# Patient Record
Sex: FEMALE | Race: WHITE | NOT HISPANIC OR LATINO | Employment: UNEMPLOYED | URBAN - METROPOLITAN AREA
[De-identification: names, ages, dates, MRNs, and addresses within clinical notes are randomized per-mention and may not be internally consistent; named-entity substitution may affect disease eponyms.]

---

## 2017-04-17 ENCOUNTER — OFFICE VISIT (OUTPATIENT)
Dept: ENDOCRINOLOGY | Age: 62
End: 2017-04-17

## 2017-04-17 VITALS
HEART RATE: 104 BPM | WEIGHT: 140.8 LBS | BODY MASS INDEX: 20.86 KG/M2 | DIASTOLIC BLOOD PRESSURE: 76 MMHG | OXYGEN SATURATION: 93 % | SYSTOLIC BLOOD PRESSURE: 140 MMHG | HEIGHT: 69 IN

## 2017-04-17 DIAGNOSIS — Z46.81 INSULIN PUMP TITRATION: ICD-10-CM

## 2017-04-17 DIAGNOSIS — IMO0002 UNCONTROLLED TYPE 1 DIABETES WITH DIABETIC NEUROPATHY: ICD-10-CM

## 2017-04-17 DIAGNOSIS — E16.1 HYPERINSULINISM: ICD-10-CM

## 2017-04-17 DIAGNOSIS — M81.0 OSTEOPOROSIS, POSTMENOPAUSAL: ICD-10-CM

## 2017-04-17 DIAGNOSIS — IMO0002 DIABETES MELLITUS TYPE 1, UNCONTROLLED, WITH COMPLICATIONS: Primary | ICD-10-CM

## 2017-04-17 DIAGNOSIS — IMO0002 UNCONTROLLED TYPE 1 DIABETES MELLITUS WITH NEPHROPATHY: ICD-10-CM

## 2017-04-17 PROCEDURE — 99214 OFFICE O/P EST MOD 30 MIN: CPT | Performed by: INTERNAL MEDICINE

## 2017-04-17 NOTE — PROGRESS NOTES
62 y.o.    Patient Care Team:  Aleja Lozano MD as PCP - General  Aleja Lozano MD as PCP - Family Medicine    Chief Complaint:        F/U TYPE 1 DIABETES, UNCONTROLLED.    Subjective     HPI patient is a 62-year-old white female with past history of uncontrolled type I diabetes mellitus came for follow-up  Patient currently uses Medtronic insulin pump  He uses NovoLog via the pump  Patient reported that her blood sugars are fluctuating up from  for the most part  Uncontrolled type 1 diabetes mellitus with neuropathy  Patient is significantly symptomatic and used to be on gabapentin in the past but currently she is on Effexor  Uncontrolled type 1 diabetes mellitus with nephropathy  Patient is significant microalbuminuria.  Hypoglycemia  Patient has several episodes of hypoglycemia mostly during the day        Interval History July 22, 2016    SUMMARY  Patient has had significantly uncontrolled diabetes mellitus over the past many years even with the pump. She frequently has blood sugars in the 25-30 range and at times and jumping to 300-400. Patient reports that she has no clue about this fluctuating glucoses and does not believe that her diet is responsible. As  She is very low-carb diet, has not been able to exercise due to recurrent fractures. She recently had a right hip fracture on 05/17/2013 and subsequently one week of the surgery she fell and broke hip and pelvis, was in [facility] rehabilitation. She reports that she had significant hypoglycemia at that location possibly secondary to increasing physical activity due to therapy.  She also has significant diabetic neuropathy. unchanged in the past few years. She currently takes gabapentin for this symptom. She started noticing muscle wasting in both hands and apparently a neurology appointment was made for March 2015     She denied any knowledge of diabetic retinopathy.   She has significant hypoglycemia as mentioned above mostly  during the day.  History of alcohol abuse in the recent past which patient denies currently.  Most of the time she leaves the pump on basal insulin only and has not been taking boluses via pump  patient reported that she fell and tore some ligaments in the elbow  She also had bilateral cataract surgeries within the past 6 months     She was also apparently admitted to psych unit and stayed there for 5 days and has been going to AA meetings and has been sober for several months now.  She also reported that she has been abstinent of alcohol for the past 3 months  She had a few pump issues with her she had to change pump infusion sets frequently.  She reported that she has checked a few times in the middle of the night but most of the Accu-Cheks were done during the day.  She has had a few episodes of hypoglycemia but none to severe recently.  She also started disconnecting her pump overnight to avoid any nocturnal hypoglycemia. As a result her morning blood sugars are elevated in the 250-400 range  Patient reported that her insulin pump settings were adjusted by Radha Ramirez in the past and the blood sugars are slightly better but she still continues to drop her blood sugars  She appears to be only using basal insulin at this time and is not using any bolus corrections so most likely the hypoglycemia is secondary to basal insulin rates  She has been feeling much better in general for the past few months.      Interval History      Patient reported that she had Reclast for 2 years approximately 2 years ago. She was unable to get any further treatments due to insurance issues.  Patient reported her blood sugars are fluctuating both up and down but generally better than before.  She still has symptomatic hyperglycemia and  reported that he had to give her some glucagon injections recently  Patient reported that she's currently due for a DEXA scan for osteoporosis    The following portions of the patient's history  were reviewed and updated as appropriate: allergies, current medications, past family history, past medical history, past social history, past surgical history and problem list.    Past Medical History:   Diagnosis Date   • Anxiety    • Arthritis    • Diabetes mellitus type I      Family History   Problem Relation Age of Onset   • Cancer Father    • Diabetes Father      Social History     Social History   • Marital status:      Spouse name: N/A   • Number of children: N/A   • Years of education: N/A     Occupational History   • Not on file.     Social History Main Topics   • Smoking status: Never Smoker   • Smokeless tobacco: Not on file   • Alcohol use No   • Drug use: No   • Sexual activity: Not on file     Other Topics Concern   • Not on file     Social History Narrative     Allergies   Allergen Reactions   • Contrast Dye Other (See Comments)     Terrible back pains   • Penicillins        Current Outpatient Prescriptions:   •  cyanocobalamin 100 MCG tablet, Take  by mouth., Disp: , Rfl:   •  glucose blood test strip, Janee Contour Next Test In Vitro Strip; Patient Sig: Janee Contour Next Test In Vitro Strip USE TO TEST FIVE TIMES DAILY OR AS DIRECTED; 450; 3; 12-Nov-2015; Active, Disp: , Rfl:   •  insulin aspart (NOVOLOG) 100 UNIT/ML injection, Inject daily under skin via insulin pump, Disp: 90 mL, Rfl: 0  •  traZODone (DESYREL) 50 MG tablet, Take 50 mg by mouth every night., Disp: , Rfl:   •  venlafaxine (EFFEXOR) 12.5 MG half tablet, Take  by mouth daily., Disp: , Rfl:         Review of Systems   Constitutional: Negative for chills, fatigue and fever.   Cardiovascular: Negative for chest pain and palpitations.   Gastrointestinal: Negative for abdominal pain, constipation, diarrhea, nausea and vomiting.   Endocrine: Negative for cold intolerance and heat intolerance.   All other systems reviewed and are negative.      Objective       Vitals:    04/17/17 1017   BP: 140/76   Pulse: 104   SpO2: 93%   Weight:  "140 lb 12.8 oz (63.9 kg)   Height: 69\" (175.3 cm)     Body mass index is 20.79 kg/(m^2).      Physical Exam   Constitutional: She is oriented to person, place, and time. She appears well-developed and well-nourished.   HENT:   Head: Normocephalic and atraumatic.   Eyes: EOM are normal. Pupils are equal, round, and reactive to light.   Neck: Normal range of motion. Neck supple. No thyromegaly present.   Cardiovascular: Normal rate, regular rhythm, normal heart sounds and intact distal pulses.    Pulmonary/Chest: Effort normal and breath sounds normal.   Abdominal: Soft. Bowel sounds are normal. She exhibits no distension.   Musculoskeletal: Normal range of motion. She exhibits no edema.   Neurological: She is alert and oriented to person, place, and time. She has normal reflexes.   Skin: Skin is warm and dry.   Psychiatric: She has a normal mood and affect. Her behavior is normal.   Nursing note and vitals reviewed.    Results Review:     I reviewed the patient's new clinical results.    Medical records reviewed  Summary:      Office Visit on 07/22/2016   Component Date Value Ref Range Status   • Hemoglobin A1C 07/22/2016 7.40* 4.80 - 5.60 % Final    Comment: Hemoglobin A1C Ranges:  Increased Risk for Diabetes  5.7% to 6.4%  Diabetes                     >= 6.5%  Diabetic Goal                < 7.0%     • Glucose 07/22/2016 115* 65 - 99 mg/dL Final   • BUN 07/22/2016 13  6 - 20 mg/dL Final   • Creatinine 07/22/2016 0.68  0.57 - 1.00 mg/dL Final   • eGFR Non  Am 07/22/2016 89  >60 mL/min/1.73 Final   • eGFR African Am 07/22/2016 107  >60 mL/min/1.73 Final   • BUN/Creatinine Ratio 07/22/2016 19.1  7.0 - 25.0 Final   • Sodium 07/22/2016 139  136 - 145 mmol/L Final   • Potassium 07/22/2016 4.7  3.5 - 5.2 mmol/L Final   • Chloride 07/22/2016 101  98 - 107 mmol/L Final   • Total CO2 07/22/2016 28.0  22.0 - 29.0 mmol/L Final   • Calcium 07/22/2016 8.8  8.6 - 10.5 mg/dL Final   • Total Protein 07/22/2016 6.8  6.0 - 8.5 " g/dL Final   • Albumin 07/22/2016 4.30  3.50 - 5.20 g/dL Final   • Globulin 07/22/2016 2.5  gm/dL Final   • A/G Ratio 07/22/2016 1.7  g/dL Final   • Total Bilirubin 07/22/2016 0.3  0.1 - 1.2 mg/dL Final   • Alkaline Phosphatase 07/22/2016 89  39 - 117 U/L Final   • AST (SGOT) 07/22/2016 26  1 - 32 U/L Final   • ALT (SGPT) 07/22/2016 24  1 - 33 U/L Final   • Total Cholesterol 07/22/2016 159  0 - 200 mg/dL Final   • Triglycerides 07/22/2016 55  0 - 150 mg/dL Final   • HDL Cholesterol 07/22/2016 79* 40 - 60 mg/dL Final   • VLDL Cholesterol 07/22/2016 11  5 - 40 mg/dL Final   • LDL Cholesterol  07/22/2016 69  0 - 100 mg/dL Final   • Creatinine, Urine 07/22/2016 127.8  Not Estab. mg/dL Final   • Microalbumin, Urine 07/22/2016 9.3  Not Estab. ug/mL Final   • Microalbumin/Creatinine Ratio Urine 07/22/2016 7.3  0.0 - 30.0 mg/g creat Final     Lab Results   Component Value Date    HGBA1C 7.40 (H) 07/22/2016    HGBA1C 7.3 (H) 03/22/2016    HGBA1C 7.5 (H) 11/06/2015     Lab Results   Component Value Date    MICROALBUR 9.3 07/22/2016    CREATININE 0.68 07/22/2016     Imaging Results (most recent)     None                Assessment and Plan:    Opal was seen today for diabetes.    Diagnoses and all orders for this visit:    Diabetes mellitus type 1, uncontrolled, with complications  -     Comprehensive Metabolic Panel; Future  -     Hemoglobin A1c; Future  -     Microalbumin / Creatinine Urine Ratio; Future  -     Lipid Panel; Future  -     Comprehensive Metabolic Panel  -     Hemoglobin A1c  -     Microalbumin / Creatinine Urine Ratio  -     Lipid Panel    Uncontrolled type 1 diabetes mellitus with nephropathy  -     Comprehensive Metabolic Panel; Future  -     Hemoglobin A1c; Future  -     Microalbumin / Creatinine Urine Ratio; Future  -     Lipid Panel; Future  -     Comprehensive Metabolic Panel  -     Hemoglobin A1c  -     Microalbumin / Creatinine Urine Ratio  -     Lipid Panel    Hyperinsulinism  -     Comprehensive  Metabolic Panel; Future  -     Hemoglobin A1c; Future  -     Microalbumin / Creatinine Urine Ratio; Future  -     Lipid Panel; Future  -     Comprehensive Metabolic Panel  -     Hemoglobin A1c  -     Microalbumin / Creatinine Urine Ratio  -     Lipid Panel    Uncontrolled type 1 diabetes with diabetic neuropathy  -     Comprehensive Metabolic Panel; Future  -     Hemoglobin A1c; Future  -     Microalbumin / Creatinine Urine Ratio; Future  -     Lipid Panel; Future  -     Comprehensive Metabolic Panel  -     Hemoglobin A1c  -     Microalbumin / Creatinine Urine Ratio  -     Lipid Panel    Insulin pump titration  -     Comprehensive Metabolic Panel; Future  -     Hemoglobin A1c; Future  -     Microalbumin / Creatinine Urine Ratio; Future  -     Lipid Panel; Future  -     Comprehensive Metabolic Panel  -     Hemoglobin A1c  -     Microalbumin / Creatinine Urine Ratio  -     Lipid Panel    Osteoporosis, postmenopausal  -     Comprehensive Metabolic Panel; Future  -     Hemoglobin A1c; Future  -     Microalbumin / Creatinine Urine Ratio; Future  -     Lipid Panel; Future  -     Comprehensive Metabolic Panel  -     Hemoglobin A1c  -     Microalbumin / Creatinine Urine Ratio  -     Lipid Panel    Other orders  -     insulin aspart (NOVOLOG) 100 UNIT/ML injection; Inject daily under skin via insulin pump      #1 uncontrolled type I diabetes mellitus: Patient has a rather brittle diabetes with blood sugars fluctuating from . Patient continues to be on insulin pump.   #2 diabetic neuropathy patient is extremely symptomatic with symptoms in all 4 extremities.  #3 recurrent hypoglycemia and hypoglycemia unawareness: Patient has significant hypoglycemia over the past several years and also has hypoglycemia unawareness.  #4 diabetic nephropathy as shown by elevated microalbumin levels  #5 insulin pump management  Pump download was reviewed  I advised the patient to use bolus correction if the blood sugars are greater than  150 prior to meal  I did adjust the basal rates today to improve the hypoglycemic risk-mostly overnight  I advised the patient to see the diabetes educator in the office   #6 Osteoporosis    Patient reported that she had Reclast injection approximately 3 years ago.  She could not get any more due to insurance issues  Patient has bilateral hip fractures within the past 2-3 years.  I recommended that patient should obtain new upgrade on the Medtronic pump 670 G  [employer] pump representative contacted    Patient will get lab testing done today  She'll return to follow-up in 3 months.    The total time spent for old record and lab review and face- to- face was more than 25 min of which greater than 15 min of time was spent on counseling the patient on recommended evaluation and treatment options, instructions for management/treatment and /or follow up  and importance of compliance with chosen management or treatment options    Grayson Levi MD. FACE    04/17/17      EMR Dragon / transcription disclaimer:    Much of this encounter note is an electronic transcription/ translation of spoken language to printed text.  Electronic translation of spoken language may permit erroneous, or at times, nonsensical words or phrases to be inadvertently transcribed; although I have reviewed the note for such errors, some may still exist.

## 2017-05-22 RX ORDER — IBUPROFEN 600 MG/1
TABLET ORAL
Qty: 1 KIT | Refills: 0 | Status: SHIPPED | OUTPATIENT
Start: 2017-05-22 | End: 2017-06-29 | Stop reason: SDUPTHER

## 2017-06-30 RX ORDER — IBUPROFEN 600 MG/1
TABLET ORAL
Qty: 1 KIT | Refills: 0 | Status: SHIPPED | OUTPATIENT
Start: 2017-06-30 | End: 2017-09-14 | Stop reason: SDUPTHER

## 2017-08-08 ENCOUNTER — OFFICE VISIT (OUTPATIENT)
Dept: ENDOCRINOLOGY | Age: 62
End: 2017-08-08

## 2017-08-08 VITALS
HEART RATE: 86 BPM | SYSTOLIC BLOOD PRESSURE: 168 MMHG | OXYGEN SATURATION: 98 % | DIASTOLIC BLOOD PRESSURE: 102 MMHG | BODY MASS INDEX: 21.18 KG/M2 | WEIGHT: 143 LBS | HEIGHT: 69 IN

## 2017-08-08 DIAGNOSIS — M81.0 OSTEOPOROSIS, POSTMENOPAUSAL: ICD-10-CM

## 2017-08-08 DIAGNOSIS — IMO0002 UNCONTROLLED TYPE 1 DIABETES MELLITUS WITH NEPHROPATHY: ICD-10-CM

## 2017-08-08 DIAGNOSIS — IMO0002 UNCONTROLLED TYPE 1 DIABETES WITH DIABETIC NEUROPATHY: ICD-10-CM

## 2017-08-08 DIAGNOSIS — IMO0002 DIABETES MELLITUS TYPE 1, UNCONTROLLED, WITH COMPLICATIONS: Primary | ICD-10-CM

## 2017-08-08 DIAGNOSIS — E16.1 HYPERINSULINISM: ICD-10-CM

## 2017-08-08 DIAGNOSIS — Z46.81 INSULIN PUMP TITRATION: ICD-10-CM

## 2017-08-08 LAB
ALBUMIN SERPL-MCNC: 4.4 G/DL (ref 3.5–5.2)
ALBUMIN/GLOB SERPL: 1.3 G/DL
ALP SERPL-CCNC: 109 U/L (ref 39–117)
ALT SERPL-CCNC: 18 U/L (ref 1–33)
AST SERPL-CCNC: 19 U/L (ref 1–32)
BILIRUB SERPL-MCNC: 0.4 MG/DL (ref 0.1–1.2)
BUN SERPL-MCNC: 6 MG/DL (ref 8–23)
BUN/CREAT SERPL: 8.8 (ref 7–25)
CALCIUM SERPL-MCNC: 9.3 MG/DL (ref 8.6–10.5)
CHLORIDE SERPL-SCNC: 99 MMOL/L (ref 98–107)
CO2 SERPL-SCNC: 31 MMOL/L (ref 22–29)
CREAT SERPL-MCNC: 0.68 MG/DL (ref 0.57–1)
GLOBULIN SER CALC-MCNC: 3.3 GM/DL
GLUCOSE SERPL-MCNC: 138 MG/DL (ref 65–99)
HBA1C MFR BLD: 6.79 % (ref 4.8–5.6)
POTASSIUM SERPL-SCNC: 4.5 MMOL/L (ref 3.5–5.2)
PROT SERPL-MCNC: 7.7 G/DL (ref 6–8.5)
SODIUM SERPL-SCNC: 140 MMOL/L (ref 136–145)

## 2017-08-08 PROCEDURE — 99214 OFFICE O/P EST MOD 30 MIN: CPT | Performed by: INTERNAL MEDICINE

## 2017-08-08 NOTE — PROGRESS NOTES
62 y.o.    Patient Care Team:  Aleja Lozano MD as PCP - General  Aleja Lozano MD as PCP - Family Medicine    Chief Complaint:    F/U TYPE 1 DIABETES UNCONTROLLED NO RECENT LABS  Subjective     HPI     Patient is a 62-year-old white female with a past medical history of uncontrolled type 1 diabetes mellitus with competitions came for followup.  Patient is currently using Medtronic insulin pump.  She uses NovoLog via the pump.  Patient reports his blood sugars are fluctuating from .  Uncontrolled type 1 diabetes mellitus with neuropathy.  Patient is significantly symptomatic and reports using gabapentin in the past but currently taking Effexor   Uncontrolled type I diabetic with nephropathy.  Patient is significant microalbuminuria.  Hypoglycemia.  Patient had several episodes of hypoglycemia. Throughout the day but  Osteoporosis.  Patient was on the RECLAST about 3 years ago until she fell and broke her hips and knees  The medication was discontinued. Due to questions regarding, healing.  Patient is, seeking further management of osteoporosis. At this point       and him  Interval History July 22, 2016     SUMMARY  Patient has had significantly uncontrolled diabetes mellitus over the past many years even with the pump. She frequently has blood sugars in the 25-30 range and at times and jumping to 300-400. Patient reports that she has no clue about this fluctuating glucoses and does not believe that her diet is responsible. As  She is very low-carb diet, has not been able to exercise due to recurrent fractures. She recently had a right hip fracture on 05/17/2013 and subsequently one week of the surgery she fell and broke hip and pelvis, was in [facility] rehabilitation. She reports that she had significant hypoglycemia at that location possibly secondary to increasing physical activity due to therapy.  She also has significant diabetic neuropathy. unchanged in the past few years. She  currently takes gabapentin for this symptom. She started noticing muscle wasting in both hands and apparently a neurology appointment was made for March 2015      She denied any knowledge of diabetic retinopathy.   She has significant hypoglycemia as mentioned above mostly during the day.  History of alcohol abuse in the recent past which patient denies currently.  Most of the time she leaves the pump on basal insulin only and has not been taking boluses via pump  patient reported that she fell and tore some ligaments in the elbow  She also had bilateral cataract surgeries within the past 6 months      She was also apparently admitted to psych unit and stayed there for 5 days and has been going to AA meetings and has been sober for several months now.  She also reported that she has been abstinent of alcohol for the past 3 months  She had a few pump issues with her she had to change pump infusion sets frequently.  She reported that she has checked a few times in the middle of the night but most of the Accu-Cheks were done during the day.  She has had a few episodes of hypoglycemia but none to severe recently.  She also started disconnecting her pump overnight to avoid any nocturnal hypoglycemia. As a result her morning blood sugars are elevated in the 250-400 range  Patient reported that her insulin pump settings were adjusted by Radha Ramirez in the past and the blood sugars are slightly better but she still continues to drop her blood sugars  She appears to be only using basal insulin at this time and is not using any bolus corrections so most likely the hypoglycemia is secondary to basal insulin rates  She has been feeling much better in general for the past few months.      Interval History      Patient reported that she had Reclast for 2 years approximately 2 years ago. She was unable to get any further treatments due to insurance issues.  Patient reported her blood sugars are fluctuating both up and down but  generally better than before.  She still has symptomatic hyperglycemia and  reported that he had to give her some glucagon injections recently  Patient reported that she's currently due for a DEXA scan for osteoporosis  The following portions of the patient's history were reviewed and updated as appropriate: allergies, current medications, past family history, past medical history, past social history, past surgical history and problem list.    Past Medical History:   Diagnosis Date   • Anxiety    • Arthritis    • Diabetes mellitus type I      Family History   Problem Relation Age of Onset   • Cancer Father    • Diabetes Father      Social History     Social History   • Marital status:      Spouse name: N/A   • Number of children: N/A   • Years of education: N/A     Occupational History   • Not on file.     Social History Main Topics   • Smoking status: Never Smoker   • Smokeless tobacco: Not on file   • Alcohol use No   • Drug use: No   • Sexual activity: Not on file     Other Topics Concern   • Not on file     Social History Narrative     Allergies   Allergen Reactions   • Contrast Dye Other (See Comments)     Terrible back pains   • Penicillins        Current Outpatient Prescriptions:   •  cyanocobalamin 100 MCG tablet, Take  by mouth., Disp: , Rfl:   •  GLUCAGON EMERGENCY 1 MG injection, USE AS DIRECTED, Disp: 1 kit, Rfl: 0  •  glucose blood test strip, Janee Contour Next Test In Vitro Strip; Patient Sig: Janee Contour Next Test In Vitro Strip USE TO TEST FIVE TIMES DAILY OR AS DIRECTED; 450; 3; 12-Nov-2015; Active, Disp: , Rfl:   •  insulin aspart (NOVOLOG) 100 UNIT/ML injection, Inject daily under skin via insulin pump, Disp: 90 mL, Rfl: 1  •  traZODone (DESYREL) 50 MG tablet, Take 50 mg by mouth every night., Disp: , Rfl:   •  venlafaxine (EFFEXOR) 12.5 MG half tablet, Take  by mouth daily., Disp: , Rfl:         Review of Systems   Constitutional: Negative for fatigue.   HENT: Negative for  "facial swelling.    Respiratory: Negative for shortness of breath and wheezing.    Cardiovascular: Negative for chest pain and leg swelling.   Gastrointestinal: Negative for diarrhea and vomiting.   Endocrine: Negative for polydipsia.   Genitourinary: Negative for frequency.   Musculoskeletal: Negative for arthralgias.   Skin: Negative for rash.   Neurological: Negative for dizziness and numbness.   All other systems reviewed and are negative.      Objective       Vitals:    08/08/17 0943   BP: (!) 168/102   Pulse: 86   SpO2: 98%   Weight: 143 lb (64.9 kg)   Height: 69\" (175.3 cm)     Body mass index is 21.12 kg/(m^2).      Physical Exam   Constitutional: She is oriented to person, place, and time. She appears well-developed and well-nourished.   HENT:   Head: Normocephalic and atraumatic.   Eyes: EOM are normal. Pupils are equal, round, and reactive to light.   Neck: Normal range of motion. Neck supple. No thyromegaly present.   Cardiovascular: Normal rate, regular rhythm, normal heart sounds and intact distal pulses.    Pulmonary/Chest: Effort normal and breath sounds normal.   Abdominal: Soft. Bowel sounds are normal. She exhibits no distension.   Musculoskeletal: Normal range of motion. She exhibits no edema.   Neurological: She is alert and oriented to person, place, and time. She has normal reflexes.   Skin: Skin is warm and dry.   Psychiatric: She has a normal mood and affect. Her behavior is normal.   Nursing note and vitals reviewed.    Results Review:     I reviewed the patient's new clinical results.    Medical records reviewed  Summary:      Office Visit on 07/22/2016   Component Date Value Ref Range Status   • Hemoglobin A1C 07/22/2016 7.40* 4.80 - 5.60 % Final    Comment: Hemoglobin A1C Ranges:  Increased Risk for Diabetes  5.7% to 6.4%  Diabetes                     >= 6.5%  Diabetic Goal                < 7.0%     • Glucose 07/22/2016 115* 65 - 99 mg/dL Final   • BUN 07/22/2016 13  6 - 20 mg/dL Final "   • Creatinine 07/22/2016 0.68  0.57 - 1.00 mg/dL Final   • eGFR Non  Am 07/22/2016 89  >60 mL/min/1.73 Final   • eGFR African Am 07/22/2016 107  >60 mL/min/1.73 Final   • BUN/Creatinine Ratio 07/22/2016 19.1  7.0 - 25.0 Final   • Sodium 07/22/2016 139  136 - 145 mmol/L Final   • Potassium 07/22/2016 4.7  3.5 - 5.2 mmol/L Final   • Chloride 07/22/2016 101  98 - 107 mmol/L Final   • Total CO2 07/22/2016 28.0  22.0 - 29.0 mmol/L Final   • Calcium 07/22/2016 8.8  8.6 - 10.5 mg/dL Final   • Total Protein 07/22/2016 6.8  6.0 - 8.5 g/dL Final   • Albumin 07/22/2016 4.30  3.50 - 5.20 g/dL Final   • Globulin 07/22/2016 2.5  gm/dL Final   • A/G Ratio 07/22/2016 1.7  g/dL Final   • Total Bilirubin 07/22/2016 0.3  0.1 - 1.2 mg/dL Final   • Alkaline Phosphatase 07/22/2016 89  39 - 117 U/L Final   • AST (SGOT) 07/22/2016 26  1 - 32 U/L Final   • ALT (SGPT) 07/22/2016 24  1 - 33 U/L Final   • Total Cholesterol 07/22/2016 159  0 - 200 mg/dL Final   • Triglycerides 07/22/2016 55  0 - 150 mg/dL Final   • HDL Cholesterol 07/22/2016 79* 40 - 60 mg/dL Final   • VLDL Cholesterol 07/22/2016 11  5 - 40 mg/dL Final   • LDL Cholesterol  07/22/2016 69  0 - 100 mg/dL Final   • Creatinine, Urine 07/22/2016 127.8  Not Estab. mg/dL Final   • Microalbumin, Urine 07/22/2016 9.3  Not Estab. ug/mL Final   • Microalbumin/Creatinine Ratio Urine 07/22/2016 7.3  0.0 - 30.0 mg/g creat Final     Lab Results   Component Value Date    HGBA1C 7.40 (H) 07/22/2016    HGBA1C 7.3 (H) 03/22/2016    HGBA1C 7.5 (H) 11/06/2015     Lab Results   Component Value Date    MICROALBUR 9.3 07/22/2016    CREATININE 0.68 07/22/2016     Imaging Results (most recent)     None                Assessment and Plan:    Diagnoses and all orders for this visit:    Diabetes mellitus type 1, uncontrolled, with complications  -     Comprehensive Metabolic Panel  -     Hemoglobin A1c  -     Cancel: dexa bone density axial; Future  -     Ambulatory Referral to Nutrition Services  -      Ambulatory Referral to Diabetic Education  -     dexa bone density axial; Future    Uncontrolled type 1 diabetes mellitus with nephropathy  -     Comprehensive Metabolic Panel  -     Hemoglobin A1c  -     Cancel: dexa bone density axial; Future  -     Ambulatory Referral to Nutrition Services  -     Ambulatory Referral to Diabetic Education  -     dexa bone density axial; Future    Uncontrolled type 1 diabetes with diabetic neuropathy  -     Comprehensive Metabolic Panel  -     Hemoglobin A1c  -     Cancel: dexa bone density axial; Future  -     Ambulatory Referral to Nutrition Services  -     Ambulatory Referral to Diabetic Education  -     dexa bone density axial; Future    Hyperinsulinism  -     Comprehensive Metabolic Panel  -     Hemoglobin A1c  -     Cancel: dexa bone density axial; Future  -     Ambulatory Referral to Nutrition Services  -     Ambulatory Referral to Diabetic Education  -     dexa bone density axial; Future    Osteoporosis, postmenopausal  -     Comprehensive Metabolic Panel  -     Hemoglobin A1c  -     Cancel: dexa bone density axial; Future  -     Ambulatory Referral to Nutrition Services  -     Ambulatory Referral to Diabetic Education  -     dexa bone density axial; Future    Insulin pump titration  -     Comprehensive Metabolic Panel  -     Hemoglobin A1c  -     Cancel: dexa bone density axial; Future  -     Ambulatory Referral to Nutrition Services  -     Ambulatory Referral to Diabetic Education  -     dexa bone density axial; Future        #1 uncontrolled type I diabetes mellitus: Patient has a rather brittle diabetes with blood sugars fluctuating from . Patient continues to be on insulin pump.   #2 diabetic neuropathy patient is extremely symptomatic with symptoms in all 4 extremities.  #3 recurrent hypoglycemia and hypoglycemia unawareness: Patient has significant hypoglycemia over the past several years and also has hypoglycemia unawareness.  #4 diabetic nephropathy as  "shown by elevated microalbumin levels  #5 insulin pump management  Pump download was reviewed  I advised the patient to use bolus correction if the blood sugars are greater than 150 prior to meal  I did adjust the basal rates today to improve the hypoglycemic risk-mostly overnight  I advised the patient to see the diabetes educator in the office   #6 Osteoporosis     Patient received with Reclast injection approximately 3 years ago.  Subsequently she fractured her hips and it was discontinued  Patient was treated for osteoporosis at that time    Her recent DEXA scan was from March 2016 which was showing osteopenia which is certainly an improvement over the previous years but patient is unable to take any bisphosphonates at this time  In the repeat DEXA scan suggest that she is in osteoporosis again she may have to try another medication  In the meantime I recommend that she try vitamin D and calcium    Glucometer download reviewed  Patient's blood sugars are still ranging from  and a very brittle  She certainly will benefit from Medtronic pump upgrade to 670 G  Discussed with the [employer]people    Patient will get lab testing done today  She will return to follow-up in 3-4 months.    The total time spent for old record and lab review and face- to- face was more than 25 min of which greater than 15 min of time was spent on counseling the patient on recommended evaluation and treatment options, instructions for management/treatment and /or follow up  and importance of compliance with chosen management or treatment options  Grayson Levi MD. FACE    08/08/17      EMR Dragon / transcription disclaimer:     \"Dictated utilizing Dragon dictation\".         "

## 2017-08-17 ENCOUNTER — APPOINTMENT (OUTPATIENT)
Dept: BONE DENSITY | Facility: HOSPITAL | Age: 62
End: 2017-08-17
Attending: INTERNAL MEDICINE

## 2017-08-30 ENCOUNTER — HOSPITAL ENCOUNTER (OUTPATIENT)
Dept: BONE DENSITY | Facility: HOSPITAL | Age: 62
Discharge: HOME | End: 2017-08-30
Attending: INTERNAL MEDICINE | Admitting: INTERNAL MEDICINE

## 2017-08-30 DIAGNOSIS — IMO0002 UNCONTROLLED TYPE 1 DIABETES WITH DIABETIC NEUROPATHY: ICD-10-CM

## 2017-08-30 DIAGNOSIS — Z46.81 INSULIN PUMP TITRATION: ICD-10-CM

## 2017-08-30 DIAGNOSIS — E16.1 HYPERINSULINISM: ICD-10-CM

## 2017-08-30 DIAGNOSIS — M81.0 OSTEOPOROSIS, POSTMENOPAUSAL: ICD-10-CM

## 2017-08-30 DIAGNOSIS — IMO0002 DIABETES MELLITUS TYPE 1, UNCONTROLLED, WITH COMPLICATIONS: ICD-10-CM

## 2017-08-30 DIAGNOSIS — IMO0002 UNCONTROLLED TYPE 1 DIABETES MELLITUS WITH NEPHROPATHY: ICD-10-CM

## 2017-08-30 PROCEDURE — 77080 DXA BONE DENSITY AXIAL: CPT

## 2017-09-11 ENCOUNTER — APPOINTMENT (OUTPATIENT)
Dept: DIABETES SERVICES | Facility: HOSPITAL | Age: 62
End: 2017-09-11
Attending: INTERNAL MEDICINE

## 2017-09-15 RX ORDER — IBUPROFEN 600 MG/1
TABLET ORAL
Qty: 1 KIT | Refills: 0 | Status: SHIPPED | OUTPATIENT
Start: 2017-09-15 | End: 2017-10-08 | Stop reason: SDUPTHER

## 2017-09-29 ENCOUNTER — OFFICE VISIT (OUTPATIENT)
Dept: ENDOCRINOLOGY | Age: 62
End: 2017-09-29

## 2017-09-29 VITALS
HEART RATE: 89 BPM | OXYGEN SATURATION: 97 % | HEIGHT: 70 IN | BODY MASS INDEX: 20.67 KG/M2 | DIASTOLIC BLOOD PRESSURE: 64 MMHG | SYSTOLIC BLOOD PRESSURE: 100 MMHG | WEIGHT: 144.4 LBS

## 2017-09-29 DIAGNOSIS — IMO0002 UNCONTROLLED TYPE 1 DIABETES MELLITUS WITH NEPHROPATHY: ICD-10-CM

## 2017-09-29 DIAGNOSIS — Z46.81 INSULIN PUMP TITRATION: ICD-10-CM

## 2017-09-29 DIAGNOSIS — M81.0 OSTEOPOROSIS, POSTMENOPAUSAL: ICD-10-CM

## 2017-09-29 DIAGNOSIS — IMO0002 UNCONTROLLED TYPE 1 DIABETES WITH DIABETIC NEUROPATHY: ICD-10-CM

## 2017-09-29 DIAGNOSIS — E16.1 HYPERINSULINISM: ICD-10-CM

## 2017-09-29 DIAGNOSIS — IMO0002 DIABETES MELLITUS TYPE 1, UNCONTROLLED, WITH COMPLICATIONS: Primary | ICD-10-CM

## 2017-09-29 PROCEDURE — 99214 OFFICE O/P EST MOD 30 MIN: CPT | Performed by: INTERNAL MEDICINE

## 2017-09-29 RX ORDER — NALTREXONE HYDROCHLORIDE 50 MG/1
TABLET, FILM COATED ORAL
Refills: 2 | COMMUNITY
Start: 2017-07-03 | End: 2017-11-30

## 2017-10-09 ENCOUNTER — APPOINTMENT (OUTPATIENT)
Dept: DIABETES SERVICES | Facility: HOSPITAL | Age: 62
End: 2017-10-09
Attending: INTERNAL MEDICINE

## 2017-10-09 RX ORDER — IBUPROFEN 600 MG/1
TABLET ORAL
Qty: 1 KIT | Refills: 0 | Status: SHIPPED | OUTPATIENT
Start: 2017-10-09 | End: 2018-03-23 | Stop reason: SDUPTHER

## 2017-11-30 ENCOUNTER — OFFICE VISIT (OUTPATIENT)
Dept: ENDOCRINOLOGY | Age: 62
End: 2017-11-30

## 2017-11-30 VITALS
SYSTOLIC BLOOD PRESSURE: 146 MMHG | HEIGHT: 68 IN | WEIGHT: 138 LBS | HEART RATE: 86 BPM | BODY MASS INDEX: 20.92 KG/M2 | DIASTOLIC BLOOD PRESSURE: 82 MMHG

## 2017-11-30 DIAGNOSIS — IMO0002 DIABETES MELLITUS TYPE 1, UNCONTROLLED, WITH COMPLICATIONS: ICD-10-CM

## 2017-11-30 DIAGNOSIS — M81.0 OSTEOPOROSIS, POSTMENOPAUSAL: ICD-10-CM

## 2017-11-30 DIAGNOSIS — E16.1 HYPERINSULINISM: ICD-10-CM

## 2017-11-30 DIAGNOSIS — Z46.81 INSULIN PUMP TITRATION: ICD-10-CM

## 2017-11-30 DIAGNOSIS — IMO0002 UNCONTROLLED TYPE 1 DIABETES MELLITUS WITH NEPHROPATHY: ICD-10-CM

## 2017-11-30 DIAGNOSIS — IMO0002 UNCONTROLLED TYPE 1 DIABETES WITH DIABETIC NEUROPATHY: Primary | ICD-10-CM

## 2017-11-30 PROCEDURE — 99214 OFFICE O/P EST MOD 30 MIN: CPT | Performed by: INTERNAL MEDICINE

## 2017-12-01 LAB
ALBUMIN SERPL-MCNC: 4.5 G/DL (ref 3.5–5.2)
ALBUMIN/CREAT UR: 9.7 MG/G CREAT (ref 0–30)
ALBUMIN/GLOB SERPL: 1.4 G/DL
ALP SERPL-CCNC: 170 U/L (ref 39–117)
ALT SERPL-CCNC: 23 U/L (ref 1–33)
AST SERPL-CCNC: 25 U/L (ref 1–32)
BILIRUB SERPL-MCNC: 0.5 MG/DL (ref 0.1–1.2)
BUN SERPL-MCNC: 9 MG/DL (ref 8–23)
BUN/CREAT SERPL: 13.2 (ref 7–25)
CALCIUM SERPL-MCNC: 9.9 MG/DL (ref 8.6–10.5)
CHLORIDE SERPL-SCNC: 96 MMOL/L (ref 98–107)
CHOLEST SERPL-MCNC: 154 MG/DL (ref 0–200)
CO2 SERPL-SCNC: 29.7 MMOL/L (ref 22–29)
CREAT SERPL-MCNC: 0.68 MG/DL (ref 0.57–1)
CREAT UR-MCNC: 145.9 MG/DL
GFR SERPLBLD CREATININE-BSD FMLA CKD-EPI: 107 ML/MIN/1.73
GFR SERPLBLD CREATININE-BSD FMLA CKD-EPI: 88 ML/MIN/1.73
GLOBULIN SER CALC-MCNC: 3.3 GM/DL
GLUCOSE SERPL-MCNC: 217 MG/DL (ref 65–99)
HBA1C MFR BLD: 7.21 % (ref 4.8–5.6)
HDLC SERPL-MCNC: 63 MG/DL (ref 40–60)
INTERPRETATION: NORMAL
LDLC SERPL CALC-MCNC: 76 MG/DL (ref 0–100)
MICROALBUMIN UR-MCNC: 14.2 UG/ML
POTASSIUM SERPL-SCNC: 4.7 MMOL/L (ref 3.5–5.2)
PROT SERPL-MCNC: 7.8 G/DL (ref 6–8.5)
SODIUM SERPL-SCNC: 140 MMOL/L (ref 136–145)
TRIGL SERPL-MCNC: 73 MG/DL (ref 0–150)
TSH SERPL DL<=0.005 MIU/L-ACNC: 1.44 MIU/ML (ref 0.27–4.2)
VLDLC SERPL CALC-MCNC: 14.6 MG/DL (ref 5–40)

## 2018-01-16 ENCOUNTER — TRANSCRIBE ORDERS (OUTPATIENT)
Dept: ADMINISTRATIVE | Facility: HOSPITAL | Age: 63
End: 2018-01-16

## 2018-01-16 DIAGNOSIS — M81.0 OSTEOPOROSIS, POST-MENOPAUSAL: Primary | ICD-10-CM

## 2018-01-23 ENCOUNTER — PRIOR AUTHORIZATION (OUTPATIENT)
Dept: ENDOCRINOLOGY | Age: 63
End: 2018-01-23

## 2018-01-24 ENCOUNTER — HOSPITAL ENCOUNTER (OUTPATIENT)
Dept: INFUSION THERAPY | Facility: HOSPITAL | Age: 63
Discharge: HOME | End: 2018-01-24
Admitting: FAMILY MEDICINE

## 2018-01-24 VITALS
DIASTOLIC BLOOD PRESSURE: 78 MMHG | OXYGEN SATURATION: 99 % | BODY MASS INDEX: 20.68 KG/M2 | HEART RATE: 80 BPM | RESPIRATION RATE: 16 BRPM | SYSTOLIC BLOOD PRESSURE: 140 MMHG | TEMPERATURE: 97.6 F | WEIGHT: 136 LBS

## 2018-01-24 DIAGNOSIS — M81.0 SENILE OSTEOPOROSIS: ICD-10-CM

## 2018-01-24 PROCEDURE — 25010000002 ZOLEDRONIC ACID 5 MG/100ML SOLUTION: Performed by: INTERNAL MEDICINE

## 2018-01-24 PROCEDURE — 96365 THER/PROPH/DIAG IV INF INIT: CPT

## 2018-01-24 RX ORDER — ZOLEDRONIC ACID 5 MG/100ML
5 INJECTION, SOLUTION INTRAVENOUS ONCE
Status: CANCELLED | OUTPATIENT
Start: 2018-01-24

## 2018-01-24 RX ORDER — CALCIUM CARBONATE 500(1250)
500 TABLET ORAL
COMMUNITY
End: 2019-04-09

## 2018-01-24 RX ORDER — ZOLEDRONIC ACID 5 MG/100ML
5 INJECTION, SOLUTION INTRAVENOUS ONCE
Status: COMPLETED | OUTPATIENT
Start: 2018-01-24 | End: 2018-01-24

## 2018-01-24 RX ADMIN — ZOLEDRONIC ACID 5 MG: 0.05 INJECTION, SOLUTION INTRAVENOUS at 14:59

## 2018-01-24 NOTE — PROGRESS NOTES
COCKCROFT GAULT CALC WNL FOR IV RECLAST. TOLERATED INFUSION WITHOUT DIFFICULTY.  DISCHARGED HOME AMB WITH  AFTER APPOINTMENT COMPLETED.

## 2018-01-24 NOTE — PATIENT INSTRUCTIONS
Zoledronic Acid injection (Paget's Disease, Osteoporosis)  What is this medicine?  ZOLEDRONIC ACID (DERICK agustina kaye ik AS id) lowers the amount of calcium loss from bone. It is used to treat Paget's disease and osteoporosis in women.  This medicine may be used for other purposes; ask your health care provider or pharmacist if you have questions.  COMMON BRAND NAME(S): Reclast, Zometa  What should I tell my health care provider before I take this medicine?  They need to know if you have any of these conditions:  -aspirin-sensitive asthma  -cancer, especially if you are receiving medicines used to treat cancer  -dental disease or wear dentures  -infection  -kidney disease  -low levels of calcium in the blood  -past surgery on the parathyroid gland or intestines  -receiving corticosteroids like dexamethasone or prednisone  -an unusual or allergic reaction to zoledronic acid, other medicines, foods, dyes, or preservatives  -pregnant or trying to get pregnant  -breast-feeding  How should I use this medicine?  This medicine is for infusion into a vein. It is given by a health care professional in a hospital or clinic setting.  Talk to your pediatrician regarding the use of this medicine in children. This medicine is not approved for use in children.  Overdosage: If you think you have taken too much of this medicine contact a poison control center or emergency room at once.  NOTE: This medicine is only for you. Do not share this medicine with others.  What if I miss a dose?  It is important not to miss your dose. Call your doctor or health care professional if you are unable to keep an appointment.  What may interact with this medicine?  -certain antibiotics given by injection  -NSAIDs, medicines for pain and inflammation, like ibuprofen or naproxen  -some diuretics like bumetanide, furosemide  -teriparatide  This list may not describe all possible interactions. Give your health care provider a list of all the medicines,  herbs, non-prescription drugs, or dietary supplements you use. Also tell them if you smoke, drink alcohol, or use illegal drugs. Some items may interact with your medicine.  What should I watch for while using this medicine?  Visit your doctor or health care professional for regular checkups. It may be some time before you see the benefit from this medicine. Do not stop taking your medicine unless your doctor tells you to. Your doctor may order blood tests or other tests to see how you are doing.  Women should inform their doctor if they wish to become pregnant or think they might be pregnant. There is a potential for serious side effects to an unborn child. Talk to your health care professional or pharmacist for more information.  You should make sure that you get enough calcium and vitamin D while you are taking this medicine. Discuss the foods you eat and the vitamins you take with your health care professional.  Some people who take this medicine have severe bone, joint, and/or muscle pain. This medicine may also increase your risk for jaw problems or a broken thigh bone. Tell your doctor right away if you have severe pain in your jaw, bones, joints, or muscles. Tell your doctor if you have any pain that does not go away or that gets worse.  Tell your dentist and dental surgeon that you are taking this medicine. You should not have major dental surgery while on this medicine. See your dentist to have a dental exam and fix any dental problems before starting this medicine. Take good care of your teeth while on this medicine. Make sure you see your dentist for regular follow-up appointments.  What side effects may I notice from receiving this medicine?  Side effects that you should report to your doctor or health care professional as soon as possible:  -allergic reactions like skin rash, itching or hives, swelling of the face, lips, or tongue  -anxiety, confusion, or depression  -breathing problems  -changes in  vision  -eye pain  -feeling faint or lightheaded, falls  -jaw pain, especially after dental work  -mouth sores  -muscle cramps, stiffness, or weakness  -redness, blistering, peeling or loosening of the skin, including inside the mouth  -trouble passing urine or change in the amount of urine  Side effects that usually do not require medical attention (report to your doctor or health care professional if they continue or are bothersome):  -bone, joint, or muscle pain  -constipation  -diarrhea  -fever  -hair loss  -irritation at site where injected  -loss of appetite  -nausea, vomiting  -stomach upset  -trouble sleeping  -trouble swallowing  -weak or tired  This list may not describe all possible side effects. Call your doctor for medical advice about side effects. You may report side effects to FDA at 6-939-FDA-9618.  Where should I keep my medicine?  This drug is given in a hospital or clinic and will not be stored at home.  NOTE: This sheet is a summary. It may not cover all possible information. If you have questions about this medicine, talk to your doctor, pharmacist, or health care provider.  © 2018 Elsevier/Gold Standard (2015-05-16 14:19:57)  Osteoporosis  Osteoporosis is the thinning and loss of density in the bones. Osteoporosis makes the bones more brittle, fragile, and likely to break (fracture). Over time, osteoporosis can cause the bones to become so weak that they fracture after a simple fall. The bones most likely to fracture are the bones in the hip, wrist, and spine.  What are the causes?  The exact cause is not known.  What increases the risk?  Anyone can develop osteoporosis. You may be at greater risk if you have a family history of the condition or have poor nutrition. You may also have a higher risk if you are:  · Female.  · 50 years old or older.  · A smoker.  · Not physically active.  · White or .  · Slender.  What are the signs or symptoms?  A fracture might be the first sign of the  disease, especially if it results from a fall or injury that would not usually cause a bone to break. Other signs and symptoms include:  · Low back and neck pain.  · Stooped posture.  · Height loss.  How is this diagnosed?  To make a diagnosis, your health care provider may:  · Take a medical history.  · Perform a physical exam.  · Order tests, such as:  ¨ A bone mineral density test.  ¨ A dual-energy X-ray absorptiometry test.  How is this treated?  The goal of osteoporosis treatment is to strengthen your bones to reduce your risk of a fracture. Treatment may involve:  · Making lifestyle changes, such as:  ¨ Eating a diet rich in calcium.  ¨ Doing weight-bearing and muscle-strengthening exercises.  ¨ Stopping tobacco use.  ¨ Limiting alcohol intake.  · Taking medicine to slow the process of bone loss or to increase bone density.  · Monitoring your levels of calcium and vitamin D.  Follow these instructions at home:  · Include calcium and vitamin D in your diet. Calcium is important for bone health, and vitamin D helps the body absorb calcium.  · Perform weight-bearing and muscle-strengthening exercises as directed by your health care provider.  · Do not use any tobacco products, including cigarettes, chewing tobacco, and electronic cigarettes. If you need help quitting, ask your health care provider.  · Limit your alcohol intake.  · Take medicines only as directed by your health care provider.  · Keep all follow-up visits as directed by your health care provider. This is important.  · Take precautions at home to lower your risk of falling, such as:  ¨ Keeping rooms well lit and clutter free.  ¨ Installing safety rails on stairs.  ¨ Using rubber mats in the bathroom and other areas that are often wet or slippery.  Get help right away if:  You fall or injure yourself.  This information is not intended to replace advice given to you by your health care provider. Make sure you discuss any questions you have with your  health care provider.  Document Released: 09/27/2006 Document Revised: 05/22/2017 Document Reviewed: 05/28/2015  [employer] Interactive Patient Education © 2017 [employer].

## 2018-03-15 DIAGNOSIS — IMO0002 UNCONTROLLED TYPE 1 DIABETES MELLITUS WITH NEPHROPATHY: Primary | ICD-10-CM

## 2018-03-16 ENCOUNTER — RESULTS ENCOUNTER (OUTPATIENT)
Dept: ENDOCRINOLOGY | Age: 63
End: 2018-03-16

## 2018-03-16 DIAGNOSIS — IMO0002 UNCONTROLLED TYPE 1 DIABETES MELLITUS WITH NEPHROPATHY: ICD-10-CM

## 2018-03-16 LAB
ALBUMIN SERPL-MCNC: 4.3 G/DL (ref 3.5–5.2)
ALBUMIN/CREAT UR: 4.8 MG/G CREAT (ref 0–30)
ALBUMIN/GLOB SERPL: 1.9 G/DL
ALP SERPL-CCNC: 104 U/L (ref 39–117)
ALT SERPL-CCNC: 19 U/L (ref 1–33)
AST SERPL-CCNC: 28 U/L (ref 1–32)
BILIRUB SERPL-MCNC: 0.3 MG/DL (ref 0.1–1.2)
BUN SERPL-MCNC: 9 MG/DL (ref 8–23)
BUN/CREAT SERPL: 13.2 (ref 7–25)
CALCIUM SERPL-MCNC: 8.8 MG/DL (ref 8.6–10.5)
CHLORIDE SERPL-SCNC: 98 MMOL/L (ref 98–107)
CHOLEST SERPL-MCNC: 146 MG/DL (ref 0–200)
CO2 SERPL-SCNC: 28.8 MMOL/L (ref 22–29)
CREAT SERPL-MCNC: 0.68 MG/DL (ref 0.57–1)
CREAT UR-MCNC: 106.1 MG/DL
GFR SERPLBLD CREATININE-BSD FMLA CKD-EPI: 107 ML/MIN/1.73
GFR SERPLBLD CREATININE-BSD FMLA CKD-EPI: 88 ML/MIN/1.73
GLOBULIN SER CALC-MCNC: 2.3 GM/DL
GLUCOSE SERPL-MCNC: 157 MG/DL (ref 65–99)
HBA1C MFR BLD: 7.1 % (ref 4.8–5.6)
HDLC SERPL-MCNC: 78 MG/DL (ref 40–60)
INTERPRETATION: NORMAL
LDLC SERPL CALC-MCNC: 60 MG/DL (ref 0–100)
MICROALBUMIN UR-MCNC: 5.1 UG/ML
POTASSIUM SERPL-SCNC: 4.5 MMOL/L (ref 3.5–5.2)
PROT SERPL-MCNC: 6.6 G/DL (ref 6–8.5)
SODIUM SERPL-SCNC: 139 MMOL/L (ref 136–145)
TRIGL SERPL-MCNC: 39 MG/DL (ref 0–150)
TSH SERPL DL<=0.005 MIU/L-ACNC: 2.32 MIU/ML (ref 0.27–4.2)
VLDLC SERPL CALC-MCNC: 7.8 MG/DL (ref 5–40)

## 2018-03-23 ENCOUNTER — OFFICE VISIT (OUTPATIENT)
Dept: ENDOCRINOLOGY | Age: 63
End: 2018-03-23

## 2018-03-23 VITALS
WEIGHT: 143.6 LBS | HEART RATE: 77 BPM | BODY MASS INDEX: 21.27 KG/M2 | SYSTOLIC BLOOD PRESSURE: 124 MMHG | HEIGHT: 69 IN | DIASTOLIC BLOOD PRESSURE: 72 MMHG

## 2018-03-23 DIAGNOSIS — IMO0002 DIABETES MELLITUS TYPE 1, UNCONTROLLED, WITH COMPLICATIONS: Primary | ICD-10-CM

## 2018-03-23 DIAGNOSIS — IMO0002 UNCONTROLLED TYPE 1 DIABETES MELLITUS WITH NEPHROPATHY: ICD-10-CM

## 2018-03-23 DIAGNOSIS — Z46.81 INSULIN PUMP TITRATION: ICD-10-CM

## 2018-03-23 DIAGNOSIS — M81.0 OSTEOPOROSIS, POSTMENOPAUSAL: ICD-10-CM

## 2018-03-23 DIAGNOSIS — E16.1 HYPERINSULINISM: ICD-10-CM

## 2018-03-23 DIAGNOSIS — IMO0002 UNCONTROLLED TYPE 1 DIABETES WITH DIABETIC NEUROPATHY: ICD-10-CM

## 2018-03-23 PROCEDURE — 99214 OFFICE O/P EST MOD 30 MIN: CPT | Performed by: INTERNAL MEDICINE

## 2018-03-23 RX ORDER — NALTREXONE HYDROCHLORIDE 50 MG/1
TABLET, FILM COATED ORAL DAILY
Refills: 2 | COMMUNITY
Start: 2018-02-13 | End: 2019-09-05

## 2018-03-23 RX ORDER — FLUOXETINE HYDROCHLORIDE 20 MG/1
CAPSULE ORAL
Refills: 4 | COMMUNITY
Start: 2018-02-13 | End: 2019-07-16

## 2018-03-23 RX ORDER — CHLORDIAZEPOXIDE HYDROCHLORIDE 25 MG/1
CAPSULE, GELATIN COATED ORAL
Refills: 0 | COMMUNITY
Start: 2018-03-07 | End: 2019-07-16

## 2018-03-23 NOTE — PROGRESS NOTES
62 y.o.    Patient Care Team:  Aleja Lozano MD as PCP - General  Aleja Lozano MD as PCP - Family Medicine  Eugene Rodas MD as PCP - Internal Medicine (Orthopedic Surgery)    Chief Complaint:      F/U TYPE 1 DIABETES, UNCONTROLLED.  HERE TO DISCUSS LAB RESULTS.  Subjective     HPI   Patient is a 63-year-old white female with a past history of uncontrolled type 1 diabetes mellitus with complications came for follow-up  Patient was using Medtronic insulin pump 530 G but only recently switched to 730 G1 week ago  The new pump data could not be downloaded  The current pump data reveals that her from 5:30 G  Her blood sugars are obviously fluctuating but has hypoglycemia in the mid morning region  Uncontrolled type 1 diabetes mellitus with neuropathy  Patient is significant symptomatic of tingling numbness and burning in both lower extremities and is currently on Effexor  Uncontrolled type 1 diabetes mellitus with nephropathy  Patient is significant microalbuminuria.  Postmenopausal osteoporosis  Patient reported that she used to be on Reclast about 3-4 years ago until when she fell.  The medication was discontinued during her hip fractures and she never restarted the medication again  She does take calcium and vitamin D on a regular basis.     Interval History July 22, 2016      SUMMARY  Patient has had significantly uncontrolled diabetes mellitus over the past many years even with the pump. She frequently has blood sugars in the 25-30 range and at times and jumping to 300-400. Patient reports that she has no clue about this fluctuating glucoses and does not believe that her diet is responsible. As  She is very low-carb diet, has not been able to exercise due to recurrent fractures. She recently had a right hip fracture on 05/17/2013 and subsequently one week of the surgery she fell and broke hip and pelvis, was in [facility] rehabilitation. She reports that she had significant  hypoglycemia at that location possibly secondary to increasing physical activity due to therapy.  She also has significant diabetic neuropathy. unchanged in the past few years. She currently takes gabapentin for this symptom. She started noticing muscle wasting in both hands and apparently a neurology appointment was made for March 2015      She denied any knowledge of diabetic retinopathy.   She has significant hypoglycemia as mentioned above mostly during the day.  History of alcohol abuse in the recent past which patient denies currently.  Most of the time she leaves the pump on basal insulin only and has not been taking boluses via pump  patient reported that she fell and tore some ligaments in the elbow  She also had bilateral cataract surgeries within the past 6 months      She was also apparently admitted to psych unit and stayed there for 5 days and has been going to AA meetings and has been sober for several months now.  She also reported that she has been abstinent of alcohol for the past 3 months  She had a few pump issues with her she had to change pump infusion sets frequently.  She reported that she has checked a few times in the middle of the night but most of the Accu-Cheks were done during the day.  She has had a few episodes of hypoglycemia but none to severe recently.  She also started disconnecting her pump overnight to avoid any nocturnal hypoglycemia. As a result her morning blood sugars are elevated in the 250-400 range  Patient reported that her insulin pump settings were adjusted by Radha Ramirez in the past and the blood sugars are slightly better but she still continues to drop her blood sugars  She appears to be only using basal insulin at this time and is not using any bolus corrections so most likely the hypoglycemia is secondary to basal insulin rates  She has been feeling much better in general for the past few months.      Interval History      Patient reported that she had Reclast  for 2 years approximately 2 years ago. She was unable to get any further treatments due to insurance issues.  Patient reported her blood sugars are fluctuating both up and down but generally better than before.  She still has symptomatic hyperglycemia and  reported that he had to give her some glucagon injections recently  Patient reported that she's currently due for a DEXA scan for osteoporosis  The following portions of the patient's history were reviewed and updated as appropriate: allergies, current medications, past family history, past medical history, past social history, past surgical history and problem list.    Past Medical History:   Diagnosis Date   • Anxiety    • Arthritis    • Diabetes mellitus type I      Family History   Problem Relation Age of Onset   • Cancer Father    • Diabetes Father      Social History     Social History   • Marital status:      Spouse name: N/A   • Number of children: N/A   • Years of education: N/A     Occupational History   • Not on file.     Social History Main Topics   • Smoking status: Never Smoker   • Smokeless tobacco: Never Used   • Alcohol use No   • Drug use: No   • Sexual activity: Not on file     Other Topics Concern   • Not on file     Social History Narrative   • No narrative on file     Allergies   Allergen Reactions   • Iodinated Diagnostic Agents Unknown (See Comments)     SEVERE BACK PAIN  SEVERE BACK PAIN   • Penicillins Hives   • Contrast Dye Other (See Comments)     Terrible back pains       Current Outpatient Prescriptions:   •  Calcium 500 MG tablet, Take 500 mg by mouth., Disp: , Rfl:   •  Cholecalciferol (VITAMIN D3) 5000 units capsule capsule, Take 5,000 Units by mouth Daily., Disp: , Rfl:   •  GLUCAGON EMERGENCY 1 MG injection, USE AS DIRECTED, Disp: 1 kit, Rfl: 0  •  glucose blood test strip, Janee Contour Next Test In Vitro Strip; Patient Sig: Janee Contour Next Test In Vitro Strip USE TO TEST FIVE TIMES DAILY OR AS DIRECTED; 450; 3;  "12-Nov-2015; Active, Disp: , Rfl:   •  NOVOLOG 100 UNIT/ML injection, INJECT DAILY UNDER THE SKIN VIA INSULIN PUMP, Disp: 90 mL, Rfl: 1  •  traZODone (DESYREL) 50 MG tablet, Take 50 mg by mouth every night., Disp: , Rfl:   •  venlafaxine (EFFEXOR) 12.5 MG half tablet, Take  by mouth daily., Disp: , Rfl:         Review of Systems   Constitutional: Negative for chills, fatigue and fever.   Cardiovascular: Negative for chest pain and palpitations.   Gastrointestinal: Negative for abdominal pain, constipation, diarrhea, nausea and vomiting.   Endocrine: Negative for cold intolerance and heat intolerance.   All other systems reviewed and are negative.      Objective       Vitals:    03/23/18 0900   BP: 124/72   Pulse: 77   Weight: 65.1 kg (143 lb 9.6 oz)   Height: 175.3 cm (69\")     Body mass index is 21.21 kg/m².      Physical Exam   Constitutional: She is oriented to person, place, and time. She appears well-developed and well-nourished.   HENT:   Head: Normocephalic and atraumatic.   Eyes: EOM are normal. Pupils are equal, round, and reactive to light.   Neck: Normal range of motion. Neck supple. No thyromegaly present.   Cardiovascular: Normal rate, regular rhythm, normal heart sounds and intact distal pulses.    Pulmonary/Chest: Effort normal and breath sounds normal.   Abdominal: Soft. Bowel sounds are normal. She exhibits no distension.   Musculoskeletal: Normal range of motion. She exhibits no edema.   Neurological: She is alert and oriented to person, place, and time. She has normal reflexes.   Skin: Skin is warm and dry.   Psychiatric: She has a normal mood and affect. Her behavior is normal.   Nursing note and vitals reviewed.    Results Review:     I reviewed the patient's new clinical results.    Medical records reviewed  Summary:      Orders Only on 03/15/2018   Component Date Value Ref Range Status   • Glucose 03/16/2018 157* 65 - 99 mg/dL Final   • BUN 03/16/2018 9  8 - 23 mg/dL Final   • Creatinine " 03/16/2018 0.68  0.57 - 1.00 mg/dL Final   • eGFR Non  Am 03/16/2018 88  >60 mL/min/1.73 Final   • eGFR African Am 03/16/2018 107  >60 mL/min/1.73 Final   • BUN/Creatinine Ratio 03/16/2018 13.2  7.0 - 25.0 Final   • Sodium 03/16/2018 139  136 - 145 mmol/L Final   • Potassium 03/16/2018 4.5  3.5 - 5.2 mmol/L Final   • Chloride 03/16/2018 98  98 - 107 mmol/L Final   • Total CO2 03/16/2018 28.8  22.0 - 29.0 mmol/L Final   • Calcium 03/16/2018 8.8  8.6 - 10.5 mg/dL Final   • Total Protein 03/16/2018 6.6  6.0 - 8.5 g/dL Final   • Albumin 03/16/2018 4.30  3.50 - 5.20 g/dL Final   • Globulin 03/16/2018 2.3  gm/dL Final   • A/G Ratio 03/16/2018 1.9  g/dL Final   • Total Bilirubin 03/16/2018 0.3  0.1 - 1.2 mg/dL Final   • Alkaline Phosphatase 03/16/2018 104  39 - 117 U/L Final   • AST (SGOT) 03/16/2018 28  1 - 32 U/L Final   • ALT (SGPT) 03/16/2018 19  1 - 33 U/L Final   • Total Cholesterol 03/16/2018 146  0 - 200 mg/dL Final   • Triglycerides 03/16/2018 39  0 - 150 mg/dL Final   • HDL Cholesterol 03/16/2018 78* 40 - 60 mg/dL Final   • VLDL Cholesterol 03/16/2018 7.8  5 - 40 mg/dL Final   • LDL Cholesterol  03/16/2018 60  0 - 100 mg/dL Final   • Creatinine, Urine 03/16/2018 106.1  Not Estab. mg/dL Final   • Microalbumin, Urine 03/16/2018 5.1  Not Estab. ug/mL Final   • Microalbumin/Creatinine Ratio 03/16/2018 4.8  0.0 - 30.0 mg/g creat Final   • Hemoglobin A1C 03/16/2018 7.10* 4.80 - 5.60 % Final    Comment: Hemoglobin A1C Ranges:  Increased Risk for Diabetes  5.7% to 6.4%  Diabetes                     >= 6.5%  Diabetic Goal                < 7.0%     • TSH 03/16/2018 2.320  0.270 - 4.200 mIU/mL Final   • Interpretation 03/16/2018 Note   Final     Lab Results   Component Value Date    HGBA1C 7.10 (H) 03/15/2018    HGBA1C 7.21 (H) 11/30/2017    HGBA1C 6.79 (H) 08/08/2017     Lab Results   Component Value Date    MICROALBUR 5.1 03/15/2018    CREATININE 0.68 03/15/2018     Imaging Results (most recent)     None     "            Assessment and Plan:    Opal was seen today for diabetes.    Diagnoses and all orders for this visit:    Diabetes mellitus type 1, uncontrolled, with complications    Uncontrolled type 1 diabetes mellitus with nephropathy    Uncontrolled type 1 diabetes with diabetic neuropathy    Hyperinsulinism    Osteoporosis, postmenopausal    Insulin pump titration    Other orders  -     glucagon (GLUCAGON EMERGENCY) 1 MG injection; Inject 1 mg into the shoulder, thigh, or buttocks See Admin Instructions.    Patient was using Medtronic MiniMed 5:30 G until recently  She just received Medtronic 670 G and started using and within the past one week  The new pump could not be downloaded  The old pump data suggest that patient blood sugars are not under control  Patient has episodes of hypoglycemia usually in the middle of the day especially midmorning    Patient had eye examination 2 weeks ago and was negative for background diabetic retinopathy    Patient reported that she may be going on medication and would like to know the backup plan  I advised her to try Levemir or Lantus approximately 14 units as basal insulin and NovoLog at mealtime carb ratio of 1 unit for every 40 carbs  Patient is given written instructions  Patient verbalized understanding    Patient will get lab testing done today  She will return to follow-up in 3-4 months    The total time spent for old record and lab review and face- to- face was more than 25 min of which greater than 15 min of time ( greater than 50% of the total time )  was spent face to face with the patient counseling and coordination of care on recommended evaluation and treatment options, instructions for management/treatment and /or follow up  and importance of compliance with chosen management or treatment options     Grayson Levi MD. FACE    03/23/18      EMR Dragon / transcription disclaimer:     \"Dictated utilizing Dragon dictation\".         "

## 2018-07-05 DIAGNOSIS — IMO0002 DIABETES MELLITUS TYPE 1, UNCONTROLLED, WITH COMPLICATIONS: Primary | ICD-10-CM

## 2018-07-09 ENCOUNTER — OFFICE VISIT (OUTPATIENT)
Dept: ENDOCRINOLOGY | Age: 63
End: 2018-07-09

## 2018-07-09 VITALS
BODY MASS INDEX: 19.4 KG/M2 | WEIGHT: 131 LBS | SYSTOLIC BLOOD PRESSURE: 110 MMHG | HEIGHT: 69 IN | DIASTOLIC BLOOD PRESSURE: 66 MMHG

## 2018-07-09 DIAGNOSIS — IMO0002 DIABETES MELLITUS TYPE 1, UNCONTROLLED, WITH COMPLICATIONS: Primary | ICD-10-CM

## 2018-07-09 DIAGNOSIS — IMO0002 UNCONTROLLED TYPE 1 DIABETES WITH DIABETIC NEUROPATHY: ICD-10-CM

## 2018-07-09 DIAGNOSIS — M81.0 OSTEOPOROSIS, POSTMENOPAUSAL: ICD-10-CM

## 2018-07-09 DIAGNOSIS — Z46.81 INSULIN PUMP TITRATION: ICD-10-CM

## 2018-07-09 DIAGNOSIS — IMO0002 UNCONTROLLED TYPE 1 DIABETES MELLITUS WITH NEPHROPATHY: ICD-10-CM

## 2018-07-09 DIAGNOSIS — E16.1 HYPERINSULINISM: ICD-10-CM

## 2018-07-09 PROCEDURE — 99214 OFFICE O/P EST MOD 30 MIN: CPT | Performed by: INTERNAL MEDICINE

## 2018-07-09 NOTE — PROGRESS NOTES
63 y.o.    Patient Care Team:  Aleja Lozano MD as PCP - General  Aleja Lozano MD as PCP - Family Medicine  Eugene Rodas MD as PCP - Internal Medicine (Orthopedic Surgery)    Chief Complaint:    F/U TYPE 1 DM, UNCONTROLLED. NO RECENT LABS  Subjective     HPI   Patient is a 63-year-old white female with a past history of uncontrolled type 1 diabetes mellitus with complications came for follow-up  Patient is currently using Medtronic insulin pump 670 G with CGM  Pump download reveals that most of her blood sugars are between   Hypoglycemia  Patient occasionally has hypoglycemia usually in the mid afternoon to late evening significantly diminished since starting the new pump  Uncontrolled type 1 diabetes mellitus with neuropathy  Patient is symptomatic of tingling numbness and burning in both lower extremities  She's currently taking Effexor XR  Uncontrolled type 1 diabetes mellitus with  Nephropathy  Patient is significant microalbuminuria  Postmenopausal osteoporosis  Patient reported that she used to be on Reclast until 3 or 4 years ago.  She apparently fell around the time and the medication was discontinued during the hip fractures she never restarted again  Patient takes vitamin D daily 5000 units capsule  She also takes calcium intermittently     Interval History July 22, 2016      SUMMARY  Patient has had significantly uncontrolled diabetes mellitus over the past many years even with the pump. She frequently has blood sugars in the 25-30 range and at times and jumping to 300-400. Patient reports that she has no clue about this fluctuating glucoses and does not believe that her diet is responsible. As  She is very low-carb diet, has not been able to exercise due to recurrent fractures. She recently had a right hip fracture on 05/17/2013 and subsequently one week of the surgery she fell and broke hip and pelvis, was in [facility] rehabilitation. She reports that she had  significant hypoglycemia at that location possibly secondary to increasing physical activity due to therapy.  She also has significant diabetic neuropathy. unchanged in the past few years. She currently takes gabapentin for this symptom. She started noticing muscle wasting in both hands and apparently a neurology appointment was made for March 2015      She denied any knowledge of diabetic retinopathy.   She has significant hypoglycemia as mentioned above mostly during the day.  History of alcohol abuse in the recent past which patient denies currently.  Most of the time she leaves the pump on basal insulin only and has not been taking boluses via pump  patient reported that she fell and tore some ligaments in the elbow  She also had bilateral cataract surgeries within the past 6 months      She was also apparently admitted to psych unit and stayed there for 5 days and has been going to AA meetings and has been sober for several months now.  She also reported that she has been abstinent of alcohol for the past 3 months  She had a few pump issues with her she had to change pump infusion sets frequently.  She reported that she has checked a few times in the middle of the night but most of the Accu-Cheks were done during the day.  She has had a few episodes of hypoglycemia but none to severe recently.  She also started disconnecting her pump overnight to avoid any nocturnal hypoglycemia. As a result her morning blood sugars are elevated in the 250-400 range  Patient reported that her insulin pump settings were adjusted by Radha Ramirez in the past and the blood sugars are slightly better but she still continues to drop her blood sugars  She appears to be only using basal insulin at this time and is not using any bolus corrections so most likely the hypoglycemia is secondary to basal insulin rates  She has been feeling much better in general for the past few months.      Interval History      Patient reported that she  had Reclast for 2 years approximately 2 years ago. She was unable to get any further treatments due to insurance issues.  Patient reported her blood sugars are fluctuating both up and down but generally better than before.  She still has symptomatic hyperglycemia and  reported that he had to give her some glucagon injections recently  Patient reported that she's currently due for a DEXA scan for osteoporosis  The following portions of the patient's history were reviewed and updated as appropriate: allergies, current medications, past family history, past medical history, past social history, past surgical history and problem list.    Past Medical History:   Diagnosis Date   • Anxiety    • Arthritis    • Diabetes mellitus type I (CMS/[facility])      Family History   Problem Relation Age of Onset   • Cancer Father    • Diabetes Father      Social History     Social History   • Marital status:      Spouse name: N/A   • Number of children: N/A   • Years of education: N/A     Occupational History   • Not on file.     Social History Main Topics   • Smoking status: Never Smoker   • Smokeless tobacco: Never Used   • Alcohol use No   • Drug use: No   • Sexual activity: Not on file     Other Topics Concern   • Not on file     Social History Narrative   • No narrative on file     Allergies   Allergen Reactions   • Iodinated Diagnostic Agents Unknown (See Comments)     SEVERE BACK PAIN  SEVERE BACK PAIN   • Penicillins Hives   • Contrast Dye Other (See Comments)     Terrible back pains       Current Outpatient Prescriptions:   •  Calcium 500 MG tablet, Take 500 mg by mouth., Disp: , Rfl:   •  chlordiazePOXIDE (LIBRIUM) 25 MG capsule, , Disp: , Rfl: 0  •  Cholecalciferol (VITAMIN D3) 5000 units capsule capsule, Take 5,000 Units by mouth Daily., Disp: , Rfl:   •  FLUoxetine (PROzac) 20 MG capsule, TK 1 C PO QD, Disp: , Rfl: 4  •  glucagon (GLUCAGON EMERGENCY) 1 MG injection, Inject 1 mg into the shoulder, thigh, or  "buttocks See Admin Instructions., Disp: 2 kit, Rfl: 1  •  glucose blood test strip, Janee Contour Next Test In Vitro Strip; Patient Sig: Janee Contour Next Test In Vitro Strip USE TO TEST FIVE TIMES DAILY OR AS DIRECTED; 450; 3; 12-Nov-2015; Active, Disp: , Rfl:   •  naltrexone (DEPADE) 50 MG tablet, Take  by mouth Daily., Disp: , Rfl: 2  •  NOVOLOG 100 UNIT/ML injection, INJECT DAILY UNDER THE SKIN VIA INSULIN PUMP, Disp: 90 mL, Rfl: 1  •  traZODone (DESYREL) 50 MG tablet, Take 50 mg by mouth every night., Disp: , Rfl:   •  venlafaxine (EFFEXOR) 12.5 MG half tablet, Take  by mouth daily., Disp: , Rfl:         Review of Systems   Constitutional: Negative for chills.   Eyes: Negative for pain.   Respiratory: Negative for shortness of breath.    Genitourinary: Negative for frequency.   Skin: Negative for pallor.   Psychiatric/Behavioral: The patient is not hyperactive.    All other systems reviewed and are negative.      Objective       Vitals:    07/09/18 1413   BP: 110/66   Weight: 59.4 kg (131 lb)   Height: 175.3 cm (69\")     Body mass index is 19.35 kg/m².      Physical Exam   Constitutional: She is oriented to person, place, and time. She appears well-developed and well-nourished.   HENT:   Head: Normocephalic and atraumatic.   Eyes: EOM are normal. Pupils are equal, round, and reactive to light.   Neck: Normal range of motion. Neck supple. No thyromegaly present.   Cardiovascular: Normal rate, regular rhythm, normal heart sounds and intact distal pulses.    Pulmonary/Chest: Effort normal and breath sounds normal.   Abdominal: Soft. Bowel sounds are normal. She exhibits no distension.   Musculoskeletal: Normal range of motion. She exhibits no edema.   Neurological: She is alert and oriented to person, place, and time. She has normal reflexes.   Skin: Skin is warm and dry.   Psychiatric: She has a normal mood and affect. Her behavior is normal.   Nursing note and vitals reviewed.    Results Review:     I reviewed " the patient's new clinical results.    Medical records reviewed  Summary:      Orders Only on 03/15/2018   Component Date Value Ref Range Status   • Glucose 03/15/2018 157* 65 - 99 mg/dL Final   • BUN 03/15/2018 9  8 - 23 mg/dL Final   • Creatinine 03/15/2018 0.68  0.57 - 1.00 mg/dL Final   • eGFR Non  Am 03/15/2018 88  >60 mL/min/1.73 Final   • eGFR African Am 03/15/2018 107  >60 mL/min/1.73 Final   • BUN/Creatinine Ratio 03/15/2018 13.2  7.0 - 25.0 Final   • Sodium 03/15/2018 139  136 - 145 mmol/L Final   • Potassium 03/15/2018 4.5  3.5 - 5.2 mmol/L Final   • Chloride 03/15/2018 98  98 - 107 mmol/L Final   • Total CO2 03/15/2018 28.8  22.0 - 29.0 mmol/L Final   • Calcium 03/15/2018 8.8  8.6 - 10.5 mg/dL Final   • Total Protein 03/15/2018 6.6  6.0 - 8.5 g/dL Final   • Albumin 03/15/2018 4.30  3.50 - 5.20 g/dL Final   • Globulin 03/15/2018 2.3  gm/dL Final   • A/G Ratio 03/15/2018 1.9  g/dL Final   • Total Bilirubin 03/15/2018 0.3  0.1 - 1.2 mg/dL Final   • Alkaline Phosphatase 03/15/2018 104  39 - 117 U/L Final   • AST (SGOT) 03/15/2018 28  1 - 32 U/L Final   • ALT (SGPT) 03/15/2018 19  1 - 33 U/L Final   • Total Cholesterol 03/15/2018 146  0 - 200 mg/dL Final   • Triglycerides 03/15/2018 39  0 - 150 mg/dL Final   • HDL Cholesterol 03/15/2018 78* 40 - 60 mg/dL Final   • VLDL Cholesterol 03/15/2018 7.8  5 - 40 mg/dL Final   • LDL Cholesterol  03/15/2018 60  0 - 100 mg/dL Final   • Creatinine, Urine 03/15/2018 106.1  Not Estab. mg/dL Final   • Microalbumin, Urine 03/15/2018 5.1  Not Estab. ug/mL Final   • Microalbumin/Creatinine Ratio 03/15/2018 4.8  0.0 - 30.0 mg/g creat Final   • Hemoglobin A1C 03/15/2018 7.10* 4.80 - 5.60 % Final    Comment: Hemoglobin A1C Ranges:  Increased Risk for Diabetes  5.7% to 6.4%  Diabetes                     >= 6.5%  Diabetic Goal                < 7.0%     • TSH 03/15/2018 2.320  0.270 - 4.200 mIU/mL Final   • Interpretation 03/15/2018 Note   Final    Supplemental report is  available.     Lab Results   Component Value Date    HGBA1C 7.10 (H) 03/15/2018    HGBA1C 7.21 (H) 11/30/2017    HGBA1C 6.79 (H) 08/08/2017     Lab Results   Component Value Date    MICROALBUR 5.1 03/15/2018    CREATININE 0.68 03/15/2018     Imaging Results (most recent)     None                Assessment and Plan:    Diagnoses and all orders for this visit:    Diabetes mellitus type 1, uncontrolled, with complications (CMS/[facility])  -     Comprehensive Metabolic Panel  -     Hemoglobin A1c  -     TSH  -     Lipid Panel  -     Microalbumin / Creatinine Urine Ratio - Urine, Clean Catch    Uncontrolled type 1 diabetes mellitus with nephropathy (CMS/[facility])    Osteoporosis, postmenopausal    Hyperinsulinism    Insulin pump titration    Uncontrolled type 1 diabetes with diabetic neuropathy (CMS/[facility])    Patient is currently using MedZapya 670 G with CGM  She had been trained adequately by the Medtronic pump team  Patient reports that most of her blood sugars are in the 100-300 range  Majority are below 200  Patient had very few episodes of hypoglycemia since starting the new pump  Patient is very excited about this    Patient did not get lab testing done recently  She will get lab testing done today    CGM download reviewed  Patient has hypoglycemia in the early afternoon and late afternoon and After dinner occasionally  Patient has hyperglycemia in the evening as well as at night  Patient has no evidence for nocturnal hypoglycemia  It appears that patient needs increased the basal rates between 10 AM to midnight  I believe she also needs to improve her carb ratios  I advised the patient to contact Medtr[employer] pump team and make Suggested changes    Patient will get lab testing done today  She will return to follow-up in 3-4 months    The total time spent  was more than 25 min of which greater than 15 min of time ( greater than 50% of the total time )  was spent face to face with the patient counseling and coordination  "of care on recommended evaluation and treatment options, instructions for management/treatment and /or follow up  and importance of compliance with chosen management or treatment options    Grayson Levi MD. FACE    07/09/18      EMR Dragon / transcription disclaimer:     \"Dictated utilizing Dragon dictation\".         "

## 2018-07-10 LAB
ALBUMIN SERPL-MCNC: 4.8 G/DL (ref 3.5–5.2)
ALBUMIN/CREAT UR: <8.7 MG/G CREAT (ref 0–30)
ALBUMIN/GLOB SERPL: 1.8 G/DL
ALP SERPL-CCNC: 116 U/L (ref 39–117)
ALT SERPL-CCNC: 27 U/L (ref 1–33)
AST SERPL-CCNC: 32 U/L (ref 1–32)
BILIRUB SERPL-MCNC: 0.2 MG/DL (ref 0.1–1.2)
BUN SERPL-MCNC: 11 MG/DL (ref 8–23)
BUN/CREAT SERPL: 14.7 (ref 7–25)
CALCIUM SERPL-MCNC: 8.7 MG/DL (ref 8.6–10.5)
CHLORIDE SERPL-SCNC: 99 MMOL/L (ref 98–107)
CHOLEST SERPL-MCNC: 197 MG/DL (ref 0–200)
CO2 SERPL-SCNC: 28 MMOL/L (ref 22–29)
CREAT SERPL-MCNC: 0.75 MG/DL (ref 0.57–1)
CREAT UR-MCNC: 34.3 MG/DL
GLOBULIN SER CALC-MCNC: 2.6 GM/DL
GLUCOSE SERPL-MCNC: 334 MG/DL (ref 65–99)
HBA1C MFR BLD: 8.1 % (ref 4.8–5.6)
HDLC SERPL-MCNC: 99 MG/DL (ref 40–60)
INTERPRETATION: NORMAL
LDLC SERPL CALC-MCNC: 79 MG/DL (ref 0–100)
Lab: NORMAL
MICROALBUMIN UR-MCNC: <3 UG/ML
POTASSIUM SERPL-SCNC: 4.6 MMOL/L (ref 3.5–5.2)
PROT SERPL-MCNC: 7.4 G/DL (ref 6–8.5)
SODIUM SERPL-SCNC: 138 MMOL/L (ref 136–145)
TRIGL SERPL-MCNC: 95 MG/DL (ref 0–150)
TSH SERPL DL<=0.005 MIU/L-ACNC: 1.48 MIU/ML (ref 0.27–4.2)
VLDLC SERPL CALC-MCNC: 19 MG/DL (ref 5–40)

## 2018-10-02 ENCOUNTER — LAB (OUTPATIENT)
Dept: ENDOCRINOLOGY | Age: 63
End: 2018-10-02

## 2018-10-02 DIAGNOSIS — IMO0002 UNCONTROLLED TYPE 1 DIABETES MELLITUS WITH NEPHROPATHY: Primary | ICD-10-CM

## 2018-10-02 DIAGNOSIS — IMO0002 UNCONTROLLED TYPE 1 DIABETES MELLITUS WITH NEPHROPATHY: ICD-10-CM

## 2018-10-03 LAB
ALBUMIN SERPL-MCNC: 3.9 G/DL (ref 3.5–5.2)
ALBUMIN/CREAT UR: <3.3 MG/G CREAT (ref 0–30)
ALBUMIN/GLOB SERPL: 1.4 G/DL
ALP SERPL-CCNC: 95 U/L (ref 39–117)
ALT SERPL-CCNC: 27 U/L (ref 1–33)
AST SERPL-CCNC: 28 U/L (ref 1–32)
BILIRUB SERPL-MCNC: 0.3 MG/DL (ref 0.1–1.2)
BUN SERPL-MCNC: 13 MG/DL (ref 8–23)
BUN/CREAT SERPL: 16.5 (ref 7–25)
CALCIUM SERPL-MCNC: 8.6 MG/DL (ref 8.6–10.5)
CHLORIDE SERPL-SCNC: 101 MMOL/L (ref 98–107)
CO2 SERPL-SCNC: 27.4 MMOL/L (ref 22–29)
CREAT SERPL-MCNC: 0.79 MG/DL (ref 0.57–1)
CREAT UR-MCNC: 90.1 MG/DL
GLOBULIN SER CALC-MCNC: 2.8 GM/DL
GLUCOSE SERPL-MCNC: 245 MG/DL (ref 65–99)
HBA1C MFR BLD: 7.5 % (ref 4.8–5.6)
MICROALBUMIN UR-MCNC: <3 UG/ML
POTASSIUM SERPL-SCNC: 3.8 MMOL/L (ref 3.5–5.2)
PROT SERPL-MCNC: 6.7 G/DL (ref 6–8.5)
SODIUM SERPL-SCNC: 138 MMOL/L (ref 136–145)
TSH SERPL DL<=0.005 MIU/L-ACNC: 1.42 MIU/ML (ref 0.27–4.2)

## 2018-10-09 ENCOUNTER — OFFICE VISIT (OUTPATIENT)
Dept: ENDOCRINOLOGY | Age: 63
End: 2018-10-09

## 2018-10-09 VITALS
HEIGHT: 69 IN | DIASTOLIC BLOOD PRESSURE: 80 MMHG | BODY MASS INDEX: 19.85 KG/M2 | HEART RATE: 68 BPM | SYSTOLIC BLOOD PRESSURE: 122 MMHG | WEIGHT: 134 LBS

## 2018-10-09 DIAGNOSIS — IMO0002 UNCONTROLLED TYPE 1 DIABETES WITH DIABETIC NEUROPATHY: ICD-10-CM

## 2018-10-09 DIAGNOSIS — IMO0002 TYPE I DIABETES MELLITUS WITH NEUROLOGICAL MANIFESTATIONS, UNCONTROLLED: ICD-10-CM

## 2018-10-09 DIAGNOSIS — M81.0 OSTEOPOROSIS, POSTMENOPAUSAL: ICD-10-CM

## 2018-10-09 DIAGNOSIS — IMO0002 DIABETES MELLITUS TYPE 1, UNCONTROLLED, WITH COMPLICATIONS: Primary | ICD-10-CM

## 2018-10-09 DIAGNOSIS — Z46.81 INSULIN PUMP TITRATION: ICD-10-CM

## 2018-10-09 DIAGNOSIS — E16.1 HYPERINSULINISM: ICD-10-CM

## 2018-10-09 PROCEDURE — 99214 OFFICE O/P EST MOD 30 MIN: CPT | Performed by: INTERNAL MEDICINE

## 2018-10-09 RX ORDER — TRAZODONE HYDROCHLORIDE 150 MG/1
TABLET ORAL
Refills: 0 | COMMUNITY
Start: 2018-09-18

## 2018-10-09 RX ORDER — HYDROCODONE BITARTRATE AND ACETAMINOPHEN 5; 325 MG/1; MG/1
1 TABLET ORAL
COMMUNITY
Start: 2018-10-06 | End: 2019-04-09

## 2018-10-09 RX ORDER — OMEPRAZOLE 40 MG/1
CAPSULE, DELAYED RELEASE ORAL
Refills: 0 | COMMUNITY
Start: 2018-09-18 | End: 2019-04-09

## 2018-10-09 NOTE — PROGRESS NOTES
63 y.o.    Patient Care Team:  Aleja Lozano MD as PCP - General  Aleja Lozano MD as PCP - Family Medicine  Eugene Rodas MD as PCP - Internal Medicine (Orthopedic Surgery)    Chief Complaint:      F/u type 1 diabetes, uncontrolled.  Here to discuss lab results.    Subjective     HPI   Patient is a 63-year-old white female with a past history of uncontrolled type 1 diabetes mellitus with complications came for follow-up    Uncontrolled type 1 diabetes mellitus  Patient is currently using Medtronic pump 670 G with CGM  Pump download reveals that most of her blood sugars are between   Majority are below 200  Hypoglycemia  Patient occasionally has hypoglycemia but she is getting more Sensor alarms regarding hypoglycemia all night long  She tries to verify with her Accu-Chek and it is usually much higher  She needs adjustment of the pump settings and sensor settings  Uncontrolled type 1 diabetes mellitus with neuropathy  Patient is currently symptomatic of tingling numbness and burning in both extremities  She is taking Effexor   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 She frequently has blood sugars in the 25-30 range and at times and jumping to 300-400. Patient reports that she has no clue about this fluctuating glucoses and does not believe that her diet is responsible. As  She is very low-carb diet, has not been able to exercise due to recurrent fractures. She recently had a right hip fracture on 05/17/2013 and  subsequently one week of the surgery she fell and broke hip and pelvis, was in [facility]. She reports that she had significant hypoglycemia at that location possibly secondary to increasing physical activity due to therapy.  She also has significant diabetic neuropathy. unchanged in the past few years. She currently takes gabapentin for this symptom. She started noticing muscle wasting in both hands and apparently a neurology appointment was made for March 2015      She denied any knowledge of diabetic retinopathy.   She has significant hypoglycemia as mentioned above mostly during the day.  History of alcohol abuse in the recent past which patient denies currently.  Most of the time she leaves the pump on basal insulin only and has not been taking boluses via pump  patient reported that she fell and tore some ligaments in the elbow  She also had bilateral cataract surgeries within the past 6 months      She was also apparently admitted to psych unit and stayed there for 5 days and has been going to AA meetings and has been sober for several months now.  She also reported that she has been abstinent of alcohol for the past 3 months  She had a few pump issues with her she had to change pump infusion sets frequently.  She reported that she has checked a few times in the middle of the night but most of the Accu-Cheks were done during the day.  She has had a few episodes of hypoglycemia but none to severe recently.  She also started disconnecting her pump overnight to avoid any nocturnal hypoglycemia. As a result her morning blood sugars are elevated in the 250-400 range  Patient reported that her insulin pump settings were adjusted by Radha Ramirez in the past and the blood sugars are slightly better but she still continues to drop her blood sugars  She appears to be only using basal insulin at this time and is not using any bolus corrections so most likely the hypoglycemia is secondary to basal insulin  rates  She has been feeling much better in general for the past few months.      Interval History      Patient reported that she had Reclast for 2 years approximately 2 years ago. She was unable to get any further treatments due to insurance issues.  Patient reported her blood sugars are fluctuating both up and down but generally better than before.  She still has symptomatic hyperglycemia and  reported that he had to give her some glucagon injections recently  Patient reported that she's currently due for a DEXA scan for osteoporosis  The following portions of the patient's history were reviewed and updated as appropriate: allergies, current medications, past family history, past medical history, past social history, past surgical history and problem list.    Past Medical History:   Diagnosis Date   • Anxiety    • Arthritis    • Diabetes mellitus type I (CMS/[facility])      Family History   Problem Relation Age of Onset   • Cancer Father    • Diabetes Father      Social History     Social History   • Marital status:      Spouse name: N/A   • Number of children: N/A   • Years of education: N/A     Occupational History   • Not on file.     Social History Main Topics   • Smoking status: Never Smoker   • Smokeless tobacco: Never Used   • Alcohol use No   • Drug use: No   • Sexual activity: Not on file     Other Topics Concern   • Not on file     Social History Narrative   • No narrative on file     Allergies   Allergen Reactions   • Iodinated Diagnostic Agents Unknown (See Comments)     SEVERE BACK PAIN  SEVERE BACK PAIN   • Penicillins Hives   • Contrast Dye Other (See Comments)     Terrible back pains       Current Outpatient Prescriptions:   •  Calcium 500 MG tablet, Take 500 mg by mouth., Disp: , Rfl:   •  chlordiazePOXIDE (LIBRIUM) 25 MG capsule, , Disp: , Rfl: 0  •  Cholecalciferol (VITAMIN D3) 5000 units capsule capsule, Take 5,000 Units by mouth Daily., Disp: , Rfl:   •  FLUoxetine (PROzac) 20 MG  "capsule, TK 1 C PO QD, Disp: , Rfl: 4  •  glucagon (GLUCAGON EMERGENCY) 1 MG injection, Inject 1 mg into the shoulder, thigh, or buttocks See Admin Instructions., Disp: 2 kit, Rfl: 1  •  glucose blood test strip, Janee Contour Next Test In Vitro Strip; Patient Sig: Janee Contour Next Test In Vitro Strip USE TO TEST FIVE TIMES DAILY OR AS DIRECTED; 450; 3; 12-Nov-2015; Active, Disp: , Rfl:   •  naltrexone (DEPADE) 50 MG tablet, Take  by mouth Daily., Disp: , Rfl: 2  •  NOVOLOG 100 UNIT/ML injection, INJECT DAILY UNDER THE SKIN VIA INSULIN PUMP, Disp: 90 mL, Rfl: 1  •  traZODone (DESYREL) 50 MG tablet, Take 50 mg by mouth every night., Disp: , Rfl:   •  venlafaxine (EFFEXOR) 12.5 MG half tablet, Take  by mouth daily., Disp: , Rfl:         Review of Systems   Constitutional: Negative for chills, fatigue and fever.   Cardiovascular: Negative for chest pain and palpitations.   Gastrointestinal: Negative for abdominal pain, constipation, diarrhea, nausea and vomiting.   Endocrine: Positive for cold intolerance. Negative for heat intolerance.   All other systems reviewed and are negative.      Objective       Vitals:    10/09/18 1048   BP: 122/80   Pulse: 68   Weight: 60.8 kg (134 lb)   Height: 175.3 cm (69\")     Body mass index is 19.79 kg/m².      Physical Exam   Constitutional: She is oriented to person, place, and time. She appears well-developed and well-nourished.   HENT:   Head: Normocephalic and atraumatic.   Eyes: Pupils are equal, round, and reactive to light. EOM are normal.   Neck: Normal range of motion. Neck supple. No thyromegaly present.   Cardiovascular: Normal rate, regular rhythm, normal heart sounds and intact distal pulses.    Pulmonary/Chest: Effort normal and breath sounds normal.   Abdominal: Soft. Bowel sounds are normal. She exhibits no distension.   Musculoskeletal: Normal range of motion. She exhibits no edema.   Neurological: She is alert and oriented to person, place, and time. She has normal " reflexes.   Skin: Skin is warm and dry.   Psychiatric: She has a normal mood and affect. Her behavior is normal.   Nursing note and vitals reviewed.    Results Review:     I reviewed the patient's new clinical results.    Medical records reviewed  Summary:      Orders Only on 10/02/2018   Component Date Value Ref Range Status   • Glucose 10/02/2018 245* 65 - 99 mg/dL Final   • BUN 10/02/2018 13  8 - 23 mg/dL Final   • Creatinine 10/02/2018 0.79  0.57 - 1.00 mg/dL Final   • eGFR Non  Am 10/02/2018 74  >60 mL/min/1.73 Final   • eGFR African Am 10/02/2018 90  >60 mL/min/1.73 Final   • BUN/Creatinine Ratio 10/02/2018 16.5  7.0 - 25.0 Final   • Sodium 10/02/2018 138  136 - 145 mmol/L Final   • Potassium 10/02/2018 3.8  3.5 - 5.2 mmol/L Final   • Chloride 10/02/2018 101  98 - 107 mmol/L Final   • Total CO2 10/02/2018 27.4  22.0 - 29.0 mmol/L Final   • Calcium 10/02/2018 8.6  8.6 - 10.5 mg/dL Final   • Total Protein 10/02/2018 6.7  6.0 - 8.5 g/dL Final   • Albumin 10/02/2018 3.90  3.50 - 5.20 g/dL Final   • Globulin 10/02/2018 2.8  gm/dL Final   • A/G Ratio 10/02/2018 1.4  g/dL Final   • Total Bilirubin 10/02/2018 0.3  0.1 - 1.2 mg/dL Final   • Alkaline Phosphatase 10/02/2018 95  39 - 117 U/L Final   • AST (SGOT) 10/02/2018 28  1 - 32 U/L Final   • ALT (SGPT) 10/02/2018 27  1 - 33 U/L Final   • Creatinine, Urine 10/02/2018 90.1  Not Estab. mg/dL Final   • Microalbumin, Urine 10/02/2018 <3.0  Not Estab. ug/mL Final   • Microalbumin/Creatinine Ratio 10/02/2018 <3.3  0.0 - 30.0 mg/g creat Final    Comment:                      Normal:                0.0 -  30.0                       Albuminuria:          31.0 - 300.0                       Clinical albuminuria:       >300.0     • Hemoglobin A1C 10/02/2018 7.50* 4.80 - 5.60 % Final    Comment: Hemoglobin A1C Ranges:  Increased Risk for Diabetes  5.7% to 6.4%  Diabetes                     >= 6.5%  Diabetic Goal                < 7.0%     • TSH 10/02/2018 1.420  0.270 -  "4.200 mIU/mL Final     Lab Results   Component Value Date    HGBA1C 7.50 (H) 10/02/2018    HGBA1C 8.10 (H) 07/09/2018    HGBA1C 7.10 (H) 03/15/2018     Lab Results   Component Value Date    MICROALBUR <3.0 10/02/2018    CREATININE 0.79 10/02/2018     Imaging Results (most recent)     None                Assessment and Plan:    Opal was seen today for diabetes.    Diagnoses and all orders for this visit:    Diabetes mellitus type 1, uncontrolled, with complications (CMS/[facility])    Type I diabetes mellitus with neurological manifestations, uncontrolled (CMS/[facility])    Uncontrolled type 1 diabetes with diabetic neuropathy (CMS/[facility])    Hyperinsulinism    Osteoporosis, postmenopausal    Insulin pump titration        Pump download reviewed  Patient's blood sugars are between   The appear better than before  CGM download reviewed  Overnight blood sugars are stable in the 100-160 range  Daytime blood sugars are fluctuating but still in the  range better than before    Patient's recent hemoglobin A1c was 7.5% better than before  I advised her to continue the current regimen  Advised the patient to see the diabetes pump educator for getting differing values on the meter versus Sensor and  Patient reports that she is waking up all night with the Sensor alarms which may need to be adjusted    Patient will return to follow-up in 3-4 months with lab tests.  The total time spent  was more than 25 min of which greater than 15 min of time ( greater than 50% of the total time )  was spent face to face with the patient counseling and coordination of care on recommended evaluation and treatment options, instructions for management/treatment and /or follow up  and importance of compliance with chosen management or treatment options  Grayson Levi MD. FACE    10/09/18      EMR Dragon / transcription disclaimer:     \"Dictated utilizing Dragon dictation\".         "

## 2019-04-09 ENCOUNTER — OFFICE VISIT (OUTPATIENT)
Dept: ENDOCRINOLOGY | Age: 64
End: 2019-04-09

## 2019-04-09 VITALS
DIASTOLIC BLOOD PRESSURE: 82 MMHG | SYSTOLIC BLOOD PRESSURE: 128 MMHG | HEART RATE: 69 BPM | HEIGHT: 69 IN | BODY MASS INDEX: 20.79 KG/M2 | WEIGHT: 140.4 LBS

## 2019-04-09 DIAGNOSIS — M81.0 OSTEOPOROSIS, POSTMENOPAUSAL: ICD-10-CM

## 2019-04-09 DIAGNOSIS — IMO0002 TYPE I DIABETES MELLITUS WITH NEUROLOGICAL MANIFESTATIONS, UNCONTROLLED: ICD-10-CM

## 2019-04-09 DIAGNOSIS — E16.1 HYPERINSULINISM: ICD-10-CM

## 2019-04-09 DIAGNOSIS — M81.0 POSTMENOPAUSAL OSTEOPOROSIS: ICD-10-CM

## 2019-04-09 DIAGNOSIS — Z46.81 INSULIN PUMP TITRATION: ICD-10-CM

## 2019-04-09 DIAGNOSIS — IMO0002 DIABETES MELLITUS TYPE 1, UNCONTROLLED, WITH COMPLICATIONS: Primary | ICD-10-CM

## 2019-04-09 PROCEDURE — 99214 OFFICE O/P EST MOD 30 MIN: CPT | Performed by: INTERNAL MEDICINE

## 2019-04-09 PROCEDURE — 95251 CONT GLUC MNTR ANALYSIS I&R: CPT | Performed by: INTERNAL MEDICINE

## 2019-04-09 RX ORDER — PANCRELIPASE 24000; 76000; 120000 [USP'U]/1; [USP'U]/1; [USP'U]/1
CAPSULE, DELAYED RELEASE PELLETS ORAL
Refills: 3 | COMMUNITY
Start: 2019-02-13

## 2019-04-09 NOTE — PROGRESS NOTES
64 y.o.    Patient Care Team:  Aleja Lozano MD as PCP - General  Aleja Lozano MD as PCP - Family Medicine  Eugene Rodas MD as PCP - Internal Medicine (Orthopedic Surgery)    Chief Complaint:      F/U TYPE 1 DM, UNCONTROLLED.   NO RECENT LABS  Subjective     HPI   Patient is a 64-year-old white female with a history of uncontrolled type 1 diabetes mellitus with complications came for follow-up    Uncontrolled type 1 diabetes mellitus  Patient is currently using Medtronic pump 670 G with CGM  Pump download reveals that majority of the blood sugars are less than 250  Hypoglycemia  Patient occasionally has hypoglycemia but it is getting much better since starting the answers she is still working with the diabetes pump educator adjusting the alarms and the settings  Uncontrolled type 1 diabetes mellitus with neuropathy  Patient is symptomatic of tingling numbness and burning in both lower extremities  She is currently taking Effexor  Uncontrolled type 1 diabetes mellitus nephropathy  Patient has significant microalbuminuria in the past  Patient denied any knowledge of diabetic retinopathy  Postmenopausal osteoporosis  Patient is currently on Reclast for the past 3-4 years  After she had the following multiple fractures a few years ago she stopped taking it  Patient reported that she does take vitamin D3 daily 5000 units      Interval History    July 22, 2016      SUMMARY  Patient has had significantly uncontrolled diabetes mellitus over the past many years even with the pump. She frequently has blood sugars in the 25-30 range and at times and jumping to 300-400. Patient reports that she has no clue about this fluctuating glucoses and does not believe that her diet is responsible. As  She is very low-carb diet, has not been able to exercise due to recurrent fractures. She recently had a right hip fracture on 05/17/2013 and subsequently one week of the surgery she fell and broke hip  and pelvis, was in [facility]. She reports that she had significant hypoglycemia at that location possibly secondary to increasing physical activity due to therapy.  She also has significant diabetic neuropathy. unchanged in the past few years. She currently takes gabapentin for this symptom. She started noticing muscle wasting in both hands and apparently a neurology appointment was made for March 2015      She denied any knowledge of diabetic retinopathy.   She has significant hypoglycemia as mentioned above mostly during the day.  History of alcohol abuse in the recent past which patient denies currently.  Most of the time she leaves the pump on basal insulin only and has not been taking boluses via pump  patient reported that she fell and tore some ligaments in the elbow  She also had bilateral cataract surgeries within the past 6 months      She was also apparently admitted to psych unit and stayed there for 5 days and has been going to AA meetings and has been sober for several months now.  She also reported that she has been abstinent of alcohol for the past 3 months  She had a few pump issues with her she had to change pump infusion sets frequently.  She reported that she has checked a few times in the middle of the night but most of the Accu-Cheks were done during the day.  She has had a few episodes of hypoglycemia but none to severe recently.  She also started disconnecting her pump overnight to avoid any nocturnal hypoglycemia. As a result her morning blood sugars are elevated in the 250-400 range  Patient reported that her insulin pump settings were adjusted by Radha Ramirez in the past and the blood sugars are slightly better but she still continues to drop her blood sugars  She appears to be only using basal insulin at this time and is not using any bolus corrections so most likely the hypoglycemia is secondary to basal insulin rates  She has been feeling much better in general for the  past few months.      Interval History      Patient reported that she had Reclast for 2 years approximately 2 years ago. She was unable to get any further treatments due to insurance issues.  Patient reported her blood sugars are fluctuating both up and down but generally better than before.  She still has symptomatic hyperglycemia and  reported that he had to give her some glucagon injections recently  Patient reported that she's currently due for a DEXA scan for osteoporosis        The following portions of the patient's history were reviewed and updated as appropriate: allergies, current medications, past family history, past medical history, past social history, past surgical history and problem list.    Past Medical History:   Diagnosis Date   • Anxiety    • Arthritis    • Diabetes mellitus type I (CMS/[facility])      Family History   Problem Relation Age of Onset   • Cancer Father    • Diabetes Father      Social History     Socioeconomic History   • Marital status:      Spouse name: Not on file   • Number of children: Not on file   • Years of education: Not on file   • Highest education level: Not on file   Tobacco Use   • Smoking status: Never Smoker   • Smokeless tobacco: Never Used   Substance and Sexual Activity   • Alcohol use: No   • Drug use: No     Allergies   Allergen Reactions   • Iodinated Diagnostic Agents Unknown (See Comments)     SEVERE BACK PAIN  SEVERE BACK PAIN  SEVERE BACK PAIN   • Penicillins Hives   • Contrast Dye Other (See Comments)     Terrible back pains       Current Outpatient Medications:   •  Calcium 500 MG tablet, Take 500 mg by mouth., Disp: , Rfl:   •  chlordiazePOXIDE (LIBRIUM) 25 MG capsule, , Disp: , Rfl: 0  •  Cholecalciferol (VITAMIN D3) 5000 units capsule capsule, Take 5,000 Units by mouth Daily., Disp: , Rfl:   •  FLUoxetine (PROzac) 20 MG capsule, TK 1 C PO QD, Disp: , Rfl: 4  •  glucagon (GLUCAGON EMERGENCY) 1 MG injection, Inject 1 mg into the shoulder, thigh,  "or buttocks See Admin Instructions., Disp: 2 kit, Rfl: 1  •  glucose blood test strip, Janee Contour Next Test In Vitro Strip; Patient Sig: Janee Contour Next Test In Vitro Strip USE TO TEST FIVE TIMES DAILY OR AS DIRECTED; 450; 3; 12-Nov-2015; Active, Disp: , Rfl:   •  HYDROcodone-acetaminophen (NORCO) 5-325 MG per tablet, Take 1 tablet by mouth., Disp: , Rfl:   •  naltrexone (DEPADE) 50 MG tablet, Take  by mouth Daily., Disp: , Rfl: 2  •  NOVOLOG 100 UNIT/ML injection, INJECT DAILY UNDER THE SKIN VIA INSULIN PUMP, Disp: 90 mL, Rfl: 1  •  omeprazole (priLOSEC) 40 MG capsule, TK 1 C PO DAILY, Disp: , Rfl: 0  •  traZODone (DESYREL) 150 MG tablet, TK 1/2 T PO QHS, Disp: , Rfl: 0  •  venlafaxine (EFFEXOR) 12.5 MG half tablet, Take  by mouth daily., Disp: , Rfl:         Review of Systems   Constitutional: Negative for chills, fatigue and fever.   Cardiovascular: Negative for chest pain and palpitations.   Gastrointestinal: Positive for abdominal pain. Negative for constipation, diarrhea, nausea and vomiting.   Endocrine: Negative for cold intolerance and heat intolerance.   All other systems reviewed and are negative.      Objective       Vitals:    04/09/19 0920   BP: 128/82   Pulse: 69   Weight: 63.7 kg (140 lb 6.4 oz)   Height: 175.3 cm (69\")     Body mass index is 20.73 kg/m².      Physical Exam   Constitutional: She is oriented to person, place, and time. She appears well-developed and well-nourished.   HENT:   Head: Normocephalic and atraumatic.   Eyes: EOM are normal. Pupils are equal, round, and reactive to light.   Neck: Normal range of motion. Neck supple. No thyromegaly present.   Cardiovascular: Normal rate, regular rhythm, normal heart sounds and intact distal pulses.   Pulmonary/Chest: Effort normal and breath sounds normal.   Abdominal: Soft. Bowel sounds are normal. She exhibits no distension.   Musculoskeletal: Normal range of motion. She exhibits no edema.   Neurological: She is alert and oriented to " person, place, and time. She has normal reflexes.   Skin: Skin is warm and dry.   Psychiatric: She has a normal mood and affect. Her behavior is normal.   Nursing note and vitals reviewed.    Results Review:     I reviewed the patient's new clinical results.    Medical records reviewed  Summary:      Orders Only on 10/02/2018   Component Date Value Ref Range Status   • Glucose 10/02/2018 245* 65 - 99 mg/dL Final   • BUN 10/02/2018 13  8 - 23 mg/dL Final   • Creatinine 10/02/2018 0.79  0.57 - 1.00 mg/dL Final   • eGFR Non  Am 10/02/2018 74  >60 mL/min/1.73 Final   • eGFR African Am 10/02/2018 90  >60 mL/min/1.73 Final   • BUN/Creatinine Ratio 10/02/2018 16.5  7.0 - 25.0 Final   • Sodium 10/02/2018 138  136 - 145 mmol/L Final   • Potassium 10/02/2018 3.8  3.5 - 5.2 mmol/L Final   • Chloride 10/02/2018 101  98 - 107 mmol/L Final   • Total CO2 10/02/2018 27.4  22.0 - 29.0 mmol/L Final   • Calcium 10/02/2018 8.6  8.6 - 10.5 mg/dL Final   • Total Protein 10/02/2018 6.7  6.0 - 8.5 g/dL Final   • Albumin 10/02/2018 3.90  3.50 - 5.20 g/dL Final   • Globulin 10/02/2018 2.8  gm/dL Final   • A/G Ratio 10/02/2018 1.4  g/dL Final   • Total Bilirubin 10/02/2018 0.3  0.1 - 1.2 mg/dL Final   • Alkaline Phosphatase 10/02/2018 95  39 - 117 U/L Final   • AST (SGOT) 10/02/2018 28  1 - 32 U/L Final   • ALT (SGPT) 10/02/2018 27  1 - 33 U/L Final   • Creatinine, Urine 10/02/2018 90.1  Not Estab. mg/dL Final   • Microalbumin, Urine 10/02/2018 <3.0  Not Estab. ug/mL Final   • Microalbumin/Creatinine Ratio 10/02/2018 <3.3  0.0 - 30.0 mg/g creat Final    Comment:                      Normal:                0.0 -  30.0                       Albuminuria:          31.0 - 300.0                       Clinical albuminuria:       >300.0     • Hemoglobin A1C 10/02/2018 7.50* 4.80 - 5.60 % Final    Comment: Hemoglobin A1C Ranges:  Increased Risk for Diabetes  5.7% to 6.4%  Diabetes                     >= 6.5%  Diabetic Goal                <  7.0%     • TSH 10/02/2018 1.420  0.270 - 4.200 mIU/mL Final     Lab Results   Component Value Date    HGBA1C 7.50 (H) 10/02/2018    HGBA1C 8.10 (H) 07/09/2018    HGBA1C 7.10 (H) 03/15/2018     Lab Results   Component Value Date    MICROALBUR <3.0 10/02/2018    CREATININE 0.79 10/02/2018     Imaging Results (most recent)     None                                    Assessment and Plan:    Opal was seen today for diabetes.    Diagnoses and all orders for this visit:    Diabetes mellitus type 1, uncontrolled, with complications (CMS/[facility])    Type I diabetes mellitus with neurological manifestations, uncontrolled (CMS/[facility])  -     Comprehensive Metabolic Panel  -     Hemoglobin A1c  -     TSH  -     Microalbumin / Creatinine Urine Ratio - Urine, Clean Catch  -     Ambulatory Referral to [facility] For Infusion Treatment    Postmenopausal osteoporosis  -     Ambulatory Referral to [facility] For Infusion Treatment    Hyperinsulinism    Insulin pump titration    Osteoporosis, postmenopausal        CGM download reviewed  Blood sugar tracing appears much better  Very minimal hypoglycemic trend  Patient does have hyperglycemia towards the end of the day  Time in target is 54%  Auto mode exits still present  Patient has not received a transmitter replacement yet  Time in auto mode  is also in the 50% range  Insulin action time 2.5 hours    Patient certainly has improved from hypoglycemia standpoint  She is still worried about the fluctuating blood sugars  I advised her to contact the diabetes pump educator today  Patient will also get lab testing done today  After the lab results are reviewed she will be notified of any further changes  Patient will return to follow-up in 3 months.      The total time spent  was more than 25 min of which greater than 15 min of time (greater than 50% of the total time)  was spent face to face with the patient counseling and coordination of care on recommended evaluation and treatment options, instructions  "for management/treatment and /or follow up and importance of compliance with chosen management or treatment options    Grayson Levi MD. FACE    04/09/19      EMR Dragon / transcription disclaimer:     \"Dictated utilizing Dragon dictation\".         "

## 2019-04-10 LAB
ALBUMIN SERPL-MCNC: 4.1 G/DL (ref 3.5–5.2)
ALBUMIN/CREAT UR: <10.2 MG/G CREAT (ref 0–30)
ALBUMIN/GLOB SERPL: 1.4 G/DL
ALP SERPL-CCNC: 82 U/L (ref 39–117)
ALT SERPL-CCNC: 29 U/L (ref 1–33)
AST SERPL-CCNC: 30 U/L (ref 1–32)
BILIRUB SERPL-MCNC: 0.3 MG/DL (ref 0.2–1.2)
BUN SERPL-MCNC: 7 MG/DL (ref 8–23)
BUN/CREAT SERPL: 10.8 (ref 7–25)
CALCIUM SERPL-MCNC: 9.4 MG/DL (ref 8.6–10.5)
CHLORIDE SERPL-SCNC: 101 MMOL/L (ref 98–107)
CO2 SERPL-SCNC: 25.9 MMOL/L (ref 22–29)
CREAT SERPL-MCNC: 0.65 MG/DL (ref 0.57–1)
CREAT UR-MCNC: 29.5 MG/DL
GLOBULIN SER CALC-MCNC: 2.9 GM/DL
GLUCOSE SERPL-MCNC: 139 MG/DL (ref 65–99)
HBA1C MFR BLD: 7 % (ref 4.8–5.6)
MICROALBUMIN UR-MCNC: <3 UG/ML
POTASSIUM SERPL-SCNC: 4.6 MMOL/L (ref 3.5–5.2)
PROT SERPL-MCNC: 7 G/DL (ref 6–8.5)
SODIUM SERPL-SCNC: 139 MMOL/L (ref 136–145)
TSH SERPL DL<=0.005 MIU/L-ACNC: 2.09 MIU/ML (ref 0.27–4.2)

## 2019-05-01 DIAGNOSIS — M81.0 OSTEOPOROSIS, UNSPECIFIED OSTEOPOROSIS TYPE, UNSPECIFIED PATHOLOGICAL FRACTURE PRESENCE: Primary | ICD-10-CM

## 2019-05-01 RX ORDER — ZOLEDRONIC ACID 5 MG/100ML
5 INJECTION, SOLUTION INTRAVENOUS ONCE
Status: SHIPPED | OUTPATIENT
Start: 2019-05-01

## 2019-05-07 RX ORDER — SODIUM CHLORIDE 9 MG/ML
250 INJECTION, SOLUTION INTRAVENOUS ONCE
Status: CANCELLED | OUTPATIENT
Start: 2019-05-07

## 2019-05-23 ENCOUNTER — LAB (OUTPATIENT)
Dept: ENDOCRINOLOGY | Age: 64
End: 2019-05-23

## 2019-05-23 DIAGNOSIS — M81.0 OSTEOPOROSIS, UNSPECIFIED OSTEOPOROSIS TYPE, UNSPECIFIED PATHOLOGICAL FRACTURE PRESENCE: Primary | ICD-10-CM

## 2019-05-23 DIAGNOSIS — M81.0 OSTEOPOROSIS, UNSPECIFIED OSTEOPOROSIS TYPE, UNSPECIFIED PATHOLOGICAL FRACTURE PRESENCE: ICD-10-CM

## 2019-05-24 LAB
ALBUMIN SERPL-MCNC: 3.9 G/DL (ref 3.5–5.2)
ALBUMIN/GLOB SERPL: 1.3 G/DL
ALP SERPL-CCNC: 127 U/L (ref 39–117)
ALT SERPL-CCNC: 18 U/L (ref 1–33)
AST SERPL-CCNC: 19 U/L (ref 1–32)
BILIRUB SERPL-MCNC: 0.4 MG/DL (ref 0.2–1.2)
BUN SERPL-MCNC: 14 MG/DL (ref 8–23)
BUN/CREAT SERPL: 21.5 (ref 7–25)
CALCIUM SERPL-MCNC: 9.7 MG/DL (ref 8.6–10.5)
CHLORIDE SERPL-SCNC: 97 MMOL/L (ref 98–107)
CO2 SERPL-SCNC: 26.5 MMOL/L (ref 22–29)
CREAT SERPL-MCNC: 0.65 MG/DL (ref 0.57–1)
GLOBULIN SER CALC-MCNC: 3.1 GM/DL
GLUCOSE SERPL-MCNC: 349 MG/DL (ref 65–99)
POTASSIUM SERPL-SCNC: 5 MMOL/L (ref 3.5–5.2)
PROT SERPL-MCNC: 7 G/DL (ref 6–8.5)
SODIUM SERPL-SCNC: 134 MMOL/L (ref 136–145)

## 2019-06-04 ENCOUNTER — HOSPITAL ENCOUNTER (OUTPATIENT)
Dept: INFUSION THERAPY | Facility: HOSPITAL | Age: 64
Discharge: HOME | End: 2019-06-04
Admitting: INTERNAL MEDICINE

## 2019-06-04 VITALS
DIASTOLIC BLOOD PRESSURE: 84 MMHG | TEMPERATURE: 98.3 F | RESPIRATION RATE: 20 BRPM | OXYGEN SATURATION: 100 % | BODY MASS INDEX: 19.2 KG/M2 | WEIGHT: 130 LBS | HEART RATE: 60 BPM | SYSTOLIC BLOOD PRESSURE: 154 MMHG

## 2019-06-04 DIAGNOSIS — M81.0 SENILE OSTEOPOROSIS: Primary | ICD-10-CM

## 2019-06-04 DIAGNOSIS — M81.0 OSTEOPOROSIS, POSTMENOPAUSAL: ICD-10-CM

## 2019-06-04 PROCEDURE — 25010000002 ZOLEDRONIC ACID 5 MG/100ML SOLUTION: Performed by: INTERNAL MEDICINE

## 2019-06-04 PROCEDURE — 96374 THER/PROPH/DIAG INJ IV PUSH: CPT

## 2019-06-04 PROCEDURE — 96365 THER/PROPH/DIAG IV INF INIT: CPT

## 2019-06-04 RX ORDER — ZOLEDRONIC ACID 5 MG/100ML
5 INJECTION, SOLUTION INTRAVENOUS ONCE
Status: COMPLETED | OUTPATIENT
Start: 2019-06-04 | End: 2019-06-04

## 2019-06-04 RX ORDER — SODIUM CHLORIDE 9 MG/ML
250 INJECTION, SOLUTION INTRAVENOUS ONCE
Status: CANCELLED | OUTPATIENT
Start: 2019-06-04

## 2019-06-04 RX ORDER — SODIUM CHLORIDE 9 MG/ML
250 INJECTION, SOLUTION INTRAVENOUS ONCE
Status: DISCONTINUED | OUTPATIENT
Start: 2019-06-04 | End: 2019-06-06 | Stop reason: HOSPADM

## 2019-06-04 RX ORDER — ZOLEDRONIC ACID 5 MG/100ML
5 INJECTION, SOLUTION INTRAVENOUS ONCE
Status: CANCELLED | OUTPATIENT
Start: 2019-06-04

## 2019-06-04 RX ADMIN — ZOLEDRONIC ACID 5 MG: 0.05 INJECTION, SOLUTION INTRAVENOUS at 09:47

## 2019-06-04 NOTE — PATIENT INSTRUCTIONS
Zoledronic Acid injection (Paget's Disease, Osteoporosis)  What is this medicine?  ZOLEDRONIC ACID (DERICK agustina kaye ik AS id) lowers the amount of calcium loss from bone. It is used to treat Paget's disease and osteoporosis in women.  This medicine may be used for other purposes; ask your health care provider or pharmacist if you have questions.  COMMON BRAND NAME(S): Reclast, Zometa  What should I tell my health care provider before I take this medicine?  They need to know if you have any of these conditions:  -aspirin-sensitive asthma  -cancer, especially if you are receiving medicines used to treat cancer  -dental disease or wear dentures  -infection  -kidney disease  -low levels of calcium in the blood  -past surgery on the parathyroid gland or intestines  -receiving corticosteroids like dexamethasone or prednisone  -an unusual or allergic reaction to zoledronic acid, other medicines, foods, dyes, or preservatives  -pregnant or trying to get pregnant  -breast-feeding  How should I use this medicine?  This medicine is for infusion into a vein. It is given by a health care professional in a hospital or clinic setting.  Talk to your pediatrician regarding the use of this medicine in children. This medicine is not approved for use in children.  Overdosage: If you think you have taken too much of this medicine contact a poison control center or emergency room at once.  NOTE: This medicine is only for you. Do not share this medicine with others.  What if I miss a dose?  It is important not to miss your dose. Call your doctor or health care professional if you are unable to keep an appointment.  What may interact with this medicine?  -certain antibiotics given by injection  -NSAIDs, medicines for pain and inflammation, like ibuprofen or naproxen  -some diuretics like bumetanide, furosemide  -teriparatide  This list may not describe all possible interactions. Give your health care provider a list of all the medicines,  herbs, non-prescription drugs, or dietary supplements you use. Also tell them if you smoke, drink alcohol, or use illegal drugs. Some items may interact with your medicine.  What should I watch for while using this medicine?  Visit your doctor or health care professional for regular checkups. It may be some time before you see the benefit from this medicine. Do not stop taking your medicine unless your doctor tells you to. Your doctor may order blood tests or other tests to see how you are doing.  Women should inform their doctor if they wish to become pregnant or think they might be pregnant. There is a potential for serious side effects to an unborn child. Talk to your health care professional or pharmacist for more information.  You should make sure that you get enough calcium and vitamin D while you are taking this medicine. Discuss the foods you eat and the vitamins you take with your health care professional.  Some people who take this medicine have severe bone, joint, and/or muscle pain. This medicine may also increase your risk for jaw problems or a broken thigh bone. Tell your doctor right away if you have severe pain in your jaw, bones, joints, or muscles. Tell your doctor if you have any pain that does not go away or that gets worse.  Tell your dentist and dental surgeon that you are taking this medicine. You should not have major dental surgery while on this medicine. See your dentist to have a dental exam and fix any dental problems before starting this medicine. Take good care of your teeth while on this medicine. Make sure you see your dentist for regular follow-up appointments.  What side effects may I notice from receiving this medicine?  Side effects that you should report to your doctor or health care professional as soon as possible:  -allergic reactions like skin rash, itching or hives, swelling of the face, lips, or tongue  -anxiety, confusion, or depression  -breathing problems  -changes in  vision  -eye pain  -feeling faint or lightheaded, falls  -jaw pain, especially after dental work  -mouth sores  -muscle cramps, stiffness, or weakness  -redness, blistering, peeling or loosening of the skin, including inside the mouth  -trouble passing urine or change in the amount of urine  Side effects that usually do not require medical attention (report to your doctor or health care professional if they continue or are bothersome):  -bone, joint, or muscle pain  -constipation  -diarrhea  -fever  -hair loss  -irritation at site where injected  -loss of appetite  -nausea, vomiting  -stomach upset  -trouble sleeping  -trouble swallowing  -weak or tired  This list may not describe all possible side effects. Call your doctor for medical advice about side effects. You may report side effects to FDA at 0-871-FDA-7300.  Where should I keep my medicine?  This drug is given in a hospital or clinic and will not be stored at home.  NOTE: This sheet is a summary. It may not cover all possible information. If you have questions about this medicine, talk to your doctor, pharmacist, or health care provider.  © 2019 [employer]/Gold Standard (2015-05-16 14:19:57)

## 2019-06-27 ENCOUNTER — TELEPHONE (OUTPATIENT)
Dept: OBSTETRICS AND GYNECOLOGY | Age: 64
End: 2019-06-27

## 2019-07-02 ENCOUNTER — LAB (OUTPATIENT)
Dept: ENDOCRINOLOGY | Age: 64
End: 2019-07-02

## 2019-07-02 DIAGNOSIS — IMO0002 TYPE I DIABETES MELLITUS WITH NEUROLOGICAL MANIFESTATIONS, UNCONTROLLED: ICD-10-CM

## 2019-07-02 DIAGNOSIS — M81.0 OSTEOPOROSIS, UNSPECIFIED OSTEOPOROSIS TYPE, UNSPECIFIED PATHOLOGICAL FRACTURE PRESENCE: ICD-10-CM

## 2019-07-02 DIAGNOSIS — IMO0002 TYPE I DIABETES MELLITUS WITH NEUROLOGICAL MANIFESTATIONS, UNCONTROLLED: Primary | ICD-10-CM

## 2019-07-03 LAB
ALBUMIN SERPL-MCNC: 4.5 G/DL (ref 3.5–5.2)
ALBUMIN/CREAT UR: <14.6 MG/G CREAT (ref 0–30)
ALBUMIN/GLOB SERPL: 1.9 G/DL
ALP SERPL-CCNC: 90 U/L (ref 39–117)
ALT SERPL-CCNC: 18 U/L (ref 1–33)
AST SERPL-CCNC: 25 U/L (ref 1–32)
BILIRUB SERPL-MCNC: 0.5 MG/DL (ref 0.2–1.2)
BUN SERPL-MCNC: 12 MG/DL (ref 8–23)
BUN/CREAT SERPL: 16.9 (ref 7–25)
CALCIUM SERPL-MCNC: 8.8 MG/DL (ref 8.6–10.5)
CHLORIDE SERPL-SCNC: 98 MMOL/L (ref 98–107)
CHOLEST SERPL-MCNC: 158 MG/DL (ref 0–200)
CO2 SERPL-SCNC: 28.5 MMOL/L (ref 22–29)
CREAT SERPL-MCNC: 0.71 MG/DL (ref 0.57–1)
CREAT UR-MCNC: 20.5 MG/DL
GLOBULIN SER CALC-MCNC: 2.4 GM/DL
GLUCOSE SERPL-MCNC: 245 MG/DL (ref 65–99)
HBA1C MFR BLD: 7.5 % (ref 4.8–5.6)
HDLC SERPL-MCNC: 87 MG/DL (ref 40–60)
INTERPRETATION: NORMAL
LDLC SERPL CALC-MCNC: 58 MG/DL (ref 0–100)
Lab: NORMAL
MICROALBUMIN UR-MCNC: <3 UG/ML
POTASSIUM SERPL-SCNC: 5.1 MMOL/L (ref 3.5–5.2)
PROT SERPL-MCNC: 6.9 G/DL (ref 6–8.5)
SODIUM SERPL-SCNC: 137 MMOL/L (ref 136–145)
TRIGL SERPL-MCNC: 65 MG/DL (ref 0–150)
TSH SERPL DL<=0.005 MIU/L-ACNC: 1.14 MIU/ML (ref 0.27–4.2)
VLDLC SERPL CALC-MCNC: 13 MG/DL

## 2019-07-16 ENCOUNTER — OFFICE VISIT (OUTPATIENT)
Dept: ENDOCRINOLOGY | Age: 64
End: 2019-07-16

## 2019-07-16 VITALS
DIASTOLIC BLOOD PRESSURE: 70 MMHG | HEIGHT: 68 IN | BODY MASS INDEX: 20.76 KG/M2 | WEIGHT: 137 LBS | HEART RATE: 85 BPM | SYSTOLIC BLOOD PRESSURE: 130 MMHG

## 2019-07-16 DIAGNOSIS — IMO0002 DIABETES MELLITUS TYPE 1, UNCONTROLLED, WITH COMPLICATIONS: Primary | ICD-10-CM

## 2019-07-16 DIAGNOSIS — G56.20 ULNAR NEUROPATHY, UNSPECIFIED LATERALITY: ICD-10-CM

## 2019-07-16 DIAGNOSIS — IMO0002 TYPE I DIABETES MELLITUS WITH NEUROLOGICAL MANIFESTATIONS, UNCONTROLLED: ICD-10-CM

## 2019-07-16 DIAGNOSIS — Z46.81 INSULIN PUMP TITRATION: ICD-10-CM

## 2019-07-16 DIAGNOSIS — E16.1 HYPERINSULINISM: ICD-10-CM

## 2019-07-16 DIAGNOSIS — G62.9 NEUROPATHY: ICD-10-CM

## 2019-07-16 PROCEDURE — 99214 OFFICE O/P EST MOD 30 MIN: CPT | Performed by: INTERNAL MEDICINE

## 2019-07-16 PROCEDURE — 95251 CONT GLUC MNTR ANALYSIS I&R: CPT | Performed by: INTERNAL MEDICINE

## 2019-07-16 NOTE — PROGRESS NOTES
64 y.o.    Patient Care Team:  Aleja Lozano MD as PCP - General  Aleja Lozano MD as PCP - Family Medicine  Eugene Rodas MD as PCP - Internal Medicine (Orthopedic Surgery)    Chief Complaint:      F/U TYPE 1 DM, UNCONTROLLED.   HERE TO DISCUSS LAB RESULTS.  Subjective     HPI   Patient is a 64-year-old female with a history of uncontrolled type 1 diabetes mellitus with complications came for follow-up    Uncontrolled type 1 diabetes mellitus  Patient is currently using Medtronic insulin pump 670 G with CGM  Reports compliance with the insulin regimen  She denies any side effects from restaurant  Also occasionally hypoglycemic  Hypoglycemia  Patient denies any recent episodes of hypoglycemia  Uncontrolled type 1 diabetes mellitus with neuropathy  Patient is symptomatic of tingling numbness and burning in both lower extremities  She is currently taking Effexor  Type 1 diabetes mellitus nephropathy  Patient has significant microalbuminuria in the past and is denying any basal retinopathy  Postmenopausal osteoporosis  Sustained a fall sometime within the past few years  Has osteopenia versus osteoporosis  Patient is advised to take vitamin D3 daily  She is currently taking before 3 times daily she has tried Reclast intravenously for the past      Interval History    July 22, 2016      SUMMARY  Patient has had significantly uncontrolled diabetes mellitus over the past many years even with the pump. She frequently has blood sugars in the 25-30 range and at times and jumping to 300-400. Patient reports that she has no clue about this fluctuating glucoses and does not believe that her diet is responsible. As  She is very low-carb diet, has not been able to exercise due to recurrent fractures. She recently had a right hip fracture on 05/17/2013 and subsequently one week of the surgery she fell and broke hip and pelvis, was in [facility] rehabilitation. She reports that she had significant  hypoglycemia at that location possibly secondary to increasing physical activity due to therapy.  She also has significant diabetic neuropathy. unchanged in the past few years. She currently takes gabapentin for this symptom. She started noticing muscle wasting in both hands and apparently a neurology appointment was made for March 2015      She denied any knowledge of diabetic retinopathy.   She has significant hypoglycemia as mentioned above mostly during the day.  History of alcohol abuse in the recent past which patient denies currently.  Most of the time she leaves the pump on basal insulin only and has not been taking boluses via pump  patient reported that she fell and tore some ligaments in the elbow  She also had bilateral cataract surgeries within the past 6 months      She was also apparently admitted to psych unit and stayed there for 5 days and has been going to AA meetings and has been sober for several months now.  She also reported that she has been abstinent of alcohol for the past 3 months  She had a few pump issues with her she had to change pump infusion sets frequently.  She reported that she has checked a few times in the middle of the night but most of the Accu-Cheks were done during the day.  She has had a few episodes of hypoglycemia but none to severe recently.  She also started disconnecting her pump overnight to avoid any nocturnal hypoglycemia. As a result her morning blood sugars are elevated in the 250-400 range  Patient reported that her insulin pump settings were adjusted by Radha Ramirez in the past and the blood sugars are slightly better but she still continues to drop her blood sugars  She appears to be only using basal insulin at this time and is not using any bolus corrections so most likely the hypoglycemia is secondary to basal insulin rates  She has been feeling much better in general for the past few months.      Interval History      Patient reported that she had Reclast  for 2 years approximately 2 years ago. She was unable to get any further treatments due to insurance issues.  Patient reported her blood sugars are fluctuating both up and down but generally better than before.  She still has symptomatic hyperglycemia and  reported that he had to give her some glucagon injections recently  Patient reported that she's currently due for a DEXA scan for osteoporosis        The following portions of the patient's history were reviewed and updated as appropriate: allergies, current medications, past family history, past medical history, past social history, past surgical history and problem list.    Past Medical History:   Diagnosis Date   • Anxiety    • Arthritis    • Diabetes mellitus type I (CMS/[facility])    • Osteoporosis      Family History   Problem Relation Age of Onset   • Cancer Father    • Diabetes Father      Social History     Socioeconomic History   • Marital status:      Spouse name: Not on file   • Number of children: Not on file   • Years of education: Not on file   • Highest education level: Not on file   Tobacco Use   • Smoking status: Never Smoker   • Smokeless tobacco: Never Used   Substance and Sexual Activity   • Alcohol use: No   • Drug use: No     Allergies   Allergen Reactions   • Iodinated Diagnostic Agents Unknown (See Comments)     SEVERE BACK PAIN  SEVERE BACK PAIN  SEVERE BACK PAIN   • Penicillins Hives   • Contrast Dye Other (See Comments)     Terrible back pains       Current Outpatient Medications:   •  chlordiazePOXIDE (LIBRIUM) 25 MG capsule, , Disp: , Rfl: 0  •  Cholecalciferol (VITAMIN D3) 5000 units capsule capsule, Take 5,000 Units by mouth Daily., Disp: , Rfl:   •  CREON 47026-39890 units capsule delayed-release particles capsule, TK 2 CS PO TID, Disp: , Rfl: 3  •  FLUoxetine (PROzac) 20 MG capsule, TK 1 C PO QD, Disp: , Rfl: 4  •  glucagon (GLUCAGON EMERGENCY) 1 MG injection, Inject 1 mg into the shoulder, thigh, or buttocks See Admin  "Instructions., Disp: 2 kit, Rfl: 1  •  glucose blood test strip, Janee Contour Next Test In Vitro Strip; Patient Sig: Janee Contour Next Test In Vitro Strip USE TO TEST FIVE TIMES DAILY OR AS DIRECTED; 450; 3; 12-Nov-2015; Active, Disp: , Rfl:   •  Insulin Infusion Pump device, Inject 1 each under the skin into the appropriate area as directed., Disp: , Rfl:   •  naltrexone (DEPADE) 50 MG tablet, Take  by mouth Daily., Disp: , Rfl: 2  •  NOVOLOG 100 UNIT/ML injection, INJECT DAILY UNDER THE SKIN VIA INSULIN PUMP, Disp: 90 mL, Rfl: 1  •  traZODone (DESYREL) 150 MG tablet, TK 1/2 T PO QHS, Disp: , Rfl: 0  •  venlafaxine (EFFEXOR) 12.5 MG half tablet, Take  by mouth daily., Disp: , Rfl:     Current Facility-Administered Medications:   •  zoledronic acid (RECLAST) infusion 5 mg, 5 mg, Intravenous, Once, Grayson Levi MD        Review of Systems   Constitutional: Negative for chills, fatigue and fever.   Cardiovascular: Negative for chest pain and palpitations.   Gastrointestinal: Negative for abdominal pain, constipation, diarrhea, nausea and vomiting.   Endocrine: Negative for cold intolerance and heat intolerance.   All other systems reviewed and are negative.      Objective       Vitals:    07/16/19 1107   BP: 130/70   Pulse: 85   Weight: 62.1 kg (137 lb)   Height: 172.7 cm (68\")     Body mass index is 20.83 kg/m².      Physical Exam   Constitutional: She is oriented to person, place, and time. She appears well-developed and well-nourished.   HENT:   Head: Normocephalic and atraumatic.   Eyes: EOM are normal. Pupils are equal, round, and reactive to light.   Neck: Normal range of motion. Neck supple. No thyromegaly present.   Cardiovascular: Normal rate, regular rhythm, normal heart sounds and intact distal pulses.   Pulmonary/Chest: Effort normal and breath sounds normal.   Abdominal: Soft. Bowel sounds are normal. She exhibits no distension.   Musculoskeletal: Normal range of motion. She exhibits no " edema.   Neurological: She is alert and oriented to person, place, and time. She has normal reflexes.   Skin: Skin is warm and dry.   Psychiatric: She has a normal mood and affect. Her behavior is normal.   Nursing note and vitals reviewed.    Results Review:     I reviewed the patient's new clinical results.    Medical records reviewed  Summary:      Lab on 07/02/2019   Component Date Value Ref Range Status   • Creatinine, Urine 07/02/2019 20.5  Not Estab. mg/dL Final   • Microalbumin, Urine 07/02/2019 <3.0  Not Estab. ug/mL Final   • Microalbumin/Creatinine Ratio 07/02/2019 <14.6  0.0 - 30.0 mg/g creat Final    Comment:                      Normal:                0.0 -  30.0                       Albuminuria:          31.0 - 300.0                       Clinical albuminuria:       >300.0     • Total Cholesterol 07/02/2019 158  0 - 200 mg/dL Final   • Triglycerides 07/02/2019 65  0 - 150 mg/dL Final   • HDL Cholesterol 07/02/2019 87* 40 - 60 mg/dL Final   • VLDL Cholesterol 07/02/2019 13  mg/dL Final   • LDL Cholesterol  07/02/2019 58  0 - 100 mg/dL Final   • TSH 07/02/2019 1.140  0.270 - 4.200 mIU/mL Final   • Hemoglobin A1C 07/02/2019 7.50* 4.80 - 5.60 % Final    Comment: Hemoglobin A1C Ranges:  Increased Risk for Diabetes  5.7% to 6.4%  Diabetes                     >= 6.5%  Diabetic Goal                < 7.0%     • Glucose 07/02/2019 245* 65 - 99 mg/dL Final   • BUN 07/02/2019 12  8 - 23 mg/dL Final   • Creatinine 07/02/2019 0.71  0.57 - 1.00 mg/dL Final   • eGFR Non  Am 07/02/2019 83  >60 mL/min/1.73 Final   • eGFR African Am 07/02/2019 101  >60 mL/min/1.73 Final   • BUN/Creatinine Ratio 07/02/2019 16.9  7.0 - 25.0 Final   • Sodium 07/02/2019 137  136 - 145 mmol/L Final   • Potassium 07/02/2019 5.1  3.5 - 5.2 mmol/L Final   • Chloride 07/02/2019 98  98 - 107 mmol/L Final   • Total CO2 07/02/2019 28.5  22.0 - 29.0 mmol/L Final   • Calcium 07/02/2019 8.8  8.6 - 10.5 mg/dL Final   • Total Protein 07/02/2019  6.9  6.0 - 8.5 g/dL Final   • Albumin 07/02/2019 4.50  3.50 - 5.20 g/dL Final   • Globulin 07/02/2019 2.4  gm/dL Final   • A/G Ratio 07/02/2019 1.9  g/dL Final   • Total Bilirubin 07/02/2019 0.5  0.2 - 1.2 mg/dL Final   • Alkaline Phosphatase 07/02/2019 90  39 - 117 U/L Final   • AST (SGOT) 07/02/2019 25  1 - 32 U/L Final   • ALT (SGPT) 07/02/2019 18  1 - 33 U/L Final   • Interpretation 07/02/2019 Note   Final    Supplemental report is available.   • PDF Image 07/02/2019 Not applicable   Final     Lab Results   Component Value Date    HGBA1C 7.50 (H) 07/02/2019    HGBA1C 7.00 (H) 04/09/2019    HGBA1C 7.50 (H) 10/02/2018     Lab Results   Component Value Date    MICROALBUR <3.0 07/02/2019    CREATININE 0.71 07/02/2019     Imaging Results (most recent)     None                                    Assessment and Plan:    Opal was seen today for diabetes.    Diagnoses and all orders for this visit:    Diabetes mellitus type 1, uncontrolled, with complications (CMS/[facility])    Hyperinsulinism    Insulin pump titration    Neuropathy    Type I diabetes mellitus with neurological manifestations, uncontrolled (CMS/[facility])    Ulnar neuropathy, unspecified laterality        Medtronic CGM download reviewed  Time in target range 74%  Overnight blood sugars appear stable  Patient clearly has hyperglycemia after lunch and dinner  Patient's hemoglobin A1c 7.5%    I think she needs to adjust the carb ratios  No evidence for hypoglycemia noted  Patient will see the diabetes pump educator today for further adjustments    Patient return to follow-up in 3 months  The total time spent  was more than 25 min of which greater than 15 min of time (greater than 50% of the total time)  was spent face to face with the patient counseling and coordination of care on recommended evaluation and treatment options, instructions for management/treatment and /or follow up and importance of compliance with chosen management or treatment options  Grayson TAM  "Gurmeet PULLIAM. FACE    07/16/19      EMR Dragon / transcription disclaimer:     \"Dictated utilizing Dragon dictation\".         "

## 2019-09-05 ENCOUNTER — OFFICE VISIT (OUTPATIENT)
Dept: OBSTETRICS AND GYNECOLOGY | Age: 64
End: 2019-09-05

## 2019-09-05 ENCOUNTER — APPOINTMENT (OUTPATIENT)
Dept: WOMENS IMAGING | Facility: HOSPITAL | Age: 64
End: 2019-09-05

## 2019-09-05 ENCOUNTER — PROCEDURE VISIT (OUTPATIENT)
Dept: OBSTETRICS AND GYNECOLOGY | Age: 64
End: 2019-09-05

## 2019-09-05 VITALS
SYSTOLIC BLOOD PRESSURE: 108 MMHG | HEIGHT: 68 IN | DIASTOLIC BLOOD PRESSURE: 74 MMHG | WEIGHT: 134 LBS | BODY MASS INDEX: 20.31 KG/M2

## 2019-09-05 DIAGNOSIS — Z12.4 ROUTINE CERVICAL SMEAR: ICD-10-CM

## 2019-09-05 DIAGNOSIS — Z01.419 ENCOUNTER FOR GYNECOLOGICAL EXAMINATION WITHOUT ABNORMAL FINDING: Primary | ICD-10-CM

## 2019-09-05 DIAGNOSIS — Z12.31 VISIT FOR SCREENING MAMMOGRAM: Primary | ICD-10-CM

## 2019-09-05 DIAGNOSIS — N95.2 VAGINAL ATROPHY: ICD-10-CM

## 2019-09-05 DIAGNOSIS — Z11.51 SPECIAL SCREENING EXAMINATION FOR HUMAN PAPILLOMAVIRUS (HPV): ICD-10-CM

## 2019-09-05 DIAGNOSIS — R92.1 BREAST CALCIFICATIONS ON MAMMOGRAM: ICD-10-CM

## 2019-09-05 PROCEDURE — 99386 PREV VISIT NEW AGE 40-64: CPT | Performed by: OBSTETRICS & GYNECOLOGY

## 2019-09-05 PROCEDURE — 77067 SCR MAMMO BI INCL CAD: CPT | Performed by: RADIOLOGY

## 2019-09-05 PROCEDURE — 77067 SCR MAMMO BI INCL CAD: CPT | Performed by: OBSTETRICS & GYNECOLOGY

## 2019-09-05 RX ORDER — VALACYCLOVIR HYDROCHLORIDE 500 MG/1
1000 TABLET, FILM COATED ORAL DAILY
Qty: 30 TABLET | Refills: 2 | Status: SHIPPED | OUTPATIENT
Start: 2019-09-05 | End: 2019-10-05

## 2019-09-05 RX ORDER — FLUOXETINE HYDROCHLORIDE 20 MG/1
1 CAPSULE ORAL DAILY
COMMUNITY
Start: 2019-07-23

## 2019-09-05 NOTE — PROGRESS NOTES
Subjective     Chief Complaint   Patient presents with   • Gynecologic Exam     AC Re-establish care, last seen        History of Present Illness    Opal Gutierrez is a 64 y.o.  who presents for annual exam.  She has not been seen here for 9 years.  She has a history of bilateral hip fractures.  Her right hip has been replaced and she has had surgery on her left.  She does request yearly for osteoporosis.    Patient is very tan and spends a lot of time in the sun.  She has not seen dermatology.    Patient is on an insulin pump that is been working well.    Her colonoscopy was just done.    She has no vaginal bleeding or pelvic pain.  Her main complaint is of vaginal dryness and inability to have intercourse.  She has not had sex for about 9 years.    Obstetric History:  OB History      Para Term  AB Living    0 0 0 0 0 0    SAB TAB Ectopic Molar Multiple Live Births    0 0 0 0 0 0         Menstrual History:     No LMP recorded. Patient is postmenopausal.         Current contraception: post menopausal status  History of abnormal Pap smear: no  Received Gardasil immunization: no  Perform regular self breast exam: yes  Family history of uterine or ovarian cancer: no  Family History of colon cancer: yes - MGM 80  Family history of breast cancer: no    Mammogram: done today.  Colonoscopy: up to date. 2019  DEXA: up to date.2017  On reclast    Exercise: exercises 2 times a week  Calcium/Vitamin D: adequate intake and uses supplements    The following portions of the patient's history were reviewed and updated as appropriate: allergies, current medications, past family history, past medical history, past social history, past surgical history and problem list.    Review of Systems    Review of Systems   Constitutional: Negative for fatigue.   Respiratory: Negative for shortness of breath.    Gastrointestinal: Negative for abdominal pain.   Genitourinary: Negative for dysuria.   Neurological:  "Negative for headaches.   Psychiatric/Behavioral: Negative for dysphoric mood.         Objective   Physical Exam    /74   Ht 172.7 cm (68\")   Wt 60.8 kg (134 lb)   BMI 20.37 kg/m²     General:   alert, appears stated age and cooperative   Neck: thyroid normal to palpation   Heart: regular rate and rhythm   Lungs: clear to auscultation bilaterally   Abdomen: soft, non-tender, without masses or organomegaly   Breast: inspection negative, no nipple discharge or bleeding, no masses or nodularity palpable   Vulva: normal, Bartholin's, Urethra, [place]'s normal   Vagina: normal mucosa, normal discharge   Cervix: no cervical motion tenderness and no lesions   Uterus: non-tender, normal shape and consistency   Adnexa: no mass, fullness, tenderness   Rectal: normal rectal, no masses     Assessment/Plan   Opal was seen today for gynecologic exam.    Diagnoses and all orders for this visit:    Encounter for gynecological examination without abnormal finding    Vaginal atrophy    Other orders  -     valACYclovir (VALTREX) 500 MG tablet; Take 2 tablets by mouth Daily for 30 days.  -     Estradiol (IMVEXXY STARTER PACK) 4 MCG insert; Insert 1 each into the vagina Daily for 14 days.  -     Estradiol (IMVEXXY MAINTENANCE PACK) 4 MCG insert; Insert 1 each into the vagina 2 (Two) Times a Week.      We discussed treatments for vaginal atrophy and dyspareunia.  Patient would like to try vaginal estrogen.  We discussed risk and benefits.  She will report any bleeding or pelvic pain.    Patient also has a history of herpes outbreaks about twice per year.  She would like to consider suppression since she will be attempting intercourse.  Her  has not had a history of outbreaks.  All questions answered.  Breast self exam technique reviewed and patient encouraged to perform self-exam monthly.  Discussed healthy lifestyle modifications.  Recommended 30 minutes of aerobic exercise five times per week.  Discussed calcium needs " to prevent osteoporosis.

## 2019-09-09 ENCOUNTER — TELEPHONE (OUTPATIENT)
Dept: OBSTETRICS AND GYNECOLOGY | Age: 64
End: 2019-09-09

## 2019-09-09 DIAGNOSIS — R92.1 BREAST CALCIFICATIONS ON MAMMOGRAM: Primary | ICD-10-CM

## 2019-09-09 LAB
CYTOLOGIST CVX/VAG CYTO: NORMAL
CYTOLOGY CVX/VAG DOC CYTO: NORMAL
CYTOLOGY CVX/VAG DOC THIN PREP: NORMAL
DX ICD CODE: NORMAL
HIV 1 & 2 AB SER-IMP: NORMAL
HPV I/H RISK 4 DNA CVX QL PROBE+SIG AMP: NEGATIVE
OTHER STN SPEC: NORMAL
STAT OF ADQ CVX/VAG CYTO-IMP: NORMAL

## 2019-09-09 NOTE — TELEPHONE ENCOUNTER
I talked to the patient.  Mammogram shows bilateral calcifications and spot compression and mammography with magnification was recommended.  Bilateral spot compression mammograms are ordered.  Patient voices understanding.

## 2019-09-09 NOTE — TELEPHONE ENCOUNTER
----- Message from Lencho Nolen MD sent at 9/9/2019  2:46 PM EDT -----  Please notify pap is normal.

## 2019-09-10 ENCOUNTER — APPOINTMENT (OUTPATIENT)
Dept: WOMENS IMAGING | Facility: HOSPITAL | Age: 64
End: 2019-09-10

## 2019-09-10 PROCEDURE — 77066 DX MAMMO INCL CAD BI: CPT | Performed by: RADIOLOGY

## 2019-09-12 DIAGNOSIS — R92.1 BREAST CALCIFICATIONS ON MAMMOGRAM: Primary | ICD-10-CM

## 2019-10-04 ENCOUNTER — RESULTS ENCOUNTER (OUTPATIENT)
Dept: ENDOCRINOLOGY | Age: 64
End: 2019-10-04

## 2019-10-04 DIAGNOSIS — IMO0002 TYPE I DIABETES MELLITUS WITH NEUROLOGICAL MANIFESTATIONS, UNCONTROLLED: Primary | ICD-10-CM

## 2019-10-04 DIAGNOSIS — M81.0 OSTEOPOROSIS, POSTMENOPAUSAL: ICD-10-CM

## 2019-10-04 DIAGNOSIS — IMO0002 TYPE I DIABETES MELLITUS WITH NEUROLOGICAL MANIFESTATIONS, UNCONTROLLED: ICD-10-CM

## 2019-10-08 LAB
25(OH)D3+25(OH)D2 SERPL-MCNC: 38 NG/ML (ref 30–100)
ALBUMIN SERPL-MCNC: 4.1 G/DL (ref 3.5–5.2)
ALBUMIN/GLOB SERPL: 1.4 G/DL
ALP SERPL-CCNC: 87 U/L (ref 39–117)
ALT SERPL-CCNC: 23 U/L (ref 1–33)
AST SERPL-CCNC: 21 U/L (ref 1–32)
BILIRUB SERPL-MCNC: 0.6 MG/DL (ref 0.2–1.2)
BUN SERPL-MCNC: 11 MG/DL (ref 8–23)
BUN/CREAT SERPL: 12.2 (ref 7–25)
CALCIUM SERPL-MCNC: 8.8 MG/DL (ref 8.6–10.5)
CHLORIDE SERPL-SCNC: 90 MMOL/L (ref 98–107)
CO2 SERPL-SCNC: 28.7 MMOL/L (ref 22–29)
CREAT SERPL-MCNC: 0.9 MG/DL (ref 0.57–1)
GLOBULIN SER CALC-MCNC: 2.9 GM/DL
GLUCOSE SERPL-MCNC: 397 MG/DL (ref 65–99)
HBA1C MFR BLD: 9.1 % (ref 4.8–5.6)
POTASSIUM SERPL-SCNC: 4.7 MMOL/L (ref 3.5–5.2)
PROT SERPL-MCNC: 7 G/DL (ref 6–8.5)
PTH-INTACT SERPL-MCNC: 42 PG/ML (ref 15–65)
SODIUM SERPL-SCNC: 131 MMOL/L (ref 136–145)
T4 FREE SERPL-MCNC: 1.35 NG/DL (ref 0.93–1.7)
TSH SERPL DL<=0.005 MIU/L-ACNC: 1.23 UIU/ML (ref 0.27–4.2)
UNABLE TO VOID: NORMAL

## 2019-10-18 ENCOUNTER — OFFICE VISIT (OUTPATIENT)
Dept: ENDOCRINOLOGY | Age: 64
End: 2019-10-18

## 2019-10-18 VITALS
BODY MASS INDEX: 19.7 KG/M2 | HEART RATE: 74 BPM | DIASTOLIC BLOOD PRESSURE: 74 MMHG | WEIGHT: 133 LBS | SYSTOLIC BLOOD PRESSURE: 104 MMHG | HEIGHT: 69 IN | OXYGEN SATURATION: 98 %

## 2019-10-18 DIAGNOSIS — Z46.81 INSULIN PUMP TITRATION: ICD-10-CM

## 2019-10-18 DIAGNOSIS — IMO0002 DIABETES MELLITUS TYPE 1, UNCONTROLLED, WITH COMPLICATIONS: Primary | ICD-10-CM

## 2019-10-18 DIAGNOSIS — E16.1 HYPERINSULINISM: ICD-10-CM

## 2019-10-18 DIAGNOSIS — IMO0002 UNCONTROLLED TYPE 1 DIABETES WITH DIABETIC NEUROPATHY: ICD-10-CM

## 2019-10-18 DIAGNOSIS — M81.0 OSTEOPOROSIS, POSTMENOPAUSAL: ICD-10-CM

## 2019-10-18 DIAGNOSIS — IMO0002 UNCONTROLLED TYPE 1 DIABETES MELLITUS WITH NEPHROPATHY: ICD-10-CM

## 2019-10-18 PROCEDURE — 95251 CONT GLUC MNTR ANALYSIS I&R: CPT | Performed by: INTERNAL MEDICINE

## 2019-10-18 PROCEDURE — 99214 OFFICE O/P EST MOD 30 MIN: CPT | Performed by: INTERNAL MEDICINE

## 2020-01-24 LAB
25(OH)D3+25(OH)D2 SERPL-MCNC: 26.9 NG/ML (ref 30–100)
ALBUMIN SERPL-MCNC: 3.9 G/DL (ref 3.5–5.2)
ALBUMIN/CREAT UR: <2 MG/G CREAT (ref 0–29)
ALBUMIN/GLOB SERPL: 1.7 G/DL
ALP SERPL-CCNC: 74 U/L (ref 39–117)
ALT SERPL-CCNC: 21 U/L (ref 1–33)
AST SERPL-CCNC: 28 U/L (ref 1–32)
BILIRUB SERPL-MCNC: 0.5 MG/DL (ref 0.2–1.2)
BUN SERPL-MCNC: 12 MG/DL (ref 8–23)
BUN/CREAT SERPL: 13.3 (ref 7–25)
CALCIUM SERPL-MCNC: 8.6 MG/DL (ref 8.6–10.5)
CHLORIDE SERPL-SCNC: 97 MMOL/L (ref 98–107)
CO2 SERPL-SCNC: 24.6 MMOL/L (ref 22–29)
CREAT SERPL-MCNC: 0.9 MG/DL (ref 0.57–1)
CREAT UR-MCNC: 123.7 MG/DL
GLOBULIN SER CALC-MCNC: 2.3 GM/DL
GLUCOSE SERPL-MCNC: 243 MG/DL (ref 65–99)
HBA1C MFR BLD: 9.2 % (ref 4.8–5.6)
MICROALBUMIN UR-MCNC: <3 UG/ML
POTASSIUM SERPL-SCNC: 4.2 MMOL/L (ref 3.5–5.2)
PROT SERPL-MCNC: 6.2 G/DL (ref 6–8.5)
PTH-INTACT SERPL-MCNC: 42 PG/ML (ref 15–65)
SODIUM SERPL-SCNC: 134 MMOL/L (ref 136–145)
T4 FREE SERPL-MCNC: 1.15 NG/DL (ref 0.93–1.7)
TSH SERPL DL<=0.005 MIU/L-ACNC: 1.59 UIU/ML (ref 0.27–4.2)

## 2020-02-06 ENCOUNTER — OFFICE VISIT (OUTPATIENT)
Dept: ENDOCRINOLOGY | Age: 65
End: 2020-02-06

## 2020-02-06 VITALS
DIASTOLIC BLOOD PRESSURE: 78 MMHG | HEIGHT: 69 IN | HEART RATE: 75 BPM | BODY MASS INDEX: 19.55 KG/M2 | WEIGHT: 132 LBS | SYSTOLIC BLOOD PRESSURE: 124 MMHG | OXYGEN SATURATION: 98 %

## 2020-02-06 DIAGNOSIS — M81.0 OSTEOPOROSIS, POSTMENOPAUSAL: ICD-10-CM

## 2020-02-06 DIAGNOSIS — IMO0002 DIABETES MELLITUS TYPE 1, UNCONTROLLED, WITH COMPLICATIONS: Primary | ICD-10-CM

## 2020-02-06 DIAGNOSIS — IMO0002 UNCONTROLLED TYPE 1 DIABETES WITH DIABETIC NEUROPATHY: ICD-10-CM

## 2020-02-06 DIAGNOSIS — Z46.81 INSULIN PUMP TITRATION: ICD-10-CM

## 2020-02-06 DIAGNOSIS — IMO0002 UNCONTROLLED TYPE 1 DIABETES MELLITUS WITH NEPHROPATHY: ICD-10-CM

## 2020-02-06 DIAGNOSIS — E16.1 HYPERINSULINISM: ICD-10-CM

## 2020-02-06 PROCEDURE — 99214 OFFICE O/P EST MOD 30 MIN: CPT | Performed by: INTERNAL MEDICINE

## 2020-02-06 RX ORDER — PREGABALIN 50 MG/1
50 CAPSULE ORAL
Qty: 30 CAPSULE | Refills: 3 | Status: SHIPPED | OUTPATIENT
Start: 2020-02-06 | End: 2020-05-11 | Stop reason: SDUPTHER

## 2020-02-06 NOTE — PROGRESS NOTES
64 y.o.    Patient Care Team:  Aleja Lozano MD as PCP - General  Aleja Lozano MD as PCP - Family Medicine  Eugene Rodas MD as PCP - Internal Medicine (Orthopedic Surgery)    Chief Complaint:      F/U TYPE 1 DM, UNCONTROLLED.   HERE TO DISCUSS LAB RESULTS  Subjective     HPI   Patient is a 64-year-old white female with a history of uncontrolled type 1 diabetes mellitus with complications came for follow-up    Uncontrolled type 1 diabetes mellitus  Patient is currently using Medtronic 670 G with CGM  Sugars are improving  Denies any side effects  Test now with a new pump  Tingling numbness and burning in both lower extremities and is currently on Effexor  Uncontrolled type 1 diabetes mellitus with nephropathy  Patient is significant microalbuminuria  Denies any knowledge of diabetic retinopathy  Postmenopausal osteoporosis  She currently gets Reclast every year  Her last injection was 6 months ago      Interval History      The following portions of the patient's history were reviewed and updated as appropriate: allergies, current medications, past family history, past medical history, past social history, past surgical history and problem list.    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   • Hip fracture (CMS/HCC)     right- 2012, left 2013   • Osteoporosis      Family History   Problem Relation Age of Onset   • Cancer Father    • Diabetes Father    • Alzheimer's disease Mother         age 90   • Colon cancer Maternal Grandmother 80     Social History     Socioeconomic History   • Marital status:      Spouse name: Not on file   • Number of children: Not on file   • Years of education: Not on file   • Highest education level: Not on file   Tobacco Use   • Smoking status: Never Smoker   • Smokeless tobacco: Never Used   Substance and Sexual Activity   • Alcohol use: Yes      Alcohol/week: 1.0 standard drinks     Types: 1 Glasses of wine per week   • Drug use: No   • Sexual activity: Never     Partners: Male     Allergies   Allergen Reactions   • Iodinated Diagnostic Agents Unknown (See Comments)     SEVERE BACK PAIN  SEVERE BACK PAIN  SEVERE BACK PAIN   • Penicillins Hives   • Contrast Dye Other (See Comments)     Terrible back pains       Current Outpatient Medications:   •  Cholecalciferol (VITAMIN D3) 5000 units capsule capsule, Take 5,000 Units by mouth Daily., Disp: , Rfl:   •  CREON 63191-97507 units capsule delayed-release particles capsule, TK 2 CS PO TID, Disp: , Rfl: 3  •  FLUoxetine (PROzac) 20 MG capsule, Take 1 capsule by mouth Daily., Disp: , Rfl:   •  glucagon (GLUCAGON EMERGENCY) 1 MG injection, Inject 1 mg into the shoulder, thigh, or buttocks See Admin Instructions., Disp: 2 kit, Rfl: 1  •  glucose blood test strip, Janee Contour Next Test In Vitro Strip; Patient Sig: Janee Contour Next Test In Vitro Strip USE TO TEST FIVE TIMES DAILY OR AS DIRECTED; 450; 3; 12-Nov-2015; Active, Disp: , Rfl:   •  Insulin Infusion Pump device, Inject 1 each under the skin into the appropriate area as directed., Disp: , Rfl:   •  NOVOLOG 100 UNIT/ML injection, INJECT DAILY UNDER THE SKIN VIA INSULIN PUMP, Disp: 90 mL, Rfl: 1  •  traZODone (DESYREL) 150 MG tablet, TK 1/2 T PO QHS, Disp: , Rfl: 0  •  pregabalin (LYRICA) 50 MG capsule, Take 1 capsule by mouth every night at bedtime., Disp: 30 capsule, Rfl: 3    Current Facility-Administered Medications:   •  zoledronic acid (RECLAST) infusion 5 mg, 5 mg, Intravenous, Once, Grayson Levi MD        Review of Systems   Constitutional: Negative for chills, fatigue and fever.   Cardiovascular: Negative for chest pain and palpitations.   Gastrointestinal: Negative for abdominal pain, constipation, diarrhea, nausea and vomiting.   Endocrine: Negative for cold intolerance and heat intolerance.   All other systems reviewed and are  "negative.      Objective       Vitals:    02/06/20 1159   BP: 124/78   Pulse: 75   SpO2: 98%   Weight: 59.9 kg (132 lb)   Height: 175.3 cm (69\")     Body mass index is 19.49 kg/m².      Physical Exam   Constitutional: She is oriented to person, place, and time.   Eyes: Pupils are equal, round, and reactive to light. EOM are normal.   Neck: Normal range of motion. Neck supple. No thyromegaly present.   Cardiovascular: Normal rate, regular rhythm, normal heart sounds and intact distal pulses.   Pulmonary/Chest: Effort normal and breath sounds normal.   Abdominal: Soft. Bowel sounds are normal. She exhibits no distension. There is no tenderness.   Musculoskeletal: Normal range of motion. She exhibits no edema.   Neurological: She is alert and oriented to person, place, and time.   Skin: Skin is warm and dry.   Negative for acanthosis and vitiligo or purple striae   Psychiatric: She has a normal mood and affect. Her behavior is normal.   Nursing note and vitals reviewed.    Results Review:     I reviewed the patient's new clinical results.    Medical records reviewed  Summary:      Orders Only on 01/23/2020   Component Date Value Ref Range Status   • Glucose 01/23/2020 243* 65 - 99 mg/dL Final   • BUN 01/23/2020 12  8 - 23 mg/dL Final   • Creatinine 01/23/2020 0.90  0.57 - 1.00 mg/dL Final   • eGFR Non  Am 01/23/2020 63  >60 mL/min/1.73 Final   • eGFR African Am 01/23/2020 77  >60 mL/min/1.73 Final   • BUN/Creatinine Ratio 01/23/2020 13.3  7.0 - 25.0 Final   • Sodium 01/23/2020 134* 136 - 145 mmol/L Final   • Potassium 01/23/2020 4.2  3.5 - 5.2 mmol/L Final   • Chloride 01/23/2020 97* 98 - 107 mmol/L Final   • Total CO2 01/23/2020 24.6  22.0 - 29.0 mmol/L Final   • Calcium 01/23/2020 8.6  8.6 - 10.5 mg/dL Final   • Total Protein 01/23/2020 6.2  6.0 - 8.5 g/dL Final   • Albumin 01/23/2020 3.90  3.50 - 5.20 g/dL Final   • Globulin 01/23/2020 2.3  gm/dL Final   • A/G Ratio 01/23/2020 1.7  g/dL Final   • Total " Bilirubin 01/23/2020 0.5  0.2 - 1.2 mg/dL Final   • Alkaline Phosphatase 01/23/2020 74  39 - 117 U/L Final   • AST (SGOT) 01/23/2020 28  1 - 32 U/L Final   • ALT (SGPT) 01/23/2020 21  1 - 33 U/L Final   • Creatinine, Urine 01/23/2020 123.7  Not Estab. mg/dL Final   • Microalbumin, Urine 01/23/2020 <3.0  Not Estab. ug/mL Final   • Microalbumin/Creatinine Ratio 01/23/2020 <2  0 - 29 mg/g creat Final    Comment:                        Normal:                0 -  29                         Moderately increased: 30 - 300                         Severely Increased:       >300                **Please note reference interval change**     • Hemoglobin A1C 01/23/2020 9.20* 4.80 - 5.60 % Final    Comment: Hemoglobin A1C Ranges:  Increased Risk for Diabetes  5.7% to 6.4%  Diabetes                     >= 6.5%  Diabetic Goal                < 7.0%     • Free T4 01/23/2020 1.15  0.93 - 1.70 ng/dL Final    Results may be falsely increased if patient taking Biotin.   • TSH 01/23/2020 1.590  0.270 - 4.200 uIU/mL Final   • 25 Hydroxy, Vitamin D 01/23/2020 26.9* 30.0 - 100.0 ng/ml Final    Comment: Results may be falsely increased if patient taking Biotin.  Reference Range for Total Vitamin D 25(OH)  Deficiency <20.0 ng/mL  Insufficiency 21-29 ng/mL  Sufficiency  ng/mL  Toxicity >100 ng/ml     • PTH, Intact 01/23/2020 42  15 - 65 pg/mL Final     Lab Results   Component Value Date    HGBA1C 9.20 (H) 01/23/2020    HGBA1C 9.10 (H) 10/07/2019    HGBA1C 7.50 (H) 07/02/2019     Lab Results   Component Value Date    MICROALBUR <3.0 01/23/2020    CREATININE 0.90 01/23/2020     Imaging Results (Most Recent)     None                                    Assessment and Plan:    Opal was seen today for diabetes.    Diagnoses and all orders for this visit:    Diabetes mellitus type 1, uncontrolled, with complications (CMS/[facility])    Uncontrolled type 1 diabetes with diabetic neuropathy (CMS/[facility])  -     pregabalin (LYRICA) 50 MG capsule; Take 1  "capsule by mouth every night at bedtime.    Uncontrolled type 1 diabetes mellitus with nephropathy (CMS/[facility])    Insulin pump titration    Hyperinsulinism    Osteoporosis, postmenopausal    Medtronic CGM download reviewed  Overnight blood sugars appear stable but daytime blood sugars are fluctuating  Time in target also dropped to 73%  No trend for hypoglycemia noted    Patient had Reclast approximately 6 months ago  Patient needs refill on Lyrica    Hemoglobin A1c went up to 9.2%  Mostly noncompliance during holidays    Encouraged the patient to control blood sugars during the day after the meal  Advised the patient to see the diabetes pump educator today for further adjustments  Patient return to follow-up in 3 months      Grayson Levi MD. FACE    02/19/20      EMR Dragon / transcription disclaimer:     \"Dictated utilizing Dragon dictation\".         "

## 2020-05-05 ENCOUNTER — TELEPHONE (OUTPATIENT)
Dept: ENDOCRINOLOGY | Age: 65
End: 2020-05-05

## 2020-05-05 NOTE — TELEPHONE ENCOUNTER
RETURNED PT'S V/M CALL REGARDING UPCOMING APPT WITH DR ARCHULETA. LEFT PT MSG TO CONTACT OFFICE REGARDING APPT.

## 2020-05-11 ENCOUNTER — OFFICE VISIT (OUTPATIENT)
Dept: ENDOCRINOLOGY | Age: 65
End: 2020-05-11

## 2020-05-11 ENCOUNTER — RESULTS ENCOUNTER (OUTPATIENT)
Dept: ENDOCRINOLOGY | Age: 65
End: 2020-05-11

## 2020-05-11 DIAGNOSIS — IMO0002 UNCONTROLLED TYPE 1 DIABETES MELLITUS WITH NEPHROPATHY: ICD-10-CM

## 2020-05-11 DIAGNOSIS — IMO0002 DIABETES MELLITUS TYPE 1, UNCONTROLLED, WITH COMPLICATIONS: ICD-10-CM

## 2020-05-11 DIAGNOSIS — IMO0002 UNCONTROLLED TYPE 1 DIABETES WITH DIABETIC NEUROPATHY: ICD-10-CM

## 2020-05-11 DIAGNOSIS — E16.1 HYPERINSULINISM: ICD-10-CM

## 2020-05-11 DIAGNOSIS — M81.0 OSTEOPOROSIS, POSTMENOPAUSAL: ICD-10-CM

## 2020-05-11 DIAGNOSIS — Z46.81 INSULIN PUMP TITRATION: ICD-10-CM

## 2020-05-11 DIAGNOSIS — IMO0002 DIABETES MELLITUS TYPE 1, UNCONTROLLED, WITH COMPLICATIONS: Primary | ICD-10-CM

## 2020-05-11 PROCEDURE — 95251 CONT GLUC MNTR ANALYSIS I&R: CPT | Performed by: INTERNAL MEDICINE

## 2020-05-11 PROCEDURE — 99214 OFFICE O/P EST MOD 30 MIN: CPT | Performed by: INTERNAL MEDICINE

## 2020-05-11 RX ORDER — PREGABALIN 50 MG/1
50 CAPSULE ORAL
Qty: 30 CAPSULE | Refills: 3 | Status: SHIPPED | OUTPATIENT
Start: 2020-05-11 | End: 2020-05-13 | Stop reason: SDUPTHER

## 2020-05-11 NOTE — PROGRESS NOTES
65 y.o.    Patient Care Team:  Aleja Lozano MD as PCP - General  Aleja Lozano MD as PCP - Family Medicine  Eugene Rodas MD as PCP - Internal Medicine (Orthopedic Surgery)    Chief Complaint:    Uncontrolled type 1 diabetes mellitus with complications  Subjective   Visit type: Video visit  Doximity  Total time spent:18 min  Consent:  You have chosen to receive care through a telehealth visit.  Do you consent to use a video/audio connection for your medical care today? Yes  Unable to complete visit using a video connection to the patient. A phone visit was used to complete this visits. Total time of discussion was 18 minutes.  HPI   Patient is a 65-year-old white female with a history of uncontrolled type 1 diabetes mellitus with complications came for follow-up    Uncontrolled type 1 diabetes mellitus  Currently using Medtronic 670 G with CGM  She reports that her blood sugars are gradually getting better  She denies any problems with the pump  Uncontrolled type 1 diabetes mellitus with neuropathy  Patient continues to complain of tingling numbness and burning despite being on Effexor and wants to try Lyrica  Apparently Lyrica was not approved by insurance in January  Uncontrolled type 1 diabetes mellitus with nephropathy  Patient has significant microalbuminuria  Patient denies any knowledge of diabetic retinopathy  Postmenopausal osteoporosis  Patient needs to get Reclast every year her last injection was approximately 1 year ago through primary care      Interval History      The following portions of the patient's history were reviewed and updated as appropriate: allergies, current medications, past family history, past medical history, past social history, past surgical history and problem list.    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   • Hip fracture (CMS/[facility])      right- 2012, left 2013   • Osteoporosis      Family History   Problem Relation Age of Onset   • Cancer Father    • Diabetes Father    • Alzheimer's disease Mother         age 90   • Colon cancer Maternal Grandmother 80     Social History     Socioeconomic History   • Marital status:      Spouse name: Not on file   • Number of children: Not on file   • Years of education: Not on file   • Highest education level: Not on file   Tobacco Use   • Smoking status: Never Smoker   • Smokeless tobacco: Never Used   Substance and Sexual Activity   • Alcohol use: Yes     Alcohol/week: 1.0 standard drinks     Types: 1 Glasses of wine per week   • Drug use: No   • Sexual activity: Never     Partners: Male     Allergies   Allergen Reactions   • Iodinated Diagnostic Agents Unknown (See Comments)     SEVERE BACK PAIN  SEVERE BACK PAIN  SEVERE BACK PAIN   • Penicillins Hives   • Contrast Dye Other (See Comments)     Terrible back pains       Current Outpatient Medications:   •  Cholecalciferol (VITAMIN D3) 5000 units capsule capsule, Take 5,000 Units by mouth Daily., Disp: , Rfl:   •  CREON 45883-47109 units capsule delayed-release particles capsule, TK 2 CS PO TID, Disp: , Rfl: 3  •  FLUoxetine (PROzac) 20 MG capsule, Take 1 capsule by mouth Daily., Disp: , Rfl:   •  glucagon (GLUCAGON EMERGENCY) 1 MG injection, Inject 1 mg into the shoulder, thigh, or buttocks See Admin Instructions., Disp: 2 kit, Rfl: 1  •  glucose blood test strip, Janee Contour Next Test In Vitro Strip; Patient Sig: Janee Contour Next Test In Vitro Strip USE TO TEST FIVE TIMES DAILY OR AS DIRECTED; 450; 3; 12-Nov-2015; Active, Disp: , Rfl:   •  Insulin Infusion Pump device, Inject 1 each under the skin into the appropriate area as directed., Disp: , Rfl:   •  NOVOLOG 100 UNIT/ML injection, INJECT DAILY UNDER THE SKIN VIA INSULIN PUMP, Disp: 90 mL, Rfl: 1  •  pregabalin (Lyrica) 50 MG capsule, Take 1 capsule by mouth every night at bedtime., Disp: 30  capsule, Rfl: 3  •  traZODone (DESYREL) 150 MG tablet, TK 1/2 T PO QHS, Disp: , Rfl: 0    Current Facility-Administered Medications:   •  zoledronic acid (RECLAST) infusion 5 mg, 5 mg, Intravenous, Once, Grayson Levi MD        Review of Systems   Constitutional: Positive for fatigue.   Respiratory: Negative.    Cardiovascular: Negative.    Gastrointestinal: Negative.    Neurological: Positive for dizziness and numbness.   All other systems reviewed and are negative.      Objective       There were no vitals filed for this visit.  There is no height or weight on file to calculate BMI.  Physical exam and vital signs not documented due to video visit    Physical Exam  Results Review:     I reviewed the patient's new clinical results.    Medical records reviewed  Summary:      Orders Only on 01/23/2020   Component Date Value Ref Range Status   • Glucose 01/23/2020 243* 65 - 99 mg/dL Final   • BUN 01/23/2020 12  8 - 23 mg/dL Final   • Creatinine 01/23/2020 0.90  0.57 - 1.00 mg/dL Final   • eGFR Non  Am 01/23/2020 63  >60 mL/min/1.73 Final   • eGFR African Am 01/23/2020 77  >60 mL/min/1.73 Final   • BUN/Creatinine Ratio 01/23/2020 13.3  7.0 - 25.0 Final   • Sodium 01/23/2020 134* 136 - 145 mmol/L Final   • Potassium 01/23/2020 4.2  3.5 - 5.2 mmol/L Final   • Chloride 01/23/2020 97* 98 - 107 mmol/L Final   • Total CO2 01/23/2020 24.6  22.0 - 29.0 mmol/L Final   • Calcium 01/23/2020 8.6  8.6 - 10.5 mg/dL Final   • Total Protein 01/23/2020 6.2  6.0 - 8.5 g/dL Final   • Albumin 01/23/2020 3.90  3.50 - 5.20 g/dL Final   • Globulin 01/23/2020 2.3  gm/dL Final   • A/G Ratio 01/23/2020 1.7  g/dL Final   • Total Bilirubin 01/23/2020 0.5  0.2 - 1.2 mg/dL Final   • Alkaline Phosphatase 01/23/2020 74  39 - 117 U/L Final   • AST (SGOT) 01/23/2020 28  1 - 32 U/L Final   • ALT (SGPT) 01/23/2020 21  1 - 33 U/L Final   • Creatinine, Urine 01/23/2020 123.7  Not Estab. mg/dL Final   • Microalbumin, Urine 01/23/2020 <3.0   Not Estab. ug/mL Final   • Microalbumin/Creatinine Ratio 01/23/2020 <2  0 - 29 mg/g creat Final    Comment:                        Normal:                0 -  29                         Moderately increased: 30 - 300                         Severely Increased:       >300                **Please note reference interval change**     • Hemoglobin A1C 01/23/2020 9.20* 4.80 - 5.60 % Final    Comment: Hemoglobin A1C Ranges:  Increased Risk for Diabetes  5.7% to 6.4%  Diabetes                     >= 6.5%  Diabetic Goal                < 7.0%     • Free T4 01/23/2020 1.15  0.93 - 1.70 ng/dL Final    Results may be falsely increased if patient taking Biotin.   • TSH 01/23/2020 1.590  0.270 - 4.200 uIU/mL Final   • 25 Hydroxy, Vitamin D 01/23/2020 26.9* 30.0 - 100.0 ng/ml Final    Comment: Results may be falsely increased if patient taking Biotin.  Reference Range for Total Vitamin D 25(OH)  Deficiency <20.0 ng/mL  Insufficiency 21-29 ng/mL  Sufficiency  ng/mL  Toxicity >100 ng/ml     • PTH, Intact 01/23/2020 42  15 - 65 pg/mL Final     Lab Results   Component Value Date    HGBA1C 9.20 (H) 01/23/2020    HGBA1C 9.10 (H) 10/07/2019    HGBA1C 7.50 (H) 07/02/2019     Lab Results   Component Value Date    MICROALBUR <3.0 01/23/2020    CREATININE 0.90 01/23/2020     Imaging Results (Most Recent)     None          Assessment and Plan:    Diagnoses and all orders for this visit:    Diabetes mellitus type 1, uncontrolled, with complications (CMS/[facility])  -     Comprehensive Metabolic Panel; Future  -     Hemoglobin A1c; Future  -     Microalbumin / Creatinine Urine Ratio - Urine, Clean Catch; Future  -     TSH; Future    Uncontrolled type 1 diabetes with diabetic neuropathy (CMS/[facility])  -     Comprehensive Metabolic Panel; Future  -     Hemoglobin A1c; Future  -     Microalbumin / Creatinine Urine Ratio - Urine, Clean Catch; Future  -     TSH; Future  -     pregabalin (Lyrica) 50 MG capsule; Take 1 capsule by mouth every night at  "bedtime.    Uncontrolled type 1 diabetes mellitus with nephropathy (CMS/[facility])  -     Comprehensive Metabolic Panel; Future  -     Hemoglobin A1c; Future  -     Microalbumin / Creatinine Urine Ratio - Urine, Clean Catch; Future  -     TSH; Future    Insulin pump titration    Hyperinsulinism    Osteoporosis, postmenopausal    Patient's last hemoglobin A1c is 9.2%  Patient is currently using Medtronic 670 G with CGM    Medtronic CGM download reviewed  Time in target range is down to 65% slightly lower than before  But overall appears to be steady trend without any hypoglycemic trends  Discussed with the Medtronic pump educator  Mild adjustments were made and patient is advised to upload again in 2 weeks    Patient return to follow-up in 3 months with lab tests    Grayson Levi MD. FACE    05/11/20      EMR Dragon / transcription disclaimer:     \"Dictated utilizing Dragon dictation\".         "

## 2020-05-13 DIAGNOSIS — IMO0002 UNCONTROLLED TYPE 1 DIABETES WITH DIABETIC NEUROPATHY: ICD-10-CM

## 2020-05-13 RX ORDER — PREGABALIN 50 MG/1
50 CAPSULE ORAL
Qty: 30 CAPSULE | Refills: 3 | OUTPATIENT
Start: 2020-05-13 | End: 2020-08-20

## 2020-05-16 ENCOUNTER — RESULTS ENCOUNTER (OUTPATIENT)
Dept: ENDOCRINOLOGY | Age: 65
End: 2020-05-16

## 2020-05-16 DIAGNOSIS — IMO0002 DIABETES MELLITUS TYPE 1, UNCONTROLLED, WITH COMPLICATIONS: ICD-10-CM

## 2020-05-16 DIAGNOSIS — IMO0002 UNCONTROLLED TYPE 1 DIABETES WITH DIABETIC NEUROPATHY: ICD-10-CM

## 2020-05-16 DIAGNOSIS — IMO0002 UNCONTROLLED TYPE 1 DIABETES MELLITUS WITH NEPHROPATHY: ICD-10-CM

## 2020-05-20 ENCOUNTER — APPOINTMENT (OUTPATIENT)
Dept: WOMENS IMAGING | Facility: HOSPITAL | Age: 65
End: 2020-05-20

## 2020-05-20 PROCEDURE — 77066 DX MAMMO INCL CAD BI: CPT | Performed by: RADIOLOGY

## 2020-05-20 PROCEDURE — 77062 BREAST TOMOSYNTHESIS BI: CPT | Performed by: RADIOLOGY

## 2020-05-20 PROCEDURE — G0279 TOMOSYNTHESIS, MAMMO: HCPCS | Performed by: RADIOLOGY

## 2020-06-04 DIAGNOSIS — M81.0 OSTEOPOROSIS, POSTMENOPAUSAL: Primary | ICD-10-CM

## 2020-06-08 RX ORDER — SODIUM CHLORIDE 9 MG/ML
250 INJECTION, SOLUTION INTRAVENOUS ONCE
Status: CANCELLED | OUTPATIENT
Start: 2020-06-08

## 2020-06-10 RX ORDER — SODIUM CHLORIDE 9 MG/ML
250 INJECTION, SOLUTION INTRAVENOUS ONCE
Status: CANCELLED | OUTPATIENT
Start: 2021-06-10

## 2020-06-18 ENCOUNTER — HOSPITAL ENCOUNTER (OUTPATIENT)
Dept: INFUSION THERAPY | Facility: HOSPITAL | Age: 65
Discharge: HOME | End: 2020-06-18
Admitting: INTERNAL MEDICINE

## 2020-06-18 VITALS
DIASTOLIC BLOOD PRESSURE: 81 MMHG | HEART RATE: 68 BPM | RESPIRATION RATE: 20 BRPM | SYSTOLIC BLOOD PRESSURE: 147 MMHG | BODY MASS INDEX: 19.05 KG/M2 | TEMPERATURE: 98.5 F | OXYGEN SATURATION: 100 % | WEIGHT: 129 LBS

## 2020-06-18 DIAGNOSIS — M81.0 OSTEOPOROSIS, POSTMENOPAUSAL: Primary | ICD-10-CM

## 2020-06-18 LAB
ANION GAP SERPL CALCULATED.3IONS-SCNC: 11.1 MMOL/L (ref 5–15)
BUN BLD-MCNC: 13 MG/DL (ref 8–23)
BUN/CREAT SERPL: 18.1 (ref 7–25)
CALCIUM SPEC-SCNC: 8.8 MG/DL (ref 8.6–10.5)
CHLORIDE SERPL-SCNC: 96 MMOL/L (ref 98–107)
CO2 SERPL-SCNC: 23.9 MMOL/L (ref 22–29)
CREAT BLD-MCNC: 0.72 MG/DL (ref 0.57–1)
GFR SERPL CREATININE-BSD FRML MDRD: 82 ML/MIN/1.73
GLUCOSE BLD-MCNC: 243 MG/DL (ref 65–99)
POTASSIUM BLD-SCNC: 4.5 MMOL/L (ref 3.5–5.2)
SODIUM BLD-SCNC: 131 MMOL/L (ref 136–145)

## 2020-06-18 PROCEDURE — 96365 THER/PROPH/DIAG IV INF INIT: CPT

## 2020-06-18 PROCEDURE — 25010000002 ZOLEDRONIC ACID 5 MG/100ML SOLUTION: Performed by: INTERNAL MEDICINE

## 2020-06-18 PROCEDURE — 80048 BASIC METABOLIC PNL TOTAL CA: CPT | Performed by: INTERNAL MEDICINE

## 2020-06-18 PROCEDURE — 36415 COLL VENOUS BLD VENIPUNCTURE: CPT

## 2020-06-18 RX ORDER — ZOLEDRONIC ACID 5 MG/100ML
5 INJECTION, SOLUTION INTRAVENOUS ONCE
Status: COMPLETED | OUTPATIENT
Start: 2020-06-18 | End: 2020-06-18

## 2020-06-18 RX ORDER — SODIUM CHLORIDE 9 MG/ML
250 INJECTION, SOLUTION INTRAVENOUS ONCE
Status: DISCONTINUED | OUTPATIENT
Start: 2020-06-18 | End: 2020-06-20 | Stop reason: HOSPADM

## 2020-06-18 RX ORDER — ZOLEDRONIC ACID 5 MG/100ML
5 INJECTION, SOLUTION INTRAVENOUS ONCE
Status: CANCELLED | OUTPATIENT
Start: 2021-06-10

## 2020-06-18 RX ORDER — SODIUM CHLORIDE 9 MG/ML
250 INJECTION, SOLUTION INTRAVENOUS ONCE
Status: CANCELLED | OUTPATIENT
Start: 2021-06-10

## 2020-06-18 RX ADMIN — ZOLEDRONIC ACID 5 MG: 5 INJECTION, SOLUTION INTRAVENOUS at 12:19

## 2020-06-18 NOTE — PATIENT INSTRUCTIONS
Zoledronic Acid injection (Paget's Disease, Osteoporosis)  What is this medicine?  ZOLEDRONIC ACID (DERICK le maría elenan ik AS id) lowers the amount of calcium loss from bone. It is used to treat Paget's disease and osteoporosis in women.  This medicine may be used for other purposes; ask your health care provider or pharmacist if you have questions.  COMMON BRAND NAME(S): Reclast, Zometa  What should I tell my health care provider before I take this medicine?  They need to know if you have any of these conditions:  · aspirin-sensitive asthma  · cancer, especially if you are receiving medicines used to treat cancer  · dental disease or wear dentures  · infection  · kidney disease  · low levels of calcium in the blood  · past surgery on the parathyroid gland or intestines  · receiving corticosteroids like dexamethasone or prednisone  · an unusual or allergic reaction to zoledronic acid, other medicines, foods, dyes, or preservatives  · pregnant or trying to get pregnant  · breast-feeding  How should I use this medicine?  This medicine is for infusion into a vein. It is given by a health care professional in a hospital or clinic setting.  Talk to your pediatrician regarding the use of this medicine in children. This medicine is not approved for use in children.  Overdosage: If you think you have taken too much of this medicine contact a poison control center or emergency room at once.  NOTE: This medicine is only for you. Do not share this medicine with others.  What if I miss a dose?  It is important not to miss your dose. Call your doctor or health care professional if you are unable to keep an appointment.  What may interact with this medicine?  · certain antibiotics given by injection  · NSAIDs, medicines for pain and inflammation, like ibuprofen or naproxen  · some diuretics like bumetanide, furosemide  · teriparatide  This list may not describe all possible interactions. Give your health care provider a list of all the  medicines, herbs, non-prescription drugs, or dietary supplements you use. Also tell them if you smoke, drink alcohol, or use illegal drugs. Some items may interact with your medicine.  What should I watch for while using this medicine?  Visit your doctor or health care professional for regular checkups. It may be some time before you see the benefit from this medicine. Do not stop taking your medicine unless your doctor tells you to. Your doctor may order blood tests or other tests to see how you are doing.  Women should inform their doctor if they wish to become pregnant or think they might be pregnant. There is a potential for serious side effects to an unborn child. Talk to your health care professional or pharmacist for more information.  You should make sure that you get enough calcium and vitamin D while you are taking this medicine. Discuss the foods you eat and the vitamins you take with your health care professional.  Some people who take this medicine have severe bone, joint, and/or muscle pain. This medicine may also increase your risk for jaw problems or a broken thigh bone. Tell your doctor right away if you have severe pain in your jaw, bones, joints, or muscles. Tell your doctor if you have any pain that does not go away or that gets worse.  Tell your dentist and dental surgeon that you are taking this medicine. You should not have major dental surgery while on this medicine. See your dentist to have a dental exam and fix any dental problems before starting this medicine. Take good care of your teeth while on this medicine. Make sure you see your dentist for regular follow-up appointments.  What side effects may I notice from receiving this medicine?  Side effects that you should report to your doctor or health care professional as soon as possible:  · allergic reactions like skin rash, itching or hives, swelling of the face, lips, or tongue  · anxiety, confusion, or depression  · breathing  problems  · changes in vision  · eye pain  · feeling faint or lightheaded, falls  · jaw pain, especially after dental work  · mouth sores  · muscle cramps, stiffness, or weakness  · redness, blistering, peeling or loosening of the skin, including inside the mouth  · trouble passing urine or change in the amount of urine  Side effects that usually do not require medical attention (report to your doctor or health care professional if they continue or are bothersome):  · bone, joint, or muscle pain  · constipation  · diarrhea  · fever  · hair loss  · irritation at site where injected  · loss of appetite  · nausea, vomiting  · stomach upset  · trouble sleeping  · trouble swallowing  · weak or tired  This list may not describe all possible side effects. Call your doctor for medical advice about side effects. You may report side effects to FDA at 3-678-FDA-4006.  Where should I keep my medicine?  This drug is given in a hospital or clinic and will not be stored at home.  NOTE: This sheet is a summary. It may not cover all possible information. If you have questions about this medicine, talk to your doctor, pharmacist, or health care provider.  © 2020 [employer] (2015-05-16 14:19:57)

## 2020-06-29 ENCOUNTER — RESULTS ENCOUNTER (OUTPATIENT)
Dept: ENDOCRINOLOGY | Age: 65
End: 2020-06-29

## 2020-06-29 DIAGNOSIS — IMO0002 DIABETES MELLITUS TYPE 1, UNCONTROLLED, WITH COMPLICATIONS: ICD-10-CM

## 2020-06-29 DIAGNOSIS — IMO0002 UNCONTROLLED TYPE 1 DIABETES MELLITUS WITH NEPHROPATHY: ICD-10-CM

## 2020-06-29 DIAGNOSIS — IMO0002 UNCONTROLLED TYPE 1 DIABETES WITH DIABETIC NEUROPATHY: ICD-10-CM

## 2020-08-04 DIAGNOSIS — IMO0002 UNCONTROLLED TYPE 1 DIABETES MELLITUS WITH NEPHROPATHY: ICD-10-CM

## 2020-08-04 DIAGNOSIS — M81.0 OSTEOPOROSIS, POSTMENOPAUSAL: ICD-10-CM

## 2020-08-04 DIAGNOSIS — E16.1 HYPERINSULINISM: Primary | ICD-10-CM

## 2020-08-04 DIAGNOSIS — E16.1 HYPOGLYCEMIA DUE TO ENDOGENOUS HYPERINSULINEMIA: ICD-10-CM

## 2020-08-04 DIAGNOSIS — IMO0002 DIABETES MELLITUS TYPE 1, UNCONTROLLED, WITH COMPLICATIONS: ICD-10-CM

## 2020-08-07 LAB
25(OH)D3+25(OH)D2 SERPL-MCNC: 18.3 NG/ML (ref 30–100)
ALBUMIN SERPL-MCNC: 4.1 G/DL (ref 3.8–4.8)
ALBUMIN/CREAT UR: <10 MG/G CREAT (ref 0–29)
ALBUMIN/GLOB SERPL: 1.4 {RATIO} (ref 1.2–2.2)
ALP SERPL-CCNC: 117 IU/L (ref 39–117)
ALT SERPL-CCNC: 47 IU/L (ref 0–32)
AST SERPL-CCNC: 70 IU/L (ref 0–40)
BILIRUB SERPL-MCNC: 0.6 MG/DL (ref 0–1.2)
BUN SERPL-MCNC: 11 MG/DL (ref 8–27)
BUN/CREAT SERPL: 15 (ref 12–28)
CALCIUM SERPL-MCNC: 9.1 MG/DL (ref 8.7–10.3)
CHLORIDE SERPL-SCNC: 96 MMOL/L (ref 96–106)
CO2 SERPL-SCNC: 23 MMOL/L (ref 20–29)
CREAT SERPL-MCNC: 0.75 MG/DL (ref 0.57–1)
CREAT UR-MCNC: 29 MG/DL
GLOBULIN SER CALC-MCNC: 3 G/DL (ref 1.5–4.5)
GLUCOSE SERPL-MCNC: 380 MG/DL (ref 65–99)
HBA1C MFR BLD: 9.6 % (ref 4.8–5.6)
MICROALBUMIN UR-MCNC: <3 UG/ML
POTASSIUM SERPL-SCNC: 4 MMOL/L (ref 3.5–5.2)
PROT SERPL-MCNC: 7.1 G/DL (ref 6–8.5)
PTH-INTACT SERPL-MCNC: 31 PG/ML (ref 15–65)
SODIUM SERPL-SCNC: 133 MMOL/L (ref 134–144)
TSH SERPL DL<=0.005 MIU/L-ACNC: 1.53 UIU/ML (ref 0.45–4.5)

## 2020-08-10 ENCOUNTER — OFFICE VISIT (OUTPATIENT)
Dept: OBSTETRICS AND GYNECOLOGY | Age: 65
End: 2020-08-10

## 2020-08-10 VITALS
BODY MASS INDEX: 19.4 KG/M2 | WEIGHT: 131 LBS | HEIGHT: 69 IN | DIASTOLIC BLOOD PRESSURE: 72 MMHG | SYSTOLIC BLOOD PRESSURE: 122 MMHG

## 2020-08-10 DIAGNOSIS — N95.2 VAGINAL ATROPHY: ICD-10-CM

## 2020-08-10 DIAGNOSIS — N89.8 VAGINAL DISCHARGE: Primary | ICD-10-CM

## 2020-08-10 PROCEDURE — 99213 OFFICE O/P EST LOW 20 MIN: CPT | Performed by: PHYSICIAN ASSISTANT

## 2020-08-10 RX ORDER — VALACYCLOVIR HYDROCHLORIDE 1 G/1
1000 TABLET, FILM COATED ORAL 2 TIMES DAILY
Qty: 20 TABLET | Refills: 0 | Status: SHIPPED | OUTPATIENT
Start: 2020-08-10 | End: 2020-12-11 | Stop reason: SDUPTHER

## 2020-08-10 RX ORDER — CLOTRIMAZOLE AND BETAMETHASONE DIPROPIONATE 10; .64 MG/G; MG/G
CREAM TOPICAL EVERY 12 HOURS SCHEDULED
Qty: 15 G | Refills: 0 | Status: SHIPPED | OUTPATIENT
Start: 2020-08-10 | End: 2020-12-11

## 2020-08-10 NOTE — PROGRESS NOTES
"Subjective     Chief Complaint   Patient presents with   • Gynecologic Exam     follow up herpes       Opal Gutierrez is a 65 y.o.  whose LMP is No LMP recorded. Patient is postmenopausal. presents with lesions and sores  Has h/o herpes in the past  Sx's present for a few months  Has used a lubricant and is washing with summer's tracee    No new partners  No new med issues    She is diabetic, says her A1C is 7.0  Denies d/c    Pt of Dr Nolen    No Additional Complaints Reported    The following portions of the patient's history were reviewed and updated as appropriate:vital signs, allergies, current medications, past family history, past medical history, past social history, past surgical history and problem list      Review of Systems   Genitourinary:positive for genital lesions     Objective      /72   Ht 175.3 cm (69\")   Wt 59.4 kg (131 lb)   Breastfeeding No   BMI 19.35 kg/m²      Physical Exam   Genitourinary:         Genitourinary Comments: Lacerations/tears noted on mucous membranes, see pic. No ulcerations or oozing noted       General:   alert, comfortable and no distress   Heart: Not performed today   Lungs: Not performed today.   Breast: Not performed today   Neck: na   Abdomen: { Not performed today   CVA: Not performed today   Pelvis: External genitalia: atrophic changes noted  Vaginal: atrophic mucosa, inflamed mucosa and swollen. See pic. Introitus is nearly strictured. Speculum exam was avoided. q tip was inserted at opening to obtain sampling. No obvious d/c noted   Extremities: Not performed today   Neurologic: negative   Psychiatric: Normal affect, judgement, and mood       Lab Review   Labs: No data reviewed     Imaging   No data reviewed    Assessment/Plan     ASSESSMENT  1. Vaginal discharge    2. Vaginal atrophy        PLAN  1.   Orders Placed This Encounter   Procedures   • NuSwab BV & Candida - Swab, Vagina      2. Medications prescribed this encounter:        New Medications " Ordered This Visit   Medications   • clotrimazole-betamethasone (Lotrisone) 1-0.05 % cream     Sig: Apply  topically to the appropriate area as directed Every 12 (Twelve) Hours.     Dispense:  15 g     Refill:  0   • valACYclovir (Valtrex) 1000 MG tablet     Sig: Take 1 tablet by mouth 2 (Two) Times a Day.     Dispense:  20 tablet     Refill:  0       3. I don't suspect herpes but given her history, I am going to start her on valtrex. Also start topical med for irritation and inflammation.  Avoid soaps externally. Use water and topical emollient to protect area.  Also gave sample of premarin for her to use daily for 2 wks    Pt requested name of dermatologist that she has seen previously. I could not find that in her notes/referrals/media or care everywhere.     Follow up: 3 month(s) for annual    COOPER Aguirre  8/10/2020

## 2020-08-13 ENCOUNTER — TELEPHONE (OUTPATIENT)
Dept: OBSTETRICS AND GYNECOLOGY | Age: 65
End: 2020-08-13

## 2020-08-13 LAB
A VAGINAE DNA VAG QL NAA+PROBE: ABNORMAL SCORE
BVAB2 DNA VAG QL NAA+PROBE: ABNORMAL SCORE
C ALBICANS DNA VAG QL NAA+PROBE: POSITIVE
C GLABRATA DNA VAG QL NAA+PROBE: NEGATIVE
MEGA1 DNA VAG QL NAA+PROBE: ABNORMAL SCORE

## 2020-08-13 RX ORDER — FLUCONAZOLE 150 MG/1
150 TABLET ORAL ONCE
Qty: 2 TABLET | Refills: 0 | Status: SHIPPED | OUTPATIENT
Start: 2020-08-13 | End: 2020-08-13

## 2020-08-13 RX ORDER — FLUCONAZOLE 150 MG/1
150 TABLET ORAL ONCE
Qty: 2 TABLET | Refills: 0 | Status: SHIPPED | OUTPATIENT
Start: 2020-08-13 | End: 2020-08-13 | Stop reason: SDUPTHER

## 2020-08-13 NOTE — TELEPHONE ENCOUNTER
----- Message from COOPER Frank sent at 8/13/2020  9:28 AM EDT -----  Notify pt that her vaginal swab is neg for bv but + for yeast. I sent in diflucan for her to take. She can c/w the topical medication I sent in as well

## 2020-08-18 NOTE — PROGRESS NOTES
Subjective   Opal Gutierrez is a 65 y.o. female.     F/u from dr Puente for dm 1/ testing bs x 3  day / last dm eye exam 5/18/20 with dr Overton / last dm foot exam today with dr Devi      Patient is a 65-year-old female who came in for follow-up.  She is a patient of Dr. Levi who has left the practice.  She is new to me.    She has known diabetes mellitus since her 20s and was started on insulin a few years later.  She has been using an insulin pump since 2014.  She is on Medtronic 670 G insulin pump with a continuous glucose sensor.  She is on auto mode 61% of the time.    Pump settings:  Basal rate: 0.5 units/h.    Carbohydrate ratio: 12 midnight is 36.  7 AM is 28.  9 AM is 13.0.  12 PM is 17.0.  6 PM is 20.0.  9 PM is 25.0.    Sensitivity: 120.    Target: 100-150.    Sensor data reviewed.  Average glucose 185.  Total daily insulin 22 units.  Time in range is 72%.  Time above range 25%.  She had 2 hypoglycemic events between 12 AM and 3 AM.  Her last meal was 8 AM.    Her last eye examination was May 2020 and she has no retinopathy.  Urine microalbumin is normal in August 2020.  She has numbness in both feet and has difficulty with balance when she walks.    She has no history of hyperlipidemia or celiac disease.    She has a history of pancreatitis around 1995 thought to be due to alcohol use.  She has been having diarrhea recently and restarted on Creon a few months ago.  Diarrhea has resolved.      She has osteoporosis which is being managed by her gynecologist.  She has been receiving Reclast yearly.  She is on an unknown vitamin D supplement.  25 hydroxy vitamin D done in August 2020 is low at 18.3 ng/mL.    The following portions of the patient's history were reviewed and updated as appropriate: allergies, current medications, past family history, past medical history, past social history, past surgical history and problem list.    Review of Systems   Constitutional: Negative.  Negative for  "chills, fatigue and fever.   HENT: Negative.    Eyes: Negative.    Respiratory: Negative.  Negative for chest tightness, shortness of breath and wheezing.    Cardiovascular: Negative.  Negative for chest pain, palpitations and leg swelling.   Gastrointestinal: Negative.  Negative for abdominal distention, abdominal pain, anal bleeding, blood in stool, constipation and diarrhea.   Endocrine: Negative.    Genitourinary: Negative.  Negative for difficulty urinating, frequency and urgency.   Musculoskeletal: Negative.  Negative for back pain, joint swelling and neck pain.   Neurological: Positive for numbness (feet ). Negative for dizziness, tremors, seizures, light-headedness and headaches.   Hematological: Bruises/bleeds easily (bruise ).   Psychiatric/Behavioral: Negative.  Negative for agitation, confusion and sleep disturbance. The patient is not nervous/anxious.      Objective      Vitals:    08/20/20 0843   BP: 134/70   BP Location: Right arm   Patient Position: Sitting   Cuff Size: Small Adult   Pulse: 69   SpO2: 99%   Weight: 58.2 kg (128 lb 3.2 oz)   Height: 175.3 cm (69.02\")     Physical Exam   Constitutional: She is oriented to person, place, and time. She appears well-developed and well-nourished. No distress.   HENT:   Head: Normocephalic.   Right Ear: External ear normal.   Left Ear: External ear normal.   Eyes: Conjunctivae and EOM are normal. Right eye exhibits no discharge. Left eye exhibits no discharge. No scleral icterus.   Neck: Normal range of motion. No JVD present. No tracheal deviation present. No thyromegaly present.   Cardiovascular: Normal rate, regular rhythm, normal heart sounds and intact distal pulses. Exam reveals no gallop and no friction rub.   No murmur heard.  Pulmonary/Chest: Effort normal and breath sounds normal. No stridor. No respiratory distress. She has no wheezes. She has no rales. She exhibits no tenderness.   Abdominal: Soft. Bowel sounds are normal. She exhibits no " distension and no mass. There is no tenderness. There is no guarding.   Musculoskeletal: Normal range of motion. She exhibits no edema, tenderness or deformity.   No plantar ulcers   Lymphadenopathy:     She has no cervical adenopathy.   Neurological: She is alert and oriented to person, place, and time.   Decreased light touch in distal lower extremities.  Absent proprioception on both big toes.   Psychiatric: She has a normal mood and affect. Her behavior is normal.     Office Visit on 08/10/2020   Component Date Value Ref Range Status   • Atopobium Vaginae 08/10/2020 Low - 0  Score Final   • BVAB 2 08/10/2020 Low - 0  Score Final   • Megasphaera 1 08/10/2020 Low - 0  Score Final    Comment: Calculate total score by adding the 3 individual bacterial  vaginosis (BV) marker scores together.  Total score is  interpreted as follows:  Total score 0-1: Indicates the absence of BV.  Total score   2: Indeterminate for BV. Additional clinical                   data should be evaluated to establish a                   diagnosis.  Total score 3-6: Indicates the presence of BV.  This test was developed and its performance characteristics  determined by [facility].  It has not been cleared or approved  by the Food and Drug Administration.  The FDA has determined  that such clearance or approval is not necessary.     • Theresa Albicans, LATOSHA 08/10/2020 Positive* Negative Final   • Theresa Glabrata, LATOSHA 08/10/2020 Negative  Negative Final     Assessment/Plan   Diagnoses and all orders for this visit:    Diabetic peripheral neuropathy associated with type 1 diabetes mellitus (CMS/HCC)  -     Comprehensive Metabolic Panel  -     Lipid Panel  -     Celiac Disease Panel  -     Vitamin B12    Uncontrolled type 1 diabetes with diabetic neuropathy (CMS/HCC)  -     Comprehensive Metabolic Panel  -     Lipid Panel  -     Celiac Disease Panel  -     Vitamin B12    Osteoporosis, postmenopausal    Insulin pump titration    Insulin pump  status    Vitamin D deficiency      Change basal rate as follows: 12 AM to 6 AM is 0.45 units/h.  6 AM to 12 AM is 0.5 units/h  Change carbohydrate ratio to as follows: 12 AM -36.   7 AM -13.   9 PM -25  Check celiac panel.  check vitamin B12  Patient will call with her vitamin D dose for further dose adjustment  Flu vaccine this fall    Copy of my note sent to Dr. Lozano    Follow-up in 4 months

## 2020-08-20 ENCOUNTER — OFFICE VISIT (OUTPATIENT)
Dept: ENDOCRINOLOGY | Age: 65
End: 2020-08-20

## 2020-08-20 VITALS
BODY MASS INDEX: 18.99 KG/M2 | WEIGHT: 128.2 LBS | DIASTOLIC BLOOD PRESSURE: 70 MMHG | OXYGEN SATURATION: 99 % | SYSTOLIC BLOOD PRESSURE: 134 MMHG | HEART RATE: 69 BPM | HEIGHT: 69 IN

## 2020-08-20 DIAGNOSIS — M81.0 OSTEOPOROSIS, POSTMENOPAUSAL: ICD-10-CM

## 2020-08-20 DIAGNOSIS — IMO0002 UNCONTROLLED TYPE 1 DIABETES WITH DIABETIC NEUROPATHY: ICD-10-CM

## 2020-08-20 DIAGNOSIS — E55.9 VITAMIN D DEFICIENCY: ICD-10-CM

## 2020-08-20 DIAGNOSIS — E10.42 DIABETIC PERIPHERAL NEUROPATHY ASSOCIATED WITH TYPE 1 DIABETES MELLITUS (HCC): Primary | ICD-10-CM

## 2020-08-20 DIAGNOSIS — Z46.81 INSULIN PUMP TITRATION: ICD-10-CM

## 2020-08-20 DIAGNOSIS — Z96.41 INSULIN PUMP STATUS: ICD-10-CM

## 2020-08-20 PROCEDURE — 95251 CONT GLUC MNTR ANALYSIS I&R: CPT | Performed by: INTERNAL MEDICINE

## 2020-08-20 PROCEDURE — 99214 OFFICE O/P EST MOD 30 MIN: CPT | Performed by: INTERNAL MEDICINE

## 2020-08-21 LAB
ALBUMIN SERPL-MCNC: 4.2 G/DL (ref 3.5–5.2)
ALBUMIN/GLOB SERPL: 1.8 G/DL
ALP SERPL-CCNC: 112 U/L (ref 39–117)
ALT SERPL-CCNC: 46 U/L (ref 1–33)
AST SERPL-CCNC: 71 U/L (ref 1–32)
BILIRUB SERPL-MCNC: 0.7 MG/DL (ref 0–1.2)
BUN SERPL-MCNC: 14 MG/DL (ref 8–23)
BUN/CREAT SERPL: 18.2 (ref 7–25)
CALCIUM SERPL-MCNC: 9 MG/DL (ref 8.6–10.5)
CHLORIDE SERPL-SCNC: 93 MMOL/L (ref 98–107)
CHOLEST SERPL-MCNC: 168 MG/DL (ref 0–200)
CO2 SERPL-SCNC: 28.5 MMOL/L (ref 22–29)
CREAT SERPL-MCNC: 0.77 MG/DL (ref 0.57–1)
ENDOMYSIUM IGA SER QL: NEGATIVE
GLOBULIN SER CALC-MCNC: 2.4 GM/DL
GLUCOSE SERPL-MCNC: 293 MG/DL (ref 65–99)
HDLC SERPL-MCNC: 101 MG/DL (ref 40–60)
IGA SERPL-MCNC: 250 MG/DL (ref 87–352)
INTERPRETATION: NORMAL
LDLC SERPL CALC-MCNC: 52 MG/DL (ref 0–100)
POTASSIUM SERPL-SCNC: 4.1 MMOL/L (ref 3.5–5.2)
PROT SERPL-MCNC: 6.6 G/DL (ref 6–8.5)
SODIUM SERPL-SCNC: 131 MMOL/L (ref 136–145)
TRIGL SERPL-MCNC: 75 MG/DL (ref 0–150)
TTG IGA SER-ACNC: <2 U/ML (ref 0–3)
VIT B12 SERPL-MCNC: 224 PG/ML (ref 211–946)
VLDLC SERPL CALC-MCNC: 15 MG/DL

## 2020-11-16 ENCOUNTER — TELEPHONE (OUTPATIENT)
Dept: OBSTETRICS AND GYNECOLOGY | Age: 65
End: 2020-11-16

## 2020-11-16 NOTE — TELEPHONE ENCOUNTER
(Tamanna pt) Pt called, had a heart attack recently and has therapy tomorrow.  She has to rescheduled her annual and doesn't want to wait.  Can you give me a reschedule date for a yearly annual that isn't in 2022?    Please advise.    [phone]

## 2020-12-09 DIAGNOSIS — IMO0002 UNCONTROLLED TYPE 1 DIABETES WITH DIABETIC NEUROPATHY: Primary | ICD-10-CM

## 2020-12-09 DIAGNOSIS — M81.0 OSTEOPOROSIS, UNSPECIFIED OSTEOPOROSIS TYPE, UNSPECIFIED PATHOLOGICAL FRACTURE PRESENCE: ICD-10-CM

## 2020-12-09 DIAGNOSIS — IMO0002 UNCONTROLLED TYPE 1 DIABETES MELLITUS WITH NEPHROPATHY: ICD-10-CM

## 2020-12-09 DIAGNOSIS — E16.1 HYPOGLYCEMIA DUE TO ENDOGENOUS HYPERINSULINEMIA: ICD-10-CM

## 2020-12-09 DIAGNOSIS — E55.9 VITAMIN D DEFICIENCY: ICD-10-CM

## 2020-12-09 DIAGNOSIS — R68.89 ABNORMAL ENDOCRINE LABORATORY TEST FINDING: ICD-10-CM

## 2020-12-11 ENCOUNTER — OFFICE VISIT (OUTPATIENT)
Dept: OBSTETRICS AND GYNECOLOGY | Age: 65
End: 2020-12-11

## 2020-12-11 VITALS
BODY MASS INDEX: 19.4 KG/M2 | SYSTOLIC BLOOD PRESSURE: 118 MMHG | DIASTOLIC BLOOD PRESSURE: 60 MMHG | HEIGHT: 69 IN | WEIGHT: 131 LBS

## 2020-12-11 DIAGNOSIS — N63.0 BREAST MASS: Primary | ICD-10-CM

## 2020-12-11 DIAGNOSIS — Z11.51 SPECIAL SCREENING EXAMINATION FOR HUMAN PAPILLOMAVIRUS (HPV): ICD-10-CM

## 2020-12-11 DIAGNOSIS — Z01.411 ENCOUNTER FOR GYNECOLOGICAL EXAMINATION WITH ABNORMAL FINDING: ICD-10-CM

## 2020-12-11 DIAGNOSIS — Z12.4 SCREENING FOR MALIGNANT NEOPLASM OF THE CERVIX: ICD-10-CM

## 2020-12-11 PROBLEM — I21.9 MYOCARDIAL INFARCT (HCC): Status: ACTIVE | Noted: 2020-12-11

## 2020-12-11 PROCEDURE — 99396 PREV VISIT EST AGE 40-64: CPT | Performed by: OBSTETRICS & GYNECOLOGY

## 2020-12-11 RX ORDER — LISINOPRIL 2.5 MG/1
TABLET ORAL
COMMUNITY
Start: 2020-11-28 | End: 2021-01-27

## 2020-12-11 RX ORDER — VALACYCLOVIR HYDROCHLORIDE 1 G/1
1000 TABLET, FILM COATED ORAL 2 TIMES DAILY
Qty: 20 TABLET | Refills: 3 | Status: SHIPPED | OUTPATIENT
Start: 2020-12-11 | End: 2020-12-11 | Stop reason: SDUPTHER

## 2020-12-11 RX ORDER — NITROGLYCERIN 0.4 MG/1
0.4 TABLET SUBLINGUAL
COMMUNITY
Start: 2020-11-03 | End: 2021-01-27

## 2020-12-11 RX ORDER — FUROSEMIDE 20 MG/1
20 TABLET ORAL
COMMUNITY
Start: 2020-11-03 | End: 2023-02-15

## 2020-12-11 RX ORDER — VALACYCLOVIR HYDROCHLORIDE 1 G/1
1000 TABLET, FILM COATED ORAL DAILY
Qty: 90 TABLET | Refills: 3 | Status: SHIPPED | OUTPATIENT
Start: 2020-12-11 | End: 2020-12-14 | Stop reason: SDUPTHER

## 2020-12-11 RX ORDER — TICAGRELOR 90 MG/1
90 TABLET ORAL 2 TIMES DAILY
COMMUNITY
Start: 2020-11-30 | End: 2021-01-27

## 2020-12-11 RX ORDER — ASPIRIN 81 MG/1
81 TABLET ORAL DAILY
COMMUNITY
Start: 2020-11-04

## 2020-12-11 RX ORDER — ATORVASTATIN CALCIUM 20 MG/1
20 TABLET, FILM COATED ORAL DAILY
COMMUNITY
Start: 2020-11-30

## 2020-12-11 RX ORDER — CARVEDILOL 3.12 MG/1
TABLET ORAL
COMMUNITY
Start: 2020-11-28

## 2020-12-11 RX ORDER — SPIRONOLACTONE 25 MG/1
TABLET ORAL
COMMUNITY
Start: 2020-11-28

## 2020-12-11 NOTE — PROGRESS NOTES
Subjective     Chief Complaint   Patient presents with   • Gynecologic Exam     AC. Last pap 09-15-19 normal. Pt had MG 20.        History of Present Illness    Opal Gutierrez is a 65 y.o.  who presents for annual exam.  The patient has had a difficult year.  She had a myocardial infarction and has a stent.  She actually has a portable defibrillator.  Patient is diabetic with an insulin pump.  She follows with endocrinology for her diabetes and for osteoporosis.  She is in her fourth year of Reclast.    From a gynecological standpoint the patient has HSV.  She does note that she had a recent outbreak.  She needs a refill of her Valtrex.    I did last year give her some Imvexxy vaginal suppositories to help with painful intercourse.  She stopped using them as and is now not sexually active which is okay with her.    Obstetric History:  OB History        0    Para   0    Term   0       0    AB   0    Living   0       SAB   0    TAB   0    Ectopic   0    Molar   0    Multiple   0    Live Births   0               Menstrual History:     No LMP recorded. Patient is postmenopausal.         Current contraception: post menopausal status  History of abnormal Pap smear: no  Received Gardasil immunization: no  Perform regular self breast exam : yes  Family history of uterine or ovarian cancer: no  Family History of colon cancer: no  Family history of breast cancer: no    Mammogram: up to date. 2020  Colonoscopy: up to date.    DEXA: up to date. Endo follows    Exercise: exercises 3 times a week  Calcium/Vitamin D: adequate intake and uses supplements    The following portions of the patient's history were reviewed and updated as appropriate: allergies, current medications, past family history, past medical history, past social history, past surgical history and problem list.    Review of Systems    Review of Systems   Constitutional: Negative for fatigue.   Respiratory: Negative for shortness  "of breath.    Gastrointestinal: Negative for abdominal pain.   Genitourinary: Negative for dysuria.   Neurological: Negative for headaches.   Psychiatric/Behavioral: Negative for dysphoric mood.     Objective   Physical Exam    /60   Ht 174 cm (68.5\")   Wt 59.4 kg (131 lb)   Breastfeeding No   BMI 19.63 kg/m²     General:   alert, appears stated age and cooperative   Neck: thyroid normal to palpation   Heart: regular rate and rhythm   Lungs: clear to auscultation bilaterally   Abdomen: soft, non-tender, without masses or organomegaly   Breast:  Fibrocystic changes bilaterally.  There are 2 masses palpated one in the left breast at 2:00 and 1 in the right breast at 10:00.  The areas are nodular mobile and tender suspect fibrocystic changes.  No skin changes or nipple discharge.  No adenopathy.   Vulva:  There are 3-4 areas where the skin is split consistent with HSV.   Vagina:  Very small introitus.  Significant vaginal atrophy.   Cervix: no cervical motion tenderness and no lesions   Uterus: non-tender, normal shape and consistency   Adnexa: no mass, fullness, tenderness   Rectal: normal rectal, no masses     Assessment/Plan   Diagnoses and all orders for this visit:    1. Encounter for gynecological examination without abnormal finding (Primary)  -     IGP, Aptima HPV, Rfx 16 / 18,45    2. Breast mass  -     Mammo Diagnostic Right With CAD; Future  -     Mammo Diagnostic Left With CAD; Future  -     US Breast Bilateral Complete; Future  -     Ambulatory Referral to Breast Surgery    3. Screening for malignant neoplasm of the cervix  -     IGP, Aptima HPV, Rfx 16 / 18,45    4. Special screening examination for human papillomavirus (HPV)  -     IGP, Aptima HPV, Rfx 16 / 18,45    Other orders  -     Discontinue: valACYclovir (Valtrex) 1000 MG tablet; Take 1 tablet by mouth 2 (Two) Times a Day.  Dispense: 20 tablet; Refill: 3  -     valACYclovir (Valtrex) 1000 MG tablet; Take 1 tablet by mouth Daily.  " Dispense: 90 tablet; Refill: 3      Will refer the patient for bilateral mammogram and ultrasound.  Patient has a history of breast densities and calcifications.    Patient will take Valtrex for HSV twice a day for 5 days and then go on once a day suppression.    Follow-up with endocrinology for osteoporosis.  Patient has had hip surgery x2.  All questions answered.  Breast self exam technique reviewed and patient encouraged to perform self-exam monthly.  Discussed healthy lifestyle modifications.  Recommended 30 minutes of aerobic exercise five times per week.  Discussed calcium needs to prevent osteoporosis.

## 2020-12-14 ENCOUNTER — RESULTS ENCOUNTER (OUTPATIENT)
Dept: ENDOCRINOLOGY | Age: 65
End: 2020-12-14

## 2020-12-14 ENCOUNTER — LAB (OUTPATIENT)
Dept: ENDOCRINOLOGY | Age: 65
End: 2020-12-14

## 2020-12-14 DIAGNOSIS — E16.1 HYPOGLYCEMIA DUE TO ENDOGENOUS HYPERINSULINEMIA: ICD-10-CM

## 2020-12-14 DIAGNOSIS — R68.89 ABNORMAL ENDOCRINE LABORATORY TEST FINDING: ICD-10-CM

## 2020-12-14 DIAGNOSIS — E55.9 VITAMIN D DEFICIENCY: ICD-10-CM

## 2020-12-14 DIAGNOSIS — IMO0002 UNCONTROLLED TYPE 1 DIABETES WITH DIABETIC NEUROPATHY: ICD-10-CM

## 2020-12-14 DIAGNOSIS — M81.0 OSTEOPOROSIS, UNSPECIFIED OSTEOPOROSIS TYPE, UNSPECIFIED PATHOLOGICAL FRACTURE PRESENCE: ICD-10-CM

## 2020-12-14 DIAGNOSIS — IMO0002 UNCONTROLLED TYPE 1 DIABETES MELLITUS WITH NEPHROPATHY: ICD-10-CM

## 2020-12-14 RX ORDER — VALACYCLOVIR HYDROCHLORIDE 1 G/1
1000 TABLET, FILM COATED ORAL DAILY
Qty: 90 TABLET | Refills: 3 | Status: SHIPPED | OUTPATIENT
Start: 2020-12-14 | End: 2021-04-16

## 2020-12-15 LAB
CYTOLOGIST CVX/VAG CYTO: NORMAL
CYTOLOGY CVX/VAG DOC CYTO: NORMAL
CYTOLOGY CVX/VAG DOC THIN PREP: NORMAL
DX ICD CODE: NORMAL
HIV 1 & 2 AB SER-IMP: NORMAL
HPV I/H RISK 4 DNA CVX QL PROBE+SIG AMP: NEGATIVE
OTHER STN SPEC: NORMAL
PTH-INTACT SERPL-MCNC: 60 PG/ML (ref 15–65)
STAT OF ADQ CVX/VAG CYTO-IMP: NORMAL
TSH SERPL DL<=0.005 MIU/L-ACNC: 1.11 UIU/ML (ref 0.27–4.2)

## 2020-12-16 ENCOUNTER — TELEPHONE (OUTPATIENT)
Dept: OBSTETRICS AND GYNECOLOGY | Age: 65
End: 2020-12-16

## 2020-12-16 NOTE — TELEPHONE ENCOUNTER
----- Message from Lencho Nolen MD sent at 12/15/2020  4:59 PM EST -----  Please notify pap is normal.

## 2021-01-26 NOTE — PROGRESS NOTES
"Chief Complaint  Chief Complaint   Patient presents with   • Diabetes       FOLLOW UP/ TYPE 1  DM WITH INSULIN PUMP  Subjective          Opal Gutierrez presents to [facility] END[facility] for   64 yo F with PMH of DM1, CAD s/p stent placement, and diabetic neuropathy who presents for follow up.    Diabetes  Patient notes that she had a MI in 2020 and she had to have stents and a life vest. Notes having an episode of chest pain. She presented to her primary care doctor and was immediately sent to the ER. Her cardiologist is a member of the [facility] system. She notes that she was found to have gastric ulcers in 2020, shortly after her stents were placed. Last documented hgb A1c was 8.7. Her medtronic 670g insulin pump was downloaded today. She uses novolog in her pump. BG  66% of the time, 180-250 29% of the time, 250-400% 4% of the time. Pt is in auto mode 97% of the time. GMI 7.3. Ave BG via sensor 165. Variation 25%. Patient appears to have postmeal hyperglycemia.    Osteoporosis  Last DEXA documented from 2017. Patient has an order placed for reclast in 06/2021.Last dose of reclast was 06/2020. Denies falls or fractures.    Abnormal Thyroid Function Tests  Patient is not sure, but thinks she may have been diagnosed with atrial fibrillation. TSH was document as being low (0.29) while in the hospital, but later returned to normal during the same hospitalization.       Objective   Vital Signs:   /64   Pulse 67   Ht 175.3 cm (69\")   Wt 58.4 kg (128 lb 12.8 oz)   SpO2 96%   BMI 19.02 kg/m²     Physical Exam  Vitals signs reviewed.   Constitutional:       Appearance: Normal appearance. She is normal weight.      Comments: Thin   HENT:      Nose:      Comments: Mask in place     Mouth/Throat:      Mouth: Mucous membranes are moist.   Eyes:      Extraocular Movements: Extraocular movements intact.      Conjunctiva/sclera: Conjunctivae normal.      Pupils: Pupils are equal, " round, and reactive to light.   Cardiovascular:      Rate and Rhythm: Normal rate and regular rhythm.      Pulses: Normal pulses.      Heart sounds: Normal heart sounds.      Comments: Heart monitor inplace in left upper quadrant of the chest  Abdominal:      General: Abdomen is flat. Bowel sounds are normal.      Palpations: Abdomen is soft.   Musculoskeletal:         General: No swelling.   Skin:     General: Skin is warm and dry.   Neurological:      General: No focal deficit present.      Mental Status: She is alert and oriented to person, place, and time.      Cranial Nerves: Cranial nerves are intact.      Sensory: Sensation is intact.      Deep Tendon Reflexes: Reflexes are normal and symmetric.   Psychiatric:         Mood and Affect: Mood normal.         Behavior: Behavior normal.        Result Review :   The following data was reviewed by: Aidan Levy MD on 01/27/2021:  CMP    CMP 8/6/20 8/20/20 1/5/21   Glucose 380 (A) 293 (A)    BUN 11 14    Creatinine 0.75 0.77    eGFR Non  Am 85 76    eGFR African Am 98 92    Sodium 133 (A) 131 (A)    Potassium 4.0 4.1 3.8   Chloride 96 93 (A)    Calcium 9.1 9.0    Total Protein 7.1 6.6    Albumin 4.1 4.20    Globulin 3.0 2.4    Total Bilirubin 0.6 0.7    Alkaline Phosphatase 117 112    AST (SGOT) 70 (A) 71 (A)    ALT (SGPT) 47 (A) 46 (A)    (A) Abnormal value            CBC w/diff    CBC w/Diff 1/6/21 1/6/21 1/7/21 1/8/21    0310 1404     WBC 9.48  10.67 7.03   RBC 2.64 (A)  3.10 (A) 2.97 (A)   Hemoglobin 7.9 (A) 8.9 (A) 9.2 (A) 9.1 (A)   Hematocrit 24.4 (A) 27.9 (A) 28.3 (A) 26.6 (A)   MCV 92.4  91.3 89.6   MCH 29.9  29.7 30.6   MCHC 32.4  32.5 34.2   RDW 16.6  17.1 (A) 17.2 (A)   Platelets 76 (A)  92 (A) 116 (A)   Neutrophil Rel % 81.9 (A)  67.8 57.6   Immature Granulocyte Rel % 0.4  0.6 2.7 (A)   Lymphocyte Rel % 11.3 (A)  18.7 18.9   Monocyte Rel % 5.7  12.0 19.5 (A)   Eosinophil Rel % 0.6  0.7 1.0   Basophil Rel % 0.1  0.2 0.3   (A) Abnormal value             Lipid Panel    Lipid Panel 8/20/20   Total Cholesterol 168   Triglycerides 75   HDL Cholesterol 101 (A)   VLDL Cholesterol 15   LDL Cholesterol  52   (A) Abnormal value            TSH    TSH 1/3/21 1/4/21 1/5/21   TSH 0.575 0.290 (A) 1.480   (A) Abnormal value       Comments are available for some flowsheets but are not being displayed.           Electrolytes    Electrolytes 8/6/20 8/20/20 1/5/21   Sodium 133 (A) 131 (A)    Potassium 4.0 4.1 3.8   Chloride 96 93 (A)    Calcium 9.1 9.0    (A) Abnormal value            Renal Profile    Renal Profile 6/18/20 8/6/20 8/20/20   BUN 13 11 14   Creatinine 0.72 0.75 0.77   eGFR Non  Am 82 85 76   eGFR  Am  98 92           A1C Last 3 Results    HGBA1C Last 3 Results 8/6/20 10/31/20 1/3/21 1/3/21      0434 0627   Hemoglobin A1C 9.6 (A) 6.7 (A) 9.4 (A) 8.7 (A)   (A) Abnormal value       Comments are available for some flowsheets but are not being displayed.           Microalbumin    Microalbumin 8/6/20   Microalbumin, Urine <3.0      Comments are available for some flowsheets but are not being displayed.                   Data reviewed: Labs and chart reviewed          Assessment and Plan    Problem List Items Addressed This Visit        Other    Uncontrolled type 1 diabetes with diabetic neuropathy (CMS/[facility])    Current Assessment & Plan     Goal Hgb A1c <7  Goal fasting BG <130 mg/dl  Goal BG prior to meal <180  Most recent hgb A1c was   Hemoglobin A1C   Date Value Ref Range Status   01/03/2021 8.7 (H) 4.3 - 5.6 % Final     Comment:     (note)  A1C% Reference Range:  4.3 - 5.6  Normal range  5.7 - 6.4  Pre-diabetic -increased risk for developing diabetes mellitus.  >=6.5      Diabetic -diagnostic of diabetes mellitus.  Note: For diagnosis of diabetes in individuals without unequivocal   hyperglycemia, results should be confirmed by repeat testing.    Patients with conditions that shorten erythrocyte survival, such as   recovery from acute blood loss,  hemolytic anemia, kidney disease,   or the presence of unstable hemogloblins like HbSS, HbCC, and HbSC   may yield falsely decreased HbA1c test results. Iron deficiency may   yield falsely increased HbA1c test results.      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 (CMS/[facility])    Relevant Medications    insulin aspart (NovoLOG) 100 UNIT/ML injection    Osteoporosis, postmenopausal - Primary    Current Assessment & Plan     Treated with Reclast  DEXA due in 06/2021 and order placed         Relevant Orders    DEXA Bone Density Axial    Abnormal thyroid function test    Current Assessment & Plan     Patient on amiodarone for cardiac arrythmia  This medication can cause abnormal thyroid function tests similar to what was documented during her hospitalization  Will check TFTs and thyroid antibodies             I spent 30 minutes caring for Opal on this date of service. This time includes time spent by me in the following activities:preparing for the visit, reviewing tests, obtaining and/or reviewing a separately obtained history, performing a medically appropriate examination and/or evaluation , counseling and educating the patient/family/caregiver and ordering medications, tests, or procedures  Follow Up   Return in about 3 months (around 4/27/2021).  Patient was given instructions and counseling regarding her condition or for health maintenance advice. Please see specific information pulled into the AVS if appropriate.

## 2021-01-27 ENCOUNTER — OFFICE VISIT (OUTPATIENT)
Dept: ENDOCRINOLOGY | Age: 66
End: 2021-01-27

## 2021-01-27 VITALS
WEIGHT: 128.8 LBS | DIASTOLIC BLOOD PRESSURE: 64 MMHG | HEART RATE: 67 BPM | SYSTOLIC BLOOD PRESSURE: 106 MMHG | HEIGHT: 69 IN | BODY MASS INDEX: 19.08 KG/M2 | OXYGEN SATURATION: 96 %

## 2021-01-27 DIAGNOSIS — R94.6 ABNORMAL THYROID FUNCTION TEST: ICD-10-CM

## 2021-01-27 DIAGNOSIS — IMO0002 UNCONTROLLED TYPE 1 DIABETES MELLITUS WITH NEPHROPATHY: ICD-10-CM

## 2021-01-27 DIAGNOSIS — M81.0 OSTEOPOROSIS, POSTMENOPAUSAL: Primary | ICD-10-CM

## 2021-01-27 DIAGNOSIS — IMO0002 UNCONTROLLED TYPE 1 DIABETES WITH DIABETIC NEUROPATHY: ICD-10-CM

## 2021-01-27 PROCEDURE — 99214 OFFICE O/P EST MOD 30 MIN: CPT | Performed by: INTERNAL MEDICINE

## 2021-01-27 RX ORDER — AMIODARONE HYDROCHLORIDE 200 MG/1
200 TABLET ORAL DAILY
COMMUNITY
Start: 2021-01-10 | End: 2022-08-02

## 2021-01-27 RX ORDER — PANTOPRAZOLE SODIUM 40 MG/1
TABLET, DELAYED RELEASE ORAL
COMMUNITY
Start: 2021-01-09 | End: 2021-04-09

## 2021-01-27 RX ORDER — CLOPIDOGREL BISULFATE 75 MG/1
75 TABLET ORAL DAILY
COMMUNITY
Start: 2021-01-10

## 2021-01-28 NOTE — ASSESSMENT & PLAN NOTE
Goal Hgb A1c <7  Goal fasting BG <130 mg/dl  Goal BG prior to meal <180  Most recent hgb A1c was   Hemoglobin A1C   Date Value Ref Range Status   01/03/2021 8.7 (H) 4.3 - 5.6 % Final     Comment:     (note)  A1C% Reference Range:  4.3 - 5.6  Normal range  5.7 - 6.4  Pre-diabetic -increased risk for developing diabetes mellitus.  >=6.5      Diabetic -diagnostic of diabetes mellitus.  Note: For diagnosis of diabetes in individuals without unequivocal   hyperglycemia, results should be confirmed by repeat testing.    Patients with conditions that shorten erythrocyte survival, such as   recovery from acute blood loss, hemolytic anemia, kidney disease,   or the presence of unstable hemogloblins like HbSS, HbCC, and HbSC   may yield falsely decreased HbA1c test results. Iron deficiency may   yield falsely increased HbA1c test results.      Hgb A1c is above  With cardiac hx, it is important to maintain a Hgb A1c <7  Will increase carb ratio at 7AM to 12 and 9PM to 23 to try to eliminate post meal hyperglycemia    Continue basal setting at:  MN-5AM: 0.45  5AM-MN: 0.5

## 2021-01-28 NOTE — ASSESSMENT & PLAN NOTE
Patient on amiodarone for cardiac arrythmia  This medication can cause abnormal thyroid function tests similar to what was documented during her hospitalization  Will check TFTs and thyroid antibodies

## 2021-02-06 LAB
T3FREE SERPL-MCNC: 2.3 PG/ML (ref 2–4.4)
T4 FREE SERPL-MCNC: 1.48 NG/DL (ref 0.93–1.7)
THYROGLOB SERPL-MCNC: 68 NG/ML
THYROPEROXIDASE AB SERPL-ACNC: <9 IU/ML (ref 0–34)
TSH SERPL DL<=0.005 MIU/L-ACNC: 2.01 UIU/ML (ref 0.27–4.2)
TSI SER-ACNC: <0.1 IU/L (ref 0–0.55)

## 2021-02-09 RX ORDER — BLOOD SUGAR DIAGNOSTIC
STRIP MISCELLANEOUS
Qty: 400 EACH | Refills: 1 | Status: SHIPPED | OUTPATIENT
Start: 2021-02-09

## 2021-02-09 RX ORDER — LANCETS
EACH MISCELLANEOUS
Qty: 408 EACH | Refills: 1 | Status: SHIPPED | OUTPATIENT
Start: 2021-02-09

## 2021-03-09 ENCOUNTER — APPOINTMENT (OUTPATIENT)
Dept: WOMENS IMAGING | Facility: HOSPITAL | Age: 66
End: 2021-03-09

## 2021-03-09 PROCEDURE — 76641 ULTRASOUND BREAST COMPLETE: CPT | Performed by: RADIOLOGY

## 2021-03-09 PROCEDURE — 77066 DX MAMMO INCL CAD BI: CPT | Performed by: RADIOLOGY

## 2021-03-09 PROCEDURE — 77062 BREAST TOMOSYNTHESIS BI: CPT | Performed by: RADIOLOGY

## 2021-03-09 PROCEDURE — G0279 TOMOSYNTHESIS, MAMMO: HCPCS | Performed by: RADIOLOGY

## 2021-03-12 DIAGNOSIS — N63.0 BREAST MASS IN FEMALE: Primary | ICD-10-CM

## 2021-03-19 ENCOUNTER — BULK ORDERING (OUTPATIENT)
Dept: CASE MANAGEMENT | Facility: OTHER | Age: 66
End: 2021-03-19

## 2021-03-19 DIAGNOSIS — Z23 IMMUNIZATION DUE: ICD-10-CM

## 2021-03-25 DIAGNOSIS — IMO0002 UNCONTROLLED TYPE 1 DIABETES WITH DIABETIC NEUROPATHY: Primary | ICD-10-CM

## 2021-03-25 DIAGNOSIS — R68.89 ABNORMAL ENDOCRINE LABORATORY TEST FINDING: ICD-10-CM

## 2021-03-26 ENCOUNTER — OFFICE VISIT (OUTPATIENT)
Dept: MAMMOGRAPHY | Facility: CLINIC | Age: 66
End: 2021-03-26

## 2021-03-26 VITALS
WEIGHT: 131 LBS | BODY MASS INDEX: 19.4 KG/M2 | DIASTOLIC BLOOD PRESSURE: 70 MMHG | HEIGHT: 69 IN | SYSTOLIC BLOOD PRESSURE: 120 MMHG

## 2021-03-26 DIAGNOSIS — N63.20 MASSES OF BOTH BREASTS: Primary | ICD-10-CM

## 2021-03-26 DIAGNOSIS — N63.10 MASSES OF BOTH BREASTS: Primary | ICD-10-CM

## 2021-03-26 PROCEDURE — 99204 OFFICE O/P NEW MOD 45 MIN: CPT | Performed by: SURGERY

## 2021-03-26 RX ORDER — METOLAZONE 2.5 MG/1
2.5 TABLET ORAL DAILY
COMMUNITY
End: 2022-08-02

## 2021-03-26 RX ORDER — POTASSIUM CHLORIDE 750 MG/1
10 TABLET, FILM COATED, EXTENDED RELEASE ORAL DAILY
COMMUNITY
End: 2022-08-02

## 2021-03-26 NOTE — PROGRESS NOTES
Chief Complaint: Opal uGtierrez is a 65 y.o.. female here today for Breast Mass (Bilateral)        History of Present Illness:  Patient presents with breast mass. Bilateral  She is a very nice 65-year-old diabetic white female who was examined by Dr. Nolen earlier in the year it was felt to have some nodularity in the upper outer quadrant of both breasts.  Imaging studies were obtained several weeks ago and she was noted to have very dense breast tissue.  There were no suspicious mammogram findings.  There were multiple popcorn-like calcifications in the upper outer quadrant of both breasts.  She also had an ultrasound and there was no specific abnormality seen.  I have personally reviewed both of those imaging studies.  The breast tissue is extremely dense with lots of calcifications but none of them appear to be in a suspicious pattern.  The ultrasound was also free of any worrisome masses.    The patient's family history is negative for breast or ovarian cancer.  She is not taking any hormone replacement therapy.    Review of Systems:  Review of Systems   Musculoskeletal: Positive for gait problem and neck pain.   Skin:        The patient denies any noticeable changes to the skin of the breast.    Neurological: Positive for gait problem and numbness.   Hematological: Bruises/bleeds easily.   All other systems reviewed and are negative.     I have reviewed the ROS as documented by the MA/LPN/RN Parviz Keating MD      Past Medical and Surgical History:  Breast Biopsy History:  Patient has not had a breast biopsy in the past.  Breast Cancer HIstory:  Patient does not have a past medical history of breast cancer.  Breast Operations, and year:  None  Social History     Tobacco Use   Smoking Status Never Smoker   Smokeless Tobacco Never Used     Obstetric History:  Patient is postmenopausal, entered menopause naturally at age: 52   Number of pregnancies:None  Length of time taking birth control pills:5  years  Patient has never taken hormone replacement    Past Surgical History:   Procedure Laterality Date   • CAROTID STENT  10/2020   • COLONOSCOPY  2019    normal.   • HAND SURGERY      for fracture   • HIP SURGERY Left    • TOTAL HIP ARTHROPLASTY Right        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   • Heart attack (CMS/HCC) 10/2020   • Hip fracture (CMS/HCC)     right- 2012, left 2013   • Osteoporosis     managed by gynecologist.  On Reclast yearly       Prior Hospitalizations, other than for surgery or childbirth, and year:  None    Social History:  Patient is .  Patient has no children.    Family History:  Family History   Problem Relation Age of Onset   • Cancer Father         prostate   • Diabetes Father    • Heart disease Father    • Alzheimer's disease Mother         age 90   • Colon cancer Maternal Grandmother 80   • Heart disease Brother        Vital Signs:  Vitals:    03/26/21 0946   BP: 120/70       Medications:    Current Outpatient Prescriptions:     Current Outpatient Medications:   •  Accu-Chek FastClix Lancets misc, Dx code E10.65 Testing bs 4 times daily, Disp: 408 each, Rfl: 1  •  Accu-Chek Guide test strip, Dx code E10.65 testing bs 4 x day, Disp: 400 each, Rfl: 1  •  amiodarone (PACERONE) 200 MG tablet, Take 200 mg by mouth Daily., Disp: , Rfl:   •  aspirin (aspirin) 81 MG EC tablet, Take 81 mg by mouth Daily., Disp: , Rfl:   •  atorvastatin (LIPITOR) 20 MG tablet, Take 20 mg by mouth Daily., Disp: , Rfl:   •  carvedilol (COREG) 3.125 MG tablet, TK 1 T PO BID WITH MEALS, Disp: , Rfl:   •  Cholecalciferol (VITAMIN D3) 5000 units capsule capsule, Take 5,000 Units by mouth Daily., Disp: , Rfl:   •  clopidogrel (PLAVIX) 75 MG tablet, Take 75 mg by mouth Daily., Disp: , Rfl:   •  CREON 78076-26643 units capsule delayed-release particles capsule, TK 2 CS PO TID, Disp: , Rfl: 3  •  FLUoxetine  (PROzac) 20 MG capsule, Take 1 capsule by mouth Daily., Disp: , Rfl:   •  furosemide (LASIX) 20 MG tablet, Take 20 mg by mouth., Disp: , Rfl:   •  glucagon (GLUCAGON EMERGENCY) 1 MG injection, Inject 1 mg into the shoulder, thigh, or buttocks See Admin Instructions., Disp: 2 kit, Rfl: 1  •  Insulin Infusion Pump device, Inject 1 each under the skin into the appropriate area as directed., Disp: , Rfl:   •  insulin lispro (humaLOG) 100 UNIT/ML injection, Max 25 units daily via insulin pump, Disp: 30 mL, Rfl: 1  •  metOLazone (ZAROXOLYN) 2.5 MG tablet, Take 2.5 mg by mouth Daily., Disp: , Rfl:   •  pantoprazole (PROTONIX) 40 MG EC tablet, Take  by mouth., Disp: , Rfl:   •  potassium chloride 10 MEQ CR tablet, Take 10 mEq by mouth Daily., Disp: , Rfl:   •  spironolactone (ALDACTONE) 25 MG tablet, TK 1/2 T PO D, Disp: , Rfl:   •  traZODone (DESYREL) 150 MG tablet, TK 1/2 T PO QHS, Disp: , Rfl: 0  •  valACYclovir (Valtrex) 1000 MG tablet, Take 1 tablet by mouth Daily., Disp: 90 tablet, Rfl: 3    Current Facility-Administered Medications:   •  zoledronic acid (RECLAST) infusion 5 mg, 5 mg, Intravenous, Once, Grayson Levi MD    Physical Examination:  General Appearance:   Patient is in no distress.  She is well kept and has an average build.   Psychiatric:  Patient with appropriate mood and affect. Alert and oriented to self, time, and place.    Breast, RIGHT:  small sized, symmetric with the contralateral side.  Breast skin is without erythema, edema, rashes.  There are no visible abnormalities upon inspection during the arm-raising maneuver or with hands on hips in the sitting position. There is no nipple retraction, discharge or nipple/areolar skin changes.There is very dense breast tissue throughout.  In the upper outer and lower outer quadrants I can feel multiple smooth rounded nodules which are very mobile.    Breast, LEFT:  small sized, symmetric with the contralateral side.  Breast skin is without  erythema, edema, rashes.  There are no visible abnormalities upon inspection during the arm-raising maneuver or with hands on hips in the sitting position. There is no nipple retraction, discharge or nipple/areolar skin changes.There are no masses palpable in the sitting or supine positions.  The breast tissue is very dense and asymmetric pattern.  In the upper outer and lower outer quadrants I can feel multiple smooth nodules that are quite mobile.    Lymphatic:  There is no axillary, cervical, infraclavicular, or supraclavicular adenopathy bilaterally.  Eyes:  Pupils are round and reactive to light.  Cardiovascular:  Heart rate and rhythm are regular.  Respiratory:  Lungs are clear bilaterally with no crackles or wheezes in any lung field.  Gastrointestinal:  Abdomen is soft, nondistended, and nontender.  There was no obvious hepatosplenomegaly or abdominal mass.  There are no scars from previous surgery.  She does have an insulin pump in place.    Musculoskeletal:  Good strength in all 4 extremities.   There is good range of motion in both shoulders.    Skin:  No new skin lesions or rashes on the skin excluding the breast (see breast exam above).    Assessment:  1. Masses of both breasts          Plan:  The patient is rather difficult to examine because of the significant nodularity but I do not feel anything that is terribly concerning at this point in time.  I think it would be good to increase the frequency of her physical examinations to stay on top of things.  I will begin alternating 6-month visits with Dr. Nolen.  To get on schedule for that, I will see her back in the office in August.  If she feels anything of concern she will certainly call me.      CPT coding:    Next Appointment:  No follow-ups on file.            EMR Dragon/transcription disclaimer:    Much of this encounter note is an electronic transcription/translocation of spoken language to printed text.  The electronic translation of spoken  language may permit erroneous, or at times, nonsensical words or phrases to be inadvertently transcribed.  Although I have reviewed the note from such areas, some may still exist.

## 2021-04-06 NOTE — PROGRESS NOTES
Subjective   Opal Gutierrez is a 66 y.o. female.     F/u from dr Levy for dm 1, osteoporosis, abn TFT/ testing bs 3 x day / last dm eye exam 5/18/20 with dr Overton  / last dm foot exam today with dr Devi             Patient is a 65-year-old female who came in for follow-up.      She has known diabetes mellitus since her 20s and was started on insulin a few years later.  She has been using an insulin pump since 2014.  She is on Medtronic 670 G insulin pump with a continuous glucose sensor.  She is on auto mode 61% of the time.  Hemoglobin A1c done in April 2021 is 8.5%.     Pump settings:  Basal rate: 12 AM is 0.45.  5 AM is 0.5.     Carbohydrate ratio: 12 midnight is 36.  7 AM is 10.  9 PM is 23.      Sensitivity: 120.     Target: 100-150      Patient is in auto mode 95% of the time.  Average glucose 163 mg per DL.  Time in target 68%.  Time above target 31%.  Time below target 1%.  She has postprandial hyperglycemia mostly after lunch and after supper.  She grazes through the day     Her last eye examination was May 2020 and she has no retinopathy.  Urine microalbumin is normal in April 2021.  She has numbness in both feet and has difficulty with balance when she walks.     She was started on Lipitor 20 mg/day after she had a heart attack in October 2021.  She denies myalgia.  Lipid panel done in April 2021 are as follows: Cholesterol 98.  HDL 55.  LDL 31.  Triglycerides 47.     She has a history of pancreatitis around 1995 thought to be due to alcohol use.  She has been having diarrhea is on Creon.  Diarrhea has resolved.      She had bleeding gastric ulcer and is on Protonix prescribed by Dr. Diggs.  She is on Plavix and aspirin after recent angioplasty with stent.  She denies melena, hematochezia or hematemesis     She has osteoporosis which is being managed by her gynecologist Dr. Nolen.  She has been receiving Reclast yearly.  She is on vit D 5000 u/day.    She has coronary artery disease and had  "angioplasty with stent to the LAD done by Dr. Bruner in October 2020.  She is on amiodarone 200 mg/day, Coreg 3.125 mg daily, furosemide 20 mg as needed, Plavix 75 mg daily, aspirin 81 mg daily and spironolactone 25 mg 1/2 tablet daily.  She denies chest pain or shortness of breath.         The following portions of the patient's history were reviewed and updated as appropriate: allergies, current medications, past family history, past medical history, past social history, past surgical history and problem list.    Review of Systems   Eyes: Negative for visual disturbance.   Respiratory: Negative for shortness of breath.    Cardiovascular: Negative for chest pain, palpitations and leg swelling.   Gastrointestinal: Negative.  Negative for anal bleeding and blood in stool.   Genitourinary: Negative.  Negative for hematuria.   Musculoskeletal: Negative for myalgias.   Neurological: Positive for numbness.     Objective      Vitals:    04/16/21 0830   BP: 114/48   BP Location: Right arm   Patient Position: Sitting   Cuff Size: Small Adult   Pulse: 69   SpO2: 98%   Weight: 61 kg (134 lb 6.4 oz)   Height: 175.3 cm (69.02\")     Physical Exam  Constitutional:       General: She is not in acute distress.     Appearance: Normal appearance. She is not ill-appearing or toxic-appearing.   Eyes:      General: No scleral icterus.        Right eye: No discharge.         Left eye: No discharge.      Conjunctiva/sclera: Conjunctivae normal.   Neck:      Vascular: No carotid bruit.   Cardiovascular:      Rate and Rhythm: Normal rate and regular rhythm.      Heart sounds: Murmur (Systolic murmur at the apex) heard.   No gallop.    Pulmonary:      Effort: No respiratory distress.      Breath sounds: Normal breath sounds. No wheezing or rales.   Abdominal:      General: Bowel sounds are normal. There is no distension.      Palpations: Abdomen is soft.      Tenderness: There is no right CVA tenderness or left CVA tenderness. "   Musculoskeletal:         General: Normal range of motion.      Cervical back: No rigidity or tenderness.      Comments: No plantar ulcers   Lymphadenopathy:      Cervical: No cervical adenopathy.   Skin:     General: Skin is warm and dry.   Neurological:      Mental Status: She is alert and oriented to person, place, and time.      Motor: No weakness.      Comments: Decreased light touch in distal lower extremities   Psychiatric:         Mood and Affect: Mood normal.         Behavior: Behavior normal.       Results Encounter on 03/30/2021   Component Date Value Ref Range Status   • Total Cholesterol 04/02/2021 98  0 - 200 mg/dL Final    Comment: Cholesterol Reference Ranges  (U.S. Department of Health and Human Services ATP III  Classifications)  Desirable          <200 mg/dL  Borderline High    200-239 mg/dL  High Risk          >240 mg/dL  Triglyceride Reference Ranges  (U.S. Department of Health and Human Services ATP III  Classifications)  Normal           <150 mg/dL  Borderline High  150-199 mg/dL  High             200-499 mg/dL  Very High        >500 mg/dL  HDL Reference Ranges  (U.S. Department of Health and Human Services ATP III  Classifcations)  Low     <40 mg/dl (major risk factor for CHD)  High    >60 mg/dl ('negative' risk factor for CHD)  LDL Reference Ranges  (U.S. Department of Health and Human Services ATP III  Classifcations)  Optimal          <100 mg/dL  Near Optimal     100-129 mg/dL  Borderline High  130-159 mg/dL  High             160-189 mg/dL  Very High        >189 mg/dL     • Triglycerides 04/02/2021 47  0 - 150 mg/dL Final   • HDL Cholesterol 04/02/2021 55  40 - 60 mg/dL Final   • VLDL Cholesterol [place] 04/02/2021 12  5 - 40 mg/dL Final   • LDL Chol Calc ([facility]) 04/02/2021 31  0 - 100 mg/dL Final   • Hemoglobin A1C 04/02/2021 8.50* 4.80 - 5.60 % Final    Comment: Hemoglobin A1C Ranges:  Increased Risk for Diabetes  5.7% to 6.4%  Diabetes                     >= 6.5%  Diabetic Goal                 < 7.0%     • GGT 04/02/2021 24  5 - 36 U/L Final   • Glucose 04/02/2021 267* 65 - 99 mg/dL Final   • BUN 04/02/2021 8  8 - 23 mg/dL Final   • Creatinine 04/02/2021 0.95  0.57 - 1.00 mg/dL Final   • eGFR Non African Am 04/02/2021 59* >60 mL/min/1.73 Final    Comment: GFR Normal >60  Chronic Kidney Disease <60  Kidney Failure <15     • eGFR  Am 04/02/2021 72  >60 mL/min/1.73 Final   • BUN/Creatinine Ratio 04/02/2021 8.4  7.0 - 25.0 Final   • Sodium 04/02/2021 134* 136 - 145 mmol/L Final   • Potassium 04/02/2021 5.1  3.5 - 5.2 mmol/L Final    Comment: Slight hemolysis detected by analyzer. Results may be  affected.     • Chloride 04/02/2021 95* 98 - 107 mmol/L Final   • Total CO2 04/02/2021 30.1* 22.0 - 29.0 mmol/L Final   • Calcium 04/02/2021 8.9  8.6 - 10.5 mg/dL Final   • Total Protein 04/02/2021 6.9  6.0 - 8.5 g/dL Final   • Albumin 04/02/2021 3.90  3.50 - 5.20 g/dL Final   • Globulin 04/02/2021 3.0  gm/dL Final   • A/G Ratio 04/02/2021 1.3  g/dL Final   • Total Bilirubin 04/02/2021 0.4  0.0 - 1.2 mg/dL Final   • Alkaline Phosphatase 04/02/2021 86  39 - 117 U/L Final   • AST (SGOT) 04/02/2021 33* 1 - 32 U/L Final   • ALT (SGPT) 04/02/2021 25  1 - 33 U/L Final   • Creatinine, Urine 04/02/2021 10.3  Not Estab. mg/dL Final   • Microalbumin, Urine 04/02/2021 <3.0  Not Estab. ug/mL Final   • Microalbumin/Creatinine Ratio 04/02/2021 <29  0 - 29 mg/g creat Final    Comment:                        Normal:                0 -  29                         Moderately increased: 30 - 300                         Severely increased:       >300     • Interpretation 04/02/2021 Note   Final    Supplemental report is available.   • PDF Image 04/02/2021 Not applicable   Final     Assessment/Plan   Diagnoses and all orders for this visit:    1. 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 Uncontrolled type 1 diabetes with diabetic neuropathy (CMS/HCC)    3. Diabetic peripheral neuropathy  associated with type 1 diabetes mellitus (CMS/[facility])    4. Osteoporosis, postmenopausal    5. Insulin pump status    6. Coronary artery disease involving native coronary artery of native heart without angina pectoris    7. History of coronary angioplasty with insertion of stent    8. Insulin pump titration      Change carbohydrate ratio from 7 AM to 9 PM to 9  Reduce snacking.  Continue Lipitor 20 mg/day.  Continue Coreg, spironolactone, metolazone, Lasix, aspirin, and Plavix per cardiologist.  Continue Protonix 40 mg/day  Continue Creon.  Will defer treatment of osteoporosis to gynecologist.    Copy of my note sent to Dr. Lozano, Dr. Bruner, Dr. Diggs and Dr. Nolen    Follow-up in 4 months    Total time 54 minutes

## 2021-04-16 ENCOUNTER — OFFICE VISIT (OUTPATIENT)
Dept: ENDOCRINOLOGY | Age: 66
End: 2021-04-16

## 2021-04-16 VITALS
BODY MASS INDEX: 19.91 KG/M2 | SYSTOLIC BLOOD PRESSURE: 114 MMHG | DIASTOLIC BLOOD PRESSURE: 48 MMHG | HEART RATE: 69 BPM | HEIGHT: 69 IN | WEIGHT: 134.4 LBS | OXYGEN SATURATION: 98 %

## 2021-04-16 DIAGNOSIS — M81.0 OSTEOPOROSIS, POSTMENOPAUSAL: ICD-10-CM

## 2021-04-16 DIAGNOSIS — IMO0002 UNCONTROLLED TYPE 1 DIABETES MELLITUS WITH NEPHROPATHY: Primary | ICD-10-CM

## 2021-04-16 DIAGNOSIS — Z46.81 INSULIN PUMP TITRATION: ICD-10-CM

## 2021-04-16 DIAGNOSIS — IMO0002 UNCONTROLLED TYPE 1 DIABETES WITH DIABETIC NEUROPATHY: ICD-10-CM

## 2021-04-16 DIAGNOSIS — E10.42 DIABETIC PERIPHERAL NEUROPATHY ASSOCIATED WITH TYPE 1 DIABETES MELLITUS (HCC): ICD-10-CM

## 2021-04-16 DIAGNOSIS — I25.10 CORONARY ARTERY DISEASE INVOLVING NATIVE CORONARY ARTERY OF NATIVE HEART WITHOUT ANGINA PECTORIS: ICD-10-CM

## 2021-04-16 DIAGNOSIS — Z96.41 INSULIN PUMP STATUS: ICD-10-CM

## 2021-04-16 DIAGNOSIS — Z95.5 HISTORY OF CORONARY ANGIOPLASTY WITH INSERTION OF STENT: ICD-10-CM

## 2021-04-16 PROCEDURE — 99215 OFFICE O/P EST HI 40 MIN: CPT | Performed by: INTERNAL MEDICINE

## 2021-04-16 PROCEDURE — 95251 CONT GLUC MNTR ANALYSIS I&R: CPT | Performed by: INTERNAL MEDICINE

## 2021-04-16 RX ORDER — PANTOPRAZOLE SODIUM 40 MG/1
40 TABLET, DELAYED RELEASE ORAL DAILY
COMMUNITY
Start: 2021-04-09

## 2021-05-27 ENCOUNTER — TELEPHONE (OUTPATIENT)
Dept: ENDOCRINOLOGY | Age: 66
End: 2021-05-27

## 2021-06-09 RX ORDER — CHLORPHENIR/PHENYLEPH/ASPIRIN 2-7.8-325
1 TABLET, EFFERVESCENT ORAL DAILY
Qty: 100 STRIP | Refills: 3 | Status: SHIPPED | OUTPATIENT
Start: 2021-06-09

## 2021-06-14 NOTE — TELEPHONE ENCOUNTER
Sensor data reviewed.  Patient is in auto mode 93% of the time.  Average glucose 142 mg per DL.  Total daily dose 41.1units/day.  Time in range 82%.  Time above range 17%.  Time below range 1%.  She has no early morning hypoglycemia.  She has few hypoglycemic events after breakfast. She had 1 day when her blood sugar was mostly in the 400s. Patient may have a bad infusion site.  Reduce snacking as discussed before.

## 2021-06-15 ENCOUNTER — TREATMENT (OUTPATIENT)
Dept: ENDOCRINOLOGY | Age: 66
End: 2021-06-15

## 2021-06-15 DIAGNOSIS — IMO0002 UNCONTROLLED TYPE 1 DIABETES MELLITUS WITH NEPHROPATHY: ICD-10-CM

## 2021-06-15 PROCEDURE — 95251 CONT GLUC MNTR ANALYSIS I&R: CPT | Performed by: INTERNAL MEDICINE

## 2021-06-15 NOTE — PROGRESS NOTES
Subjective   Opal Gutierrez brought in her at home CGM to be uploaded     Sensor data reviewed.   Patient is in auto mode 93% of the time.   Average glucose 142 mg per DL.   Total daily dose 41.1units/day.   Time in range 82%.   Time above range 17%.   Time below range 1%.   She has no early morning hypoglycemia.   She has few hypoglycemic events after breakfast. She had 1 day when her blood sugar was mostly in the 400s. Patient may have a bad infusion site.   Reduce snacking as discussed before.   Please send this back as a treatment plan so we can bill for the services.

## 2021-07-29 ENCOUNTER — TRANSCRIBE ORDERS (OUTPATIENT)
Dept: ADMINISTRATIVE | Facility: HOSPITAL | Age: 66
End: 2021-07-29

## 2021-07-29 DIAGNOSIS — M81.0 SENILE OSTEOPOROSIS: Primary | ICD-10-CM

## 2021-08-03 RX ORDER — ZOLEDRONIC ACID 5 MG/100ML
5 INJECTION, SOLUTION INTRAVENOUS ONCE
Status: CANCELLED | OUTPATIENT
Start: 2021-08-03

## 2021-08-03 RX ORDER — SODIUM CHLORIDE 9 MG/ML
250 INJECTION, SOLUTION INTRAVENOUS ONCE
Status: CANCELLED | OUTPATIENT
Start: 2021-08-03

## 2021-08-10 ENCOUNTER — HOSPITAL ENCOUNTER (OUTPATIENT)
Dept: INFUSION THERAPY | Facility: HOSPITAL | Age: 66
Discharge: HOME | End: 2021-08-10
Admitting: FAMILY MEDICINE

## 2021-08-10 VITALS
HEART RATE: 61 BPM | TEMPERATURE: 97.1 F | OXYGEN SATURATION: 100 % | DIASTOLIC BLOOD PRESSURE: 73 MMHG | BODY MASS INDEX: 18.45 KG/M2 | SYSTOLIC BLOOD PRESSURE: 129 MMHG | RESPIRATION RATE: 16 BRPM | WEIGHT: 125 LBS

## 2021-08-10 DIAGNOSIS — M81.0 SENILE OSTEOPOROSIS: Primary | ICD-10-CM

## 2021-08-10 PROCEDURE — 25010000002 ZOLEDRONIC ACID 5 MG/100ML SOLUTION: Performed by: FAMILY MEDICINE

## 2021-08-10 PROCEDURE — 96365 THER/PROPH/DIAG IV INF INIT: CPT

## 2021-08-10 RX ORDER — SODIUM CHLORIDE 9 MG/ML
250 INJECTION, SOLUTION INTRAVENOUS ONCE
Status: CANCELLED | OUTPATIENT
Start: 2022-08-10

## 2021-08-10 RX ORDER — ZOLEDRONIC ACID 5 MG/100ML
5 INJECTION, SOLUTION INTRAVENOUS ONCE
Status: CANCELLED | OUTPATIENT
Start: 2022-08-10

## 2021-08-10 RX ORDER — ZOLEDRONIC ACID 5 MG/100ML
5 INJECTION, SOLUTION INTRAVENOUS ONCE
Status: COMPLETED | OUTPATIENT
Start: 2021-08-10 | End: 2021-08-10

## 2021-08-10 RX ADMIN — ZOLEDRONIC ACID 5 MG: 0.05 INJECTION, SOLUTION INTRAVENOUS at 15:03

## 2022-02-17 ENCOUNTER — OFFICE VISIT (OUTPATIENT)
Dept: ENDOCRINOLOGY | Age: 67
End: 2022-02-17

## 2022-02-17 VITALS
DIASTOLIC BLOOD PRESSURE: 63 MMHG | HEIGHT: 68 IN | WEIGHT: 136.6 LBS | OXYGEN SATURATION: 97 % | HEART RATE: 61 BPM | SYSTOLIC BLOOD PRESSURE: 113 MMHG | BODY MASS INDEX: 20.7 KG/M2

## 2022-02-17 DIAGNOSIS — M81.0 OSTEOPOROSIS, POSTMENOPAUSAL: ICD-10-CM

## 2022-02-17 DIAGNOSIS — E10.42 DIABETIC PERIPHERAL NEUROPATHY ASSOCIATED WITH TYPE 1 DIABETES MELLITUS: ICD-10-CM

## 2022-02-17 DIAGNOSIS — I25.10 CORONARY ARTERY DISEASE INVOLVING NATIVE CORONARY ARTERY OF NATIVE HEART WITHOUT ANGINA PECTORIS: ICD-10-CM

## 2022-02-17 DIAGNOSIS — E55.9 VITAMIN D DEFICIENCY: ICD-10-CM

## 2022-02-17 DIAGNOSIS — E10.8 TYPE 1 DIABETES MELLITUS WITH COMPLICATIONS: Primary | ICD-10-CM

## 2022-02-17 DIAGNOSIS — Z96.41 INSULIN PUMP STATUS: ICD-10-CM

## 2022-02-17 PROCEDURE — 99214 OFFICE O/P EST MOD 30 MIN: CPT | Performed by: INTERNAL MEDICINE

## 2022-02-17 NOTE — PROGRESS NOTES
Subjective   Opal Gutierrez is a 66 y.o. female.     History of Present Illness        Patient is a 66-year-old female who came in for follow-up.      She has known diabetes mellitus since her 20s and was started on insulin a few years later.  She has been using an insulin pump since 2014.  She is on Medtronic 770 G insulin pump with a continuous glucose sensor.  Her last meal was at 2 PM.     Pump settings:  Basal rate: 12 AM is 0.45.  5 AM is 0.5.     Carbohydrate ratio: 12 midnight is 36.  7 AM is 9.  9 PM is 23.      Sensitivity: 120.     Target: 100-150     Patient is in auto mode 92% of the time.  Average glucose 160 mg per DL  Total daily dose 36.2 units.  Basal amount 17.5 units/day.  Time in target 76%.  Time above target 24%.  Time below target 0.  She has no early morning hypoglycemia.  She has intermittent postprandial hyperglycemia between lunch and supper when she takes a snack.     Her last eye examination was 6/21 and she has no retinopathy.  Urine microalbumin is normal in April 2021.  She has numbness in both feet and has difficulty with balance when she walks.    She has hyperlipidemia and is on atorvastatin 20 mg/day.  She denies myalgia.     She has coronary artery disease and had angioplasty with stent to the LAD done by Dr. Bruner in October 2020.  She is on amiodarone 200 mg/day, Coreg 3.125 mg daily, furosemide 20 mg as needed, Plavix 75 mg daily, aspirin 81 mg daily and spironolactone 25 mg 1/2 tablet daily.  She denies chest pain or shortness of breath.  She has intermittent pedal edema.    She has a history of pancreatitis around 1995 thought to be due to alcohol use.  She has been having diarrhea and is on Creon intermittently.      She had bleeding gastric ulcer and is on Protonix prescribed by Dr. Diggs.  She denies abdominal pain, melena, hematochezia or hematemesis     She has osteoporosis which is being managed by her gynecologist Dr. Nolen.  She has been receiving Reclast  "yearly.  She has received 3 or 4 doses.  She is on vit D 5000 u/day.            The following portions of the patient's history were reviewed and updated as appropriate: allergies, current medications, past family history, past medical history, past social history, past surgical history and problem list.    Review of Systems   Eyes: Negative for visual disturbance.   Respiratory: Negative for shortness of breath and wheezing.    Gastrointestinal: Positive for diarrhea. Negative for abdominal pain, anal bleeding, blood in stool and nausea.   Endocrine: Negative for cold intolerance and heat intolerance.   Genitourinary: Negative.    Musculoskeletal: Negative for myalgias.   Neurological: Negative for numbness.     Objective      Vitals:    02/17/22 1555   BP: 113/63   Pulse: 61   SpO2: 97%   Weight: 62 kg (136 lb 9.6 oz)   Height: 172.7 cm (68\")     Physical Exam  Constitutional:       General: She is not in acute distress.     Appearance: Normal appearance. She is not ill-appearing, toxic-appearing or diaphoretic.   Eyes:      General: No scleral icterus.        Right eye: No discharge.         Left eye: No discharge.      Extraocular Movements: Extraocular movements intact.      Conjunctiva/sclera: Conjunctivae normal.   Neck:      Vascular: No carotid bruit.   Cardiovascular:      Rate and Rhythm: Normal rate and regular rhythm.      Pulses: Normal pulses.      Heart sounds: Normal heart sounds.   Pulmonary:      Effort: Pulmonary effort is normal.      Breath sounds: Normal breath sounds.   Abdominal:      General: Bowel sounds are normal. There is no distension.      Palpations: Abdomen is soft. There is no mass.   Musculoskeletal:         General: No swelling or tenderness. Normal range of motion.      Cervical back: Normal range of motion and neck supple.   Lymphadenopathy:      Cervical: No cervical adenopathy.   Skin:     General: Skin is warm and dry.   Neurological:      Mental Status: She is alert and " oriented to person, place, and time.      Comments: Decreased light touch sensation on both lower extremities       Results Encounter on 03/30/2021   Component Date Value Ref Range Status   • Total Cholesterol 04/02/2021 98  0 - 200 mg/dL Final    Comment: Cholesterol Reference Ranges  (U.S. Department of Health and Human Services ATP III  Classifications)  Desirable          <200 mg/dL  Borderline High    200-239 mg/dL  High Risk          >240 mg/dL  Triglyceride Reference Ranges  (U.S. Department of Health and Human Services ATP III  Classifications)  Normal           <150 mg/dL  Borderline High  150-199 mg/dL  High             200-499 mg/dL  Very High        >500 mg/dL  HDL Reference Ranges  (U.S. Department of Health and Human Services ATP III  Classifcations)  Low     <40 mg/dl (major risk factor for CHD)  High    >60 mg/dl ('negative' risk factor for CHD)  LDL Reference Ranges  (U.S. Department of Health and Human Services ATP III  Classifcations)  Optimal          <100 mg/dL  Near Optimal     100-129 mg/dL  Borderline High  130-159 mg/dL  High             160-189 mg/dL  Very High        >189 mg/dL     • Triglycerides 04/02/2021 47  0 - 150 mg/dL Final   • HDL Cholesterol 04/02/2021 55  40 - 60 mg/dL Final   • VLDL Cholesterol [place] 04/02/2021 12  5 - 40 mg/dL Final   • LDL Chol Calc ([facility]) 04/02/2021 31  0 - 100 mg/dL Final   • Hemoglobin A1C 04/02/2021 8.50* 4.80 - 5.60 % Final    Comment: Hemoglobin A1C Ranges:  Increased Risk for Diabetes  5.7% to 6.4%  Diabetes                     >= 6.5%  Diabetic Goal                < 7.0%     • GGT 04/02/2021 24  5 - 36 U/L Final   • Glucose 04/02/2021 267* 65 - 99 mg/dL Final   • BUN 04/02/2021 8  8 - 23 mg/dL Final   • Creatinine 04/02/2021 0.95  0.57 - 1.00 mg/dL Final   • eGFR Non African Am 04/02/2021 59* >60 mL/min/1.73 Final    Comment: GFR Normal >60  Chronic Kidney Disease <60  Kidney Failure <15     • eGFR  Am 04/02/2021 72  >60 mL/min/1.73 Final   •  BUN/Creatinine Ratio 04/02/2021 8.4  7.0 - 25.0 Final   • Sodium 04/02/2021 134* 136 - 145 mmol/L Final   • Potassium 04/02/2021 5.1  3.5 - 5.2 mmol/L Final    Comment: Slight hemolysis detected by analyzer. Results may be  affected.     • Chloride 04/02/2021 95* 98 - 107 mmol/L Final   • Total CO2 04/02/2021 30.1* 22.0 - 29.0 mmol/L Final   • Calcium 04/02/2021 8.9  8.6 - 10.5 mg/dL Final   • Total Protein 04/02/2021 6.9  6.0 - 8.5 g/dL Final   • Albumin 04/02/2021 3.90  3.50 - 5.20 g/dL Final   • Globulin 04/02/2021 3.0  gm/dL Final   • A/G Ratio 04/02/2021 1.3  g/dL Final   • Total Bilirubin 04/02/2021 0.4  0.0 - 1.2 mg/dL Final   • Alkaline Phosphatase 04/02/2021 86  39 - 117 U/L Final   • AST (SGOT) 04/02/2021 33* 1 - 32 U/L Final   • ALT (SGPT) 04/02/2021 25  1 - 33 U/L Final   • Creatinine, Urine 04/02/2021 10.3  Not Estab. mg/dL Final   • Microalbumin, Urine 04/02/2021 <3.0  Not Estab. ug/mL Final   • Microalbumin/Creatinine Ratio 04/02/2021 <29  0 - 29 mg/g creat Final    Comment:                        Normal:                0 -  29                         Moderately increased: 30 - 300                         Severely increased:       >300     • Interpretation 04/02/2021 Note   Final    Supplemental report is available.   • PDF Image 04/02/2021 Not applicable   Final     Assessment/Plan   Diagnoses and all orders for this visit:    1. 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 Diabetic peripheral neuropathy associated with type 1 diabetes mellitus (HCC)  -     Vitamin B12    3. Osteoporosis, postmenopausal  -     Vitamin D 25 Hydroxy    4. Insulin pump status    5. Vitamin D deficiency  -     Vitamin D 25 Hydroxy    6. Coronary artery disease involving native coronary artery of native heart without angina  pectoris      Continue on current insulin pump settings  Continue Lipitor 20 mg/day.  Suggest taking Creon regularly and follow-up with Dr. Diggs.  Continue vitamin D3 5000 units/day.  Will defer treatment of osteoporosis to Dr. Nolen.  Continue amiodarone, Coreg, Plavix, furosemide, aspirin, and spironolactone.  Follow-up with Dr. Bruner.    Copy my note sent to Dr. Lozano, Dr. Bruner, and Dr. Diggs.    RTC 4 mos.

## 2022-02-25 LAB
25(OH)D3+25(OH)D2 SERPL-MCNC: 29.6 NG/ML (ref 30–100)
ALBUMIN SERPL-MCNC: 4.1 G/DL (ref 3.8–4.8)
ALBUMIN/CREAT UR: <6 MG/G CREAT (ref 0–29)
ALBUMIN/GLOB SERPL: 1.3 {RATIO} (ref 1.2–2.2)
ALP SERPL-CCNC: 99 IU/L (ref 44–121)
ALT SERPL-CCNC: 33 IU/L (ref 0–32)
AST SERPL-CCNC: 36 IU/L (ref 0–40)
BILIRUB SERPL-MCNC: 0.6 MG/DL (ref 0–1.2)
BUN SERPL-MCNC: 9 MG/DL (ref 8–27)
BUN/CREAT SERPL: 12 (ref 12–28)
CALCIUM SERPL-MCNC: 9.4 MG/DL (ref 8.7–10.3)
CHLORIDE SERPL-SCNC: 97 MMOL/L (ref 96–106)
CHOLEST SERPL-MCNC: 93 MG/DL (ref 100–199)
CO2 SERPL-SCNC: 26 MMOL/L (ref 20–29)
CREAT SERPL-MCNC: 0.77 MG/DL (ref 0.57–1)
CREAT UR-MCNC: 52.3 MG/DL
GLOBULIN SER CALC-MCNC: 3.1 G/DL (ref 1.5–4.5)
GLUCOSE SERPL-MCNC: 226 MG/DL (ref 65–99)
HBA1C MFR BLD: 7.9 % (ref 4.8–5.6)
HDLC SERPL-MCNC: 50 MG/DL
IMP & REVIEW OF LAB RESULTS: NORMAL
LDLC SERPL CALC-MCNC: 32 MG/DL (ref 0–99)
MICROALBUMIN UR-MCNC: <3 UG/ML
POTASSIUM SERPL-SCNC: 4.8 MMOL/L (ref 3.5–5.2)
PROT SERPL-MCNC: 7.2 G/DL (ref 6–8.5)
SODIUM SERPL-SCNC: 136 MMOL/L (ref 134–144)
T4 FREE SERPL-MCNC: 1.47 NG/DL (ref 0.82–1.77)
TRIGL SERPL-MCNC: 37 MG/DL (ref 0–149)
TSH SERPL DL<=0.005 MIU/L-ACNC: 2.25 UIU/ML (ref 0.45–4.5)
VIT B12 SERPL-MCNC: 376 PG/ML (ref 232–1245)
VLDLC SERPL CALC-MCNC: 11 MG/DL (ref 5–40)

## 2022-02-27 NOTE — PROGRESS NOTES
LDL 32.  HDL 50.  Continue atorvastatin 20 mg/day.Hemoglobin A1c improved to 7.9%.Urine microalbumin normal.Normal thyroid function tests.   25 hydroxy vitamin D improved at 29.6 ng/mL on vitamin D3 5000 units/day per Dr. Nolen.Normal vitamin B12.Copy of labs sent to Dr. Lozano, Dr. Nolen and to patient through [facility].

## 2022-03-10 DIAGNOSIS — IMO0002 DIABETES MELLITUS TYPE 1, UNCONTROLLED, WITH COMPLICATIONS: Primary | ICD-10-CM

## 2022-03-16 ENCOUNTER — LAB (OUTPATIENT)
Dept: ENDOCRINOLOGY | Age: 67
End: 2022-03-16

## 2022-03-16 DIAGNOSIS — IMO0002 DIABETES MELLITUS TYPE 1, UNCONTROLLED, WITH COMPLICATIONS: ICD-10-CM

## 2022-05-12 ENCOUNTER — TELEPHONE (OUTPATIENT)
Dept: ENDOCRINOLOGY | Age: 67
End: 2022-05-12

## 2022-06-15 ENCOUNTER — TELEPHONE (OUTPATIENT)
Dept: ENDOCRINOLOGY | Age: 67
End: 2022-06-15

## 2022-06-27 ENCOUNTER — TELEPHONE (OUTPATIENT)
Dept: ENDOCRINOLOGY | Age: 67
End: 2022-06-27

## 2022-06-27 NOTE — TELEPHONE ENCOUNTER
PATIENT CALLED NEEDS HER HUMOLOG REFILLED AND ITS FOR HER PUMP. NEEDS MORE THEN THE 4 VIRALS SHE GETS. SHE USES 2 VIRALS A MONTH.    CALL PATIENT WITH ANY QUESTIONS.    SEND TO:   [facility] - [address] - [phone]  - [phone] FX Phone:  [phone]   Fax:  [phone]

## 2022-06-30 ENCOUNTER — TELEPHONE (OUTPATIENT)
Dept: ENDOCRINOLOGY | Age: 67
End: 2022-06-30

## 2022-06-30 NOTE — TELEPHONE ENCOUNTER
RECD FAX FROM [email or web address] THEY NEED THE LAST TWO MOST RECENT OFFICE NOTES. SEE MEDIA FOR MORE INFO.

## 2022-07-05 ENCOUNTER — TELEPHONE (OUTPATIENT)
Dept: ENDOCRINOLOGY | Age: 67
End: 2022-07-05

## 2022-08-01 NOTE — PROGRESS NOTES
Subjective   Opal Gutierrez is a 67 y.o. female.     F/u for dm 1, osteoporosis, abn TFT/ testing bs 3 x day / last dm eye exam 3/8/22  / last dm foot exam 2/17/22 with dr Devi                       Patient is a 66-year-old female who came in for follow-up.      She has known diabetes mellitus since her 20s and was started on insulin a few years later.  She has been using an insulin pump since 2014.  She is on Medtronic 770 G insulin pump with a continuous glucose sensor.  She is using Humalog insulin.  Her last meal was at 8 AM.     Pump settings:  Basal rate: 12 AM is 0.45.  5 AM is 0.5.     Carbohydrate ratio: 12 midnight is 36.  7 AM is 9.  9 PM is 23.      Sensitivity: 120.     Target: 100-150     She has been having hypoglycemia before breakfast.  She wakes up around 4 AM and eats breakfast between 7 AM and 8 AM.  Unable to download [email or web address] data because of problem with their system.    Her last eye examination was 3/22 and she has no retinopathy.  Urine microalbumin is normal in 2/22.  She has numbness in both feet and has difficulty with balance when she walks. She is getting neuropathy therapy from her chiropractor.     She has hyperlipidemia and is on atorvastatin 20 mg/day.  She denies myalgia.      She has coronary artery disease and had angioplasty with stent to the LAD done by Dr. Bruner in October 2020.  She is on amiodarone 200 mg/day, Coreg 3.125 mg daily, furosemide 20 mg as needed, Plavix 75 mg daily, aspirin 81 mg daily and spironolactone 25 mg 1/2 tablet daily.  She denies chest pain or shortness of breath.      She has a history of pancreatitis around 1995 thought to be due to alcohol use.  She has been having diarrhea at least once a day and has not been using Creon. She sees oily film with stools and has floating stools.     She had bleeding gastric ulcer and is on Protonix prescribed by Dr. Diggs.  She had EGD and colonoscopy done by Dr. Diggs in 6/22.  She has intermittent melena and  "last episode was 1 week ago.  She denies abdominal pain, hematochezia or hematemesis     She has osteoporosis which is being managed by her gynecologist Dr. Nolen.  She has been receiving Reclast yearly.  She has received 3 or 4 doses.  She is on vit D 5000 u/day.  Her last bone density was in 2021 and was not done at [facility].      The following portions of the patient's history were reviewed and updated as appropriate: allergies, current medications, past family history, past medical history, past social history, past surgical history and problem list.    Review of Systems  Vitals:    08/02/22 0824   BP: 100/60   Pulse: 80   Temp: 98 °F (36.7 °C)   TempSrc: Temporal   SpO2: 99%   Weight: 61 kg (134 lb 6.4 oz)   Height: 172.7 cm (68\")      Objective   Physical Exam  Results Encounter on 03/10/2022   Component Date Value Ref Range Status   • C-Peptide 03/16/2022 <0.1 (A) 1.1 - 4.4 ng/mL Final    C-Peptide reference interval is for fasting patients.   • Glucose 03/16/2022 309 (A) 65 - 99 mg/dL Final   • Please note 03/16/2022 Comment   Final    Comment: We have received your request for additional testing or test  verification.  You will be notified if we are unable to process  your request.     • Written Authorization 03/16/2022 Comment   Final    Comment: Written Authorization Received.  Authorization received from WRITTEN REQUEST 03-  Logged by [facility]       Assessment & Plan   Diagnoses and all orders for this visit:    1. 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 Insulin pump status    3. Coronary artery disease involving native coronary artery of native heart without angina pectoris  -     Lipid Panel    4. History of coronary angioplasty with insertion of stent    5. Diabetic " peripheral neuropathy associated with type 1 diabetes mellitus (HCC)    6. Hyperlipidemia, unspecified hyperlipidemia type  -     Lipid Panel  -     Comprehensive Metabolic Panel    7. History of gastric ulcer  -     CBC & Differential  -     Iron Profile  -     Ferritin    8. Exocrine pancreatic insufficiency  -     CBC & Differential  -     Iron Profile  -     Ferritin      Decrease basal rate from 12 AM to 5 AM to 0.4 units/h.  Continue atorvastatin 20 mg daily.  Will defer treatment of coronary artery disease to Dr. Bruner.  Continue Protonix per Dr. Diggs.  Advised to follow-up with Dr. Diggs about possible pancreatic exocrine deficiency.  Advised patient to take Creon regularly.    Continue vitamin D3 5000 units/day.  Follow-up with Dr. Nolen for treatment of osteoporosis.    Copy of my note sent to Dr. Lozano and Dr. Diggs.    RTC 3 mos with JYOTSNA Jasso NP.  Total time 63 minutes.

## 2022-08-02 ENCOUNTER — OFFICE VISIT (OUTPATIENT)
Dept: ENDOCRINOLOGY | Age: 67
End: 2022-08-02

## 2022-08-02 VITALS
SYSTOLIC BLOOD PRESSURE: 100 MMHG | OXYGEN SATURATION: 99 % | BODY MASS INDEX: 20.37 KG/M2 | DIASTOLIC BLOOD PRESSURE: 60 MMHG | TEMPERATURE: 98 F | WEIGHT: 134.4 LBS | HEART RATE: 80 BPM | HEIGHT: 68 IN

## 2022-08-02 DIAGNOSIS — K86.81 EXOCRINE PANCREATIC INSUFFICIENCY: ICD-10-CM

## 2022-08-02 DIAGNOSIS — E78.5 HYPERLIPIDEMIA, UNSPECIFIED HYPERLIPIDEMIA TYPE: ICD-10-CM

## 2022-08-02 DIAGNOSIS — Z87.11 HISTORY OF GASTRIC ULCER: ICD-10-CM

## 2022-08-02 DIAGNOSIS — Z96.41 INSULIN PUMP STATUS: ICD-10-CM

## 2022-08-02 DIAGNOSIS — Z95.5 HISTORY OF CORONARY ANGIOPLASTY WITH INSERTION OF STENT: ICD-10-CM

## 2022-08-02 DIAGNOSIS — E10.42 DIABETIC PERIPHERAL NEUROPATHY ASSOCIATED WITH TYPE 1 DIABETES MELLITUS: ICD-10-CM

## 2022-08-02 DIAGNOSIS — E10.8 TYPE 1 DIABETES MELLITUS WITH COMPLICATIONS: Primary | ICD-10-CM

## 2022-08-02 DIAGNOSIS — I25.10 CORONARY ARTERY DISEASE INVOLVING NATIVE CORONARY ARTERY OF NATIVE HEART WITHOUT ANGINA PECTORIS: ICD-10-CM

## 2022-08-02 PROCEDURE — 99215 OFFICE O/P EST HI 40 MIN: CPT | Performed by: INTERNAL MEDICINE

## 2022-08-03 ENCOUNTER — TELEPHONE (OUTPATIENT)
Dept: OBSTETRICS AND GYNECOLOGY | Age: 67
End: 2022-08-03

## 2022-08-03 DIAGNOSIS — M81.0 SENILE OSTEOPOROSIS: Primary | ICD-10-CM

## 2022-08-03 LAB
ALBUMIN SERPL-MCNC: 4 G/DL (ref 3.8–4.8)
ALBUMIN/GLOB SERPL: 1.3 {RATIO} (ref 1.2–2.2)
ALP SERPL-CCNC: 104 IU/L (ref 44–121)
ALT SERPL-CCNC: 34 IU/L (ref 0–32)
AST SERPL-CCNC: 46 IU/L (ref 0–40)
BASOPHILS # BLD AUTO: 0.1 X10E3/UL (ref 0–0.2)
BASOPHILS NFR BLD AUTO: 1 %
BILIRUB SERPL-MCNC: 0.7 MG/DL (ref 0–1.2)
BUN SERPL-MCNC: 8 MG/DL (ref 8–27)
BUN/CREAT SERPL: 9 (ref 12–28)
CALCIUM SERPL-MCNC: 9.1 MG/DL (ref 8.7–10.3)
CHLORIDE SERPL-SCNC: 98 MMOL/L (ref 96–106)
CHOLEST SERPL-MCNC: 89 MG/DL (ref 100–199)
CO2 SERPL-SCNC: 27 MMOL/L (ref 20–29)
CREAT SERPL-MCNC: 0.86 MG/DL (ref 0.57–1)
EGFRCR SERPLBLD CKD-EPI 2021: 75 ML/MIN/1.73
ENDOMYSIUM IGA SER QL: NEGATIVE
EOSINOPHIL # BLD AUTO: 0.1 X10E3/UL (ref 0–0.4)
EOSINOPHIL NFR BLD AUTO: 2 %
ERYTHROCYTE [DISTWIDTH] IN BLOOD BY AUTOMATED COUNT: 14 % (ref 11.7–15.4)
FERRITIN SERPL-MCNC: 246 NG/ML (ref 15–150)
GLOBULIN SER CALC-MCNC: 3.2 G/DL (ref 1.5–4.5)
GLUCOSE SERPL-MCNC: 301 MG/DL (ref 65–99)
HBA1C MFR BLD: 8.4 % (ref 4.8–5.6)
HCT VFR BLD AUTO: 35.3 % (ref 34–46.6)
HDLC SERPL-MCNC: 48 MG/DL
HGB BLD-MCNC: 11.5 G/DL (ref 11.1–15.9)
IGA SERPL-MCNC: 289 MG/DL (ref 87–352)
IMM GRANULOCYTES # BLD AUTO: 0 X10E3/UL (ref 0–0.1)
IMM GRANULOCYTES NFR BLD AUTO: 0 %
IMP & REVIEW OF LAB RESULTS: NORMAL
IRON SATN MFR SERPL: 47 % (ref 15–55)
IRON SERPL-MCNC: 158 UG/DL (ref 27–139)
LDLC SERPL CALC-MCNC: 27 MG/DL (ref 0–99)
LYMPHOCYTES # BLD AUTO: 1.6 X10E3/UL (ref 0.7–3.1)
LYMPHOCYTES NFR BLD AUTO: 31 %
MCH RBC QN AUTO: 29.6 PG (ref 26.6–33)
MCHC RBC AUTO-ENTMCNC: 32.6 G/DL (ref 31.5–35.7)
MCV RBC AUTO: 91 FL (ref 79–97)
MONOCYTES # BLD AUTO: 0.5 X10E3/UL (ref 0.1–0.9)
MONOCYTES NFR BLD AUTO: 9 %
NEUTROPHILS # BLD AUTO: 2.9 X10E3/UL (ref 1.4–7)
NEUTROPHILS NFR BLD AUTO: 57 %
PLATELET # BLD AUTO: 232 X10E3/UL (ref 150–450)
POTASSIUM SERPL-SCNC: 5.5 MMOL/L (ref 3.5–5.2)
PROT SERPL-MCNC: 7.2 G/DL (ref 6–8.5)
RBC # BLD AUTO: 3.89 X10E6/UL (ref 3.77–5.28)
SODIUM SERPL-SCNC: 135 MMOL/L (ref 134–144)
T4 FREE SERPL-MCNC: 1.36 NG/DL (ref 0.82–1.77)
TIBC SERPL-MCNC: 334 UG/DL (ref 250–450)
TRIGL SERPL-MCNC: 63 MG/DL (ref 0–149)
TSH SERPL DL<=0.005 MIU/L-ACNC: 2 UIU/ML (ref 0.45–4.5)
TTG IGA SER-ACNC: <2 U/ML (ref 0–3)
UIBC SERPL-MCNC: 176 UG/DL (ref 118–369)
VIT B12 SERPL-MCNC: 376 PG/ML (ref 232–1245)
VLDLC SERPL CALC-MCNC: 14 MG/DL (ref 5–40)
WBC # BLD AUTO: 5.2 X10E3/UL (ref 3.4–10.8)

## 2022-08-03 RX ORDER — SODIUM CHLORIDE 9 MG/ML
250 INJECTION, SOLUTION INTRAVENOUS ONCE
Status: CANCELLED | OUTPATIENT
Start: 2022-08-03

## 2022-08-03 RX ORDER — ZOLEDRONIC ACID 5 MG/100ML
5 INJECTION, SOLUTION INTRAVENOUS ONCE
Status: CANCELLED | OUTPATIENT
Start: 2022-08-03

## 2022-08-03 NOTE — TELEPHONE ENCOUNTER
Pt called stating she needs a reclast infusion and she can't go get it until it's ordered. Please advise.

## 2022-08-05 NOTE — PROGRESS NOTES
Normal hemoglobin and hematocrit.  Serum iron slightly elevated at 158 mcg per DL.  Normal iron saturation.  Serum ferritin elevated at 246 ng/mL.  Hemoglobin A1c higher at 8.4%.  LDL 97.  HDL 28.  Triglycerides 63.  Continue atorvastatin 20 mg/day and low-fat diet.  AST and ALT mildly elevated.  Recheck with next follow-up.  Normal thyroid function tests.  Normal vitamin B12.  Normal celiac panel.  Copy of labs sent to Dr. Lozano.  Send copy of labs to Dr. Diggs.  Copy of labs sent patient to patient through [employer].

## 2022-08-12 VITALS — WEIGHT: 134.48 LBS | BODY MASS INDEX: 20.45 KG/M2

## 2022-08-17 ENCOUNTER — HOSPITAL ENCOUNTER (OUTPATIENT)
Dept: INFUSION THERAPY | Facility: HOSPITAL | Age: 67
Discharge: HOME | End: 2022-08-17

## 2022-08-24 VITALS — WEIGHT: 134.48 LBS | BODY MASS INDEX: 20.45 KG/M2

## 2022-08-29 ENCOUNTER — HOSPITAL ENCOUNTER (OUTPATIENT)
Dept: INFUSION THERAPY | Facility: HOSPITAL | Age: 67
Discharge: HOME | End: 2022-08-29

## 2022-09-27 ENCOUNTER — HOSPITAL ENCOUNTER (OUTPATIENT)
Dept: INFUSION THERAPY | Facility: HOSPITAL | Age: 67
Discharge: HOME | End: 2022-09-27
Admitting: OBSTETRICS & GYNECOLOGY

## 2022-09-27 VITALS
RESPIRATION RATE: 18 BRPM | WEIGHT: 130 LBS | DIASTOLIC BLOOD PRESSURE: 81 MMHG | OXYGEN SATURATION: 100 % | TEMPERATURE: 97.5 F | BODY MASS INDEX: 19.77 KG/M2 | HEART RATE: 77 BPM | SYSTOLIC BLOOD PRESSURE: 159 MMHG

## 2022-09-27 DIAGNOSIS — M81.0 SENILE OSTEOPOROSIS: Primary | ICD-10-CM

## 2022-09-27 PROCEDURE — 96365 THER/PROPH/DIAG IV INF INIT: CPT

## 2022-09-27 PROCEDURE — 25010000002 ZOLEDRONIC ACID 5 MG/100ML SOLUTION: Performed by: OBSTETRICS & GYNECOLOGY

## 2022-09-27 PROCEDURE — 36415 COLL VENOUS BLD VENIPUNCTURE: CPT

## 2022-09-27 RX ORDER — ZOLEDRONIC ACID 5 MG/100ML
5 INJECTION, SOLUTION INTRAVENOUS ONCE
Status: COMPLETED | OUTPATIENT
Start: 2022-09-27 | End: 2022-09-27

## 2022-09-27 RX ORDER — ZOLEDRONIC ACID 5 MG/100ML
5 INJECTION, SOLUTION INTRAVENOUS ONCE
Status: CANCELLED | OUTPATIENT
Start: 2023-09-27

## 2022-09-27 RX ORDER — SODIUM CHLORIDE 9 MG/ML
250 INJECTION, SOLUTION INTRAVENOUS ONCE
OUTPATIENT
Start: 2023-09-27

## 2022-09-27 RX ORDER — SODIUM CHLORIDE 9 MG/ML
250 INJECTION, SOLUTION INTRAVENOUS ONCE
Status: DISCONTINUED | OUTPATIENT
Start: 2022-09-27 | End: 2022-09-29 | Stop reason: HOSPADM

## 2022-09-27 RX ADMIN — ZOLEDRONIC ACID 5 MG: 0.05 INJECTION, SOLUTION INTRAVENOUS at 11:38

## 2022-10-18 ENCOUNTER — TELEPHONE (OUTPATIENT)
Dept: ENDOCRINOLOGY | Age: 67
End: 2022-10-18

## 2022-10-18 NOTE — TELEPHONE ENCOUNTER
PT IS COMING IN FOR LABS ON 10-24 AND HAS NO ORDER IN CHART. CAN YOU PUT SOME LAB ORDERS IN CHART PLEASE

## 2022-10-23 DIAGNOSIS — M81.0 OSTEOPOROSIS, POSTMENOPAUSAL: ICD-10-CM

## 2022-10-23 DIAGNOSIS — E55.9 VITAMIN D DEFICIENCY: ICD-10-CM

## 2022-10-23 DIAGNOSIS — E10.8 TYPE 1 DIABETES MELLITUS WITH COMPLICATIONS: ICD-10-CM

## 2022-10-23 DIAGNOSIS — E78.5 HYPERLIPIDEMIA, UNSPECIFIED HYPERLIPIDEMIA TYPE: ICD-10-CM

## 2022-10-23 DIAGNOSIS — R74.01 ELEVATED TRANSAMINASE LEVEL: ICD-10-CM

## 2022-10-23 DIAGNOSIS — E10.42 DIABETIC PERIPHERAL NEUROPATHY ASSOCIATED WITH TYPE 1 DIABETES MELLITUS: Primary | ICD-10-CM

## 2022-10-23 DIAGNOSIS — K86.81 EXOCRINE PANCREATIC INSUFFICIENCY: ICD-10-CM

## 2022-10-24 ENCOUNTER — LAB (OUTPATIENT)
Dept: ENDOCRINOLOGY | Age: 67
End: 2022-10-24

## 2022-10-24 DIAGNOSIS — E10.42 DIABETIC PERIPHERAL NEUROPATHY ASSOCIATED WITH TYPE 1 DIABETES MELLITUS: ICD-10-CM

## 2022-10-24 DIAGNOSIS — R74.01 ELEVATED TRANSAMINASE LEVEL: ICD-10-CM

## 2022-10-24 DIAGNOSIS — E55.9 VITAMIN D DEFICIENCY: ICD-10-CM

## 2022-10-24 DIAGNOSIS — E10.8 TYPE 1 DIABETES MELLITUS WITH COMPLICATIONS: ICD-10-CM

## 2022-10-24 DIAGNOSIS — K86.81 EXOCRINE PANCREATIC INSUFFICIENCY: ICD-10-CM

## 2022-10-24 DIAGNOSIS — M81.0 OSTEOPOROSIS, POSTMENOPAUSAL: ICD-10-CM

## 2022-10-24 DIAGNOSIS — E78.5 HYPERLIPIDEMIA, UNSPECIFIED HYPERLIPIDEMIA TYPE: ICD-10-CM

## 2022-10-25 LAB
25(OH)D3+25(OH)D2 SERPL-MCNC: 29.7 NG/ML (ref 30–100)
ALBUMIN SERPL-MCNC: 3.9 G/DL (ref 3.5–5.2)
ALBUMIN/GLOB SERPL: 1.2 G/DL
ALP SERPL-CCNC: 117 U/L (ref 39–117)
ALT SERPL-CCNC: 23 U/L (ref 1–33)
AST SERPL-CCNC: 28 U/L (ref 1–32)
BASOPHILS # BLD AUTO: 0.05 10*3/MM3 (ref 0–0.2)
BASOPHILS NFR BLD AUTO: 1.1 % (ref 0–1.5)
BILIRUB SERPL-MCNC: 1.1 MG/DL (ref 0–1.2)
BUN SERPL-MCNC: 10 MG/DL (ref 8–23)
BUN/CREAT SERPL: 11.6 (ref 7–25)
CALCIUM SERPL-MCNC: 9 MG/DL (ref 8.6–10.5)
CHLORIDE SERPL-SCNC: 95 MMOL/L (ref 98–107)
CHOLEST SERPL-MCNC: 82 MG/DL (ref 0–200)
CO2 SERPL-SCNC: 29.5 MMOL/L (ref 22–29)
CREAT SERPL-MCNC: 0.86 MG/DL (ref 0.57–1)
EGFRCR SERPLBLD CKD-EPI 2021: 75.1 ML/MIN/1.73
EOSINOPHIL # BLD AUTO: 0.07 10*3/MM3 (ref 0–0.4)
EOSINOPHIL NFR BLD AUTO: 1.5 % (ref 0.3–6.2)
ERYTHROCYTE [DISTWIDTH] IN BLOOD BY AUTOMATED COUNT: 12.9 % (ref 12.3–15.4)
FERRITIN SERPL-MCNC: 155 NG/ML (ref 13–150)
GGT SERPL-CCNC: 96 U/L (ref 5–36)
GLOBULIN SER CALC-MCNC: 3.3 GM/DL
GLUCOSE SERPL-MCNC: 370 MG/DL (ref 65–99)
HBA1C MFR BLD: 8.1 % (ref 4.8–5.6)
HCT VFR BLD AUTO: 35.6 % (ref 34–46.6)
HDLC SERPL-MCNC: 45 MG/DL (ref 40–60)
HGB BLD-MCNC: 11.9 G/DL (ref 12–15.9)
IMM GRANULOCYTES # BLD AUTO: 0.01 10*3/MM3 (ref 0–0.05)
IMM GRANULOCYTES NFR BLD AUTO: 0.2 % (ref 0–0.5)
IMP & REVIEW OF LAB RESULTS: NORMAL
IRON SATN MFR SERPL: 20 % (ref 20–50)
IRON SERPL-MCNC: 93 MCG/DL (ref 37–145)
LDLC SERPL CALC-MCNC: 26 MG/DL (ref 0–100)
LYMPHOCYTES # BLD AUTO: 1.18 10*3/MM3 (ref 0.7–3.1)
LYMPHOCYTES NFR BLD AUTO: 25.2 % (ref 19.6–45.3)
MCH RBC QN AUTO: 29.8 PG (ref 26.6–33)
MCHC RBC AUTO-ENTMCNC: 33.4 G/DL (ref 31.5–35.7)
MCV RBC AUTO: 89.2 FL (ref 79–97)
MONOCYTES # BLD AUTO: 0.62 10*3/MM3 (ref 0.1–0.9)
MONOCYTES NFR BLD AUTO: 13.2 % (ref 5–12)
NEUTROPHILS # BLD AUTO: 2.75 10*3/MM3 (ref 1.7–7)
NEUTROPHILS NFR BLD AUTO: 58.8 % (ref 42.7–76)
NRBC BLD AUTO-RTO: 0 /100 WBC (ref 0–0.2)
PLATELET # BLD AUTO: 171 10*3/MM3 (ref 140–450)
POTASSIUM SERPL-SCNC: 4.4 MMOL/L (ref 3.5–5.2)
PROT SERPL-MCNC: 7.2 G/DL (ref 6–8.5)
RBC # BLD AUTO: 3.99 10*6/MM3 (ref 3.77–5.28)
SODIUM SERPL-SCNC: 131 MMOL/L (ref 136–145)
TIBC SERPL-MCNC: 462 MCG/DL
TRIGL SERPL-MCNC: 38 MG/DL (ref 0–150)
TSH SERPL DL<=0.005 MIU/L-ACNC: 1.5 UIU/ML (ref 0.27–4.2)
UIBC SERPL-MCNC: 369 MCG/DL (ref 112–346)
VIT B12 SERPL-MCNC: 337 PG/ML (ref 211–946)
VLDLC SERPL CALC-MCNC: 11 MG/DL (ref 5–40)
WBC # BLD AUTO: 4.68 10*3/MM3 (ref 3.4–10.8)

## 2022-11-07 ENCOUNTER — OFFICE VISIT (OUTPATIENT)
Dept: ENDOCRINOLOGY | Age: 67
End: 2022-11-07

## 2022-11-07 VITALS
WEIGHT: 130.2 LBS | HEART RATE: 64 BPM | BODY MASS INDEX: 19.73 KG/M2 | HEIGHT: 68 IN | SYSTOLIC BLOOD PRESSURE: 116 MMHG | TEMPERATURE: 97.3 F | OXYGEN SATURATION: 100 % | DIASTOLIC BLOOD PRESSURE: 64 MMHG

## 2022-11-07 DIAGNOSIS — I10 BENIGN HYPERTENSION: ICD-10-CM

## 2022-11-07 DIAGNOSIS — E78.5 HYPERLIPIDEMIA, UNSPECIFIED HYPERLIPIDEMIA TYPE: ICD-10-CM

## 2022-11-07 DIAGNOSIS — E10.40 TYPE 1 DIABETES MELLITUS WITH DIABETIC NEUROPATHY: Primary | ICD-10-CM

## 2022-11-07 PROCEDURE — 95251 CONT GLUC MNTR ANALYSIS I&R: CPT | Performed by: NURSE PRACTITIONER

## 2022-11-07 PROCEDURE — 99214 OFFICE O/P EST MOD 30 MIN: CPT | Performed by: NURSE PRACTITIONER

## 2022-11-07 NOTE — PROGRESS NOTES
Chief Complaint  Chief Complaint   Patient presents with   • Diabetes     Type 1: Pt has pump, is up to date on eye exam, no hx of retinopathy, moderate neuropathy.        Subjective          History of Present Illness    Opal Gutierrez 67 y.o. presents for a follow-up evaluation for type 1 DM    She has been diabetic since her 20s and started insulin shortly after.  She has been using an insulin pump since 2014    Pt is on the following medications for their DM:  Humalog via Medtronic 770 G insulin pump with a continuous glucose sensor      Pump Settings    Basal rate: 12 AM is 0.4            5 AM is 0.5       Carbohydrate ratio: 12 midnight is 36.               7 AM is 9.               9 PM is 23.        Sensitivity: 120.      Target: 100-150          Pt complains of numbness and tingling in feet    Denies diarrhea, constipation, chest pain and shortness of breath.    Weight loss of 4 lbs since last visit.    Pt does not have a history of DM retinopathy.  Last eye exam was 03/22    Pt does not have a history of nephropathy.  Patient is not currently taking ACE/ARB    Pt does have neuropathy.  She is getting neuropathy therapy from her chiropractor.    Pt does have a history of CAD and had angioplasty with stent to the LAD done by Dr. Bruner in October 2020    Last A1C in 10/22 was 8.10    Last microalbumin in 2/22 was negative            Blood Sugars    Blood glucoses are checked 4-5/day.    Fasting blood glucoses: low to mid 100s    Pre-meal blood glucoses: 60s - 200s    Pt has had some episodes of hypoglycemia.      Has changed diet over the past couple of weeks and BGs have been good          Sensor Data    Time in range 76%  High 22%  Very high 1%  Low 1%  Very low 0%      Average Glucose - 180 mg/dL      Sensor Wear - 91 %         Time in Auto Mode - 87 %  Time in Manual Mode - 13 %         Hyperlipidemia     Pt denies any muscle/body aches, chest pain, or shortness of breath    Pt is currently taking  "atorvastatin 20 mg    Last lipid panel in 10/22 showed Total 82, Triglycerides 38, HDL 45 and LDL 26            Hypertension    Pt has Ischemic Cardiomyopathy    Pt denies any chest pain, palpitations, shortness of breath, or headache    Current regimen includes amiodarone 200 mg daily, carvedilol  3.125 mg BID, furosemide 20 mg PRN and spironolactone 25 mg 1/2 tablet daily.          Other History    She has a history of pancreatitis around 1995 thought to be due to alcohol use      She has osteoporosis which is being managed by her gynecologist Dr. Nolen      Pt had liver biopsy 11/22 - awaiting results        I have reviewed the patient's allergies, medicines, past medical hx, family hx and social hx.    Objective   Vital Signs:   /64   Pulse 64   Temp 97.3 °F (36.3 °C) (Temporal)   Ht 172.7 cm (67.99\")   Wt 59.1 kg (130 lb 3.2 oz)   SpO2 100%   BMI 19.80 kg/m²       Physical Exam   Physical Exam  Constitutional:       General: She is not in acute distress.     Appearance: Normal appearance. She is not diaphoretic.   HENT:      Head: Normocephalic and atraumatic.   Eyes:      General:         Right eye: No discharge.         Left eye: No discharge.   Skin:     General: Skin is warm and dry.   Neurological:      Mental Status: She is alert.   Psychiatric:         Mood and Affect: Mood normal.         Behavior: Behavior normal.                    Results Review:   Hemoglobin A1C   Date Value Ref Range Status   10/24/2022 8.10 (H) 4.80 - 5.60 % Final     Comment:     Hemoglobin A1C Ranges:  Increased Risk for Diabetes  5.7% to 6.4%  Diabetes                     >= 6.5%  Diabetic Goal                < 7.0%     05/07/2021 9.3 (H) 4.3 - 5.6 % Final     Comment:     This method for A1C detected the possible presence of a variant hemoglobin. May indicate further testing, suggest Hemoglobin Electropheresis.  (note)  A1C% Reference Range:  4.3 - 5.6  Normal range  5.7 - 6.4  Pre-diabetic -increased risk for " developing diabetes mellitus.  >=6.5      Diabetic -diagnostic of diabetes mellitus.  Note: For diagnosis of diabetes in individuals without unequivocal   hyperglycemia, results should be confirmed by repeat testing.    Patients with conditions that shorten erythrocyte survival, such as   recovery from acute blood loss, hemolytic anemia, kidney disease,   or the presence of unstable hemogloblins like HbSS, HbCC, and HbSC   may yield falsely decreased HbA1c test results. Iron deficiency may   yield falsely increased HbA1c test results.     Triglycerides   Date Value Ref Range Status   10/24/2022 38 0 - 150 mg/dL Final     HDL Cholesterol   Date Value Ref Range Status   10/24/2022 45 40 - 60 mg/dL Final     LDL Chol Calc ([facility])   Date Value Ref Range Status   10/24/2022 26 0 - 100 mg/dL Final     VLDL Cholesterol [place]   Date Value Ref Range Status   10/24/2022 11 5 - 40 mg/dL Final         Assessment and Plan {CC Problem List  Visit Diagnosis  ROS  Review (Popup)  Health Maintenance  Quality  BestPractice  Medications  SmartSets  SnapShot Encounters  Media :23  Diagnoses and all orders for this visit:    1. Type 1 diabetes mellitus with diabetic neuropathy (HCC) (Primary)  -     HumaLOG 100 UNIT/ML injection; Patient Sig: Inject 60 Units the skin via insulin pump E10.8  Dispense: 40 mL; Refill: 1  -     Hemoglobin A1c; Future  -     Comprehensive Metabolic Panel; Future    Not much change in A1C of 8.1%  Continue with Humalog via Medtronic 770 G insulin pump with a continuous glucose sensor with changes below  Carbohydrate ratio: 12 midnight is 34.               7 AM is 9.5               9 PM is 23.     Continue with BG checks  Check labs prior to next visit       2. Hyperlipidemia, unspecified hyperlipidemia type  -     Comprehensive Metabolic Panel; Future  -     Lipid Panel; Future    Lipid panel is good  Continue with statin  Check labs prior to next visit       3. Benign hypertension  -      Comprehensive Metabolic Panel; Future    Stable  Continue with current medication regimen  Defer management to PCP/caridology        Refills sent to pharmacy      RTC in 3 months with me, labs prior and 6 months with Dr. Devi      Follow Up     Patient was given instructions and counseling regarding her condition or for health maintenance advice. Please see specific information pulled into the AVS if appropriate.              Bertha Jasso, KATELIN  11/07/22

## 2023-01-24 ENCOUNTER — LAB (OUTPATIENT)
Dept: ENDOCRINOLOGY | Age: 68
End: 2023-01-24
Payer: MEDICARE

## 2023-01-24 DIAGNOSIS — I10 BENIGN HYPERTENSION: ICD-10-CM

## 2023-01-24 DIAGNOSIS — E78.5 HYPERLIPIDEMIA, UNSPECIFIED HYPERLIPIDEMIA TYPE: ICD-10-CM

## 2023-01-24 DIAGNOSIS — E10.40 TYPE 1 DIABETES MELLITUS WITH DIABETIC NEUROPATHY: ICD-10-CM

## 2023-01-25 LAB
ALBUMIN SERPL-MCNC: 3.7 G/DL (ref 3.5–5.2)
ALBUMIN/GLOB SERPL: 1.1 G/DL
ALP SERPL-CCNC: 118 U/L (ref 39–117)
ALT SERPL-CCNC: 28 U/L (ref 1–33)
AST SERPL-CCNC: 37 U/L (ref 1–32)
BILIRUB SERPL-MCNC: 0.4 MG/DL (ref 0–1.2)
BUN SERPL-MCNC: 12 MG/DL (ref 8–23)
BUN/CREAT SERPL: 13 (ref 7–25)
CALCIUM SERPL-MCNC: 9 MG/DL (ref 8.6–10.5)
CHLORIDE SERPL-SCNC: 96 MMOL/L (ref 98–107)
CHOLEST SERPL-MCNC: 98 MG/DL (ref 0–200)
CO2 SERPL-SCNC: 31.5 MMOL/L (ref 22–29)
CREAT SERPL-MCNC: 0.92 MG/DL (ref 0.57–1)
EGFRCR SERPLBLD CKD-EPI 2021: 69.2 ML/MIN/1.73
GLOBULIN SER CALC-MCNC: 3.5 GM/DL
GLUCOSE SERPL-MCNC: 195 MG/DL (ref 65–99)
HBA1C MFR BLD: 9.4 % (ref 4.8–5.6)
HDLC SERPL-MCNC: 44 MG/DL (ref 40–60)
IMP & REVIEW OF LAB RESULTS: NORMAL
LDLC SERPL CALC-MCNC: 44 MG/DL (ref 0–100)
POTASSIUM SERPL-SCNC: 4.2 MMOL/L (ref 3.5–5.2)
PROT SERPL-MCNC: 7.2 G/DL (ref 6–8.5)
SODIUM SERPL-SCNC: 135 MMOL/L (ref 136–145)
TRIGL SERPL-MCNC: 34 MG/DL (ref 0–150)
VLDLC SERPL CALC-MCNC: 10 MG/DL (ref 5–40)

## 2023-02-07 ENCOUNTER — OFFICE VISIT (OUTPATIENT)
Dept: ENDOCRINOLOGY | Age: 68
End: 2023-02-07
Payer: MEDICARE

## 2023-02-07 VITALS
OXYGEN SATURATION: 98 % | WEIGHT: 134 LBS | DIASTOLIC BLOOD PRESSURE: 60 MMHG | HEART RATE: 72 BPM | BODY MASS INDEX: 21.03 KG/M2 | TEMPERATURE: 97 F | HEIGHT: 67 IN | SYSTOLIC BLOOD PRESSURE: 118 MMHG

## 2023-02-07 DIAGNOSIS — E78.5 HYPERLIPIDEMIA, UNSPECIFIED HYPERLIPIDEMIA TYPE: ICD-10-CM

## 2023-02-07 DIAGNOSIS — I10 BENIGN HYPERTENSION: ICD-10-CM

## 2023-02-07 DIAGNOSIS — E10.40 TYPE 1 DIABETES MELLITUS WITH DIABETIC NEUROPATHY: Primary | ICD-10-CM

## 2023-02-07 PROCEDURE — 99214 OFFICE O/P EST MOD 30 MIN: CPT | Performed by: NURSE PRACTITIONER

## 2023-02-07 PROCEDURE — 3046F HEMOGLOBIN A1C LEVEL >9.0%: CPT | Performed by: NURSE PRACTITIONER

## 2023-02-07 NOTE — PROGRESS NOTES
Chief Complaint  Chief Complaint   Patient presents with   • Diabetes     Type1: patient uses a pump she has no hx of retinopathy with a moderate case of neuropathy in her feet        Subjective          History of Present Illness    Opal Gutierrez 67 y.o. presents for a follow-up evaluation for type 1 DM     She has been diabetic since her 20s and started insulin shortly after.  She has been using an insulin pump since 2014     Pt is on the following medications for their DM:  Humalog via Medtronic 770 G insulin pump with a continuous glucose sensor        Pump Settings     Basal rate: 12 AM is 0.4                       5 AM is 0.5        Carbohydrate ratio: 12 midnight is 34.                                     7 AM is 9.5                                     9 PM is 23.         Sensitivity: 120.        Target: 100-150              Pt complains of numbness and tingling in feet     Denies chest pain, shortness of breath and vision changes    Weight gain of 4 lbs since last visit.    Pt does not have a history of DM retinopathy.  Last eye exam was 03/22     Pt does not have a history of nephropathy.  Patient is not currently taking ACE/ARB     Pt does have neuropathy.  She is getting neuropathy therapy from her chiropractor.     Pt does have a history of CAD and had angioplasty with stent to the LAD done by Dr. Bruner in October 2020    Last A1C in 01/23 was 9.4    Last microalbumin in 2/22 was negative          Blood Sugars    Blood glucoses are checked 4-5/day.    Fasting blood glucoses: 200s    Pre-meal blood glucoses: 200s    Pt has rare episodes of hypoglycemia.          Sensor Data    Time in range 37%  High 45%  Very high 17%  Low 1%  Very low 0%      Average Glucose - 220 mg/dL      Sensor Wear - 91 %        Time in Auto Mode - 0 %  Time in Manual Mode - 100 %           Hyperlipidemia     Pt denies any muscle/body aches, chest pain or shortness of breath    Pt is currently taking atorvastatin 20  "mg    Last lipid panel in 01/23 showed Total 98, Triglycerides 34, HDL 44 and LDL 44            Hypertension    Pt has Ischemic Cardiomyopathy     Pt denies any chest pain, palpitations, shortness of breath, or headache     Current regimen includes amiodarone 200 mg daily, carvedilol  3.125 mg BID, furosemide 20 mg PRN and spironolactone 25 mg 1/2 tablet daily.              Other History     She has a history of pancreatitis around 1995 thought to be due to alcohol use        She has osteoporosis which is being managed by her gynecologist Dr. Nolen        Pt had liver biopsy 11/22 which showed partially regressed cirrhosis            I have reviewed the patient's allergies, medicines, past medical hx, family hx and social hx.    Objective   Vital Signs:   /60   Pulse 72   Temp 97 °F (36.1 °C) (Temporal)   Ht 170.2 cm (67\")   Wt 60.8 kg (134 lb)   SpO2 98%   BMI 20.99 kg/m²       Physical Exam   Physical Exam  Constitutional:       General: She is not in acute distress.     Appearance: Normal appearance. She is not diaphoretic.   HENT:      Head: Normocephalic and atraumatic.   Eyes:      General:         Right eye: No discharge.         Left eye: No discharge.   Skin:     General: Skin is warm and dry.   Neurological:      Mental Status: She is alert.   Psychiatric:         Mood and Affect: Mood normal.         Behavior: Behavior normal.                    Results Review:   Hemoglobin A1C   Date Value Ref Range Status   01/24/2023 9.40 (H) 4.80 - 5.60 % Final     Comment:     Hemoglobin A1C Ranges:  Increased Risk for Diabetes  5.7% to 6.4%  Diabetes                     >= 6.5%  Diabetic Goal                < 7.0%     05/07/2021 9.3 (H) 4.3 - 5.6 % Final     Comment:     This method for A1C detected the possible presence of a variant hemoglobin. May indicate further testing, suggest Hemoglobin Electropheresis.  (note)  A1C% Reference Range:  4.3 - 5.6  Normal range  5.7 - 6.4  Pre-diabetic -increased risk " for developing diabetes mellitus.  >=6.5      Diabetic -diagnostic of diabetes mellitus.  Note: For diagnosis of diabetes in individuals without unequivocal   hyperglycemia, results should be confirmed by repeat testing.    Patients with conditions that shorten erythrocyte survival, such as   recovery from acute blood loss, hemolytic anemia, kidney disease,   or the presence of unstable hemogloblins like HbSS, HbCC, and HbSC   may yield falsely decreased HbA1c test results. Iron deficiency may   yield falsely increased HbA1c test results.     Triglycerides   Date Value Ref Range Status   01/24/2023 34 0 - 150 mg/dL Final     HDL Cholesterol   Date Value Ref Range Status   01/24/2023 44 40 - 60 mg/dL Final     LDL Chol Calc (NIH)   Date Value Ref Range Status   01/24/2023 44 0 - 100 mg/dL Final     VLDL Cholesterol [place]   Date Value Ref Range Status   01/24/2023 10 5 - 40 mg/dL Final         Assessment and Plan {CC Problem List  Visit Diagnosis  ROS  Review (Popup)  Health Maintenance  Quality  BestPractice  Medications  SmartSets  SnapShot Encounters  Media :23  Diagnoses and all orders for this visit:    1. Type 1 diabetes mellitus with diabetic neuropathy (HCC) (Primary)  -     Hemoglobin A1c; Future  -     Comprehensive Metabolic Panel; Future  -     Microalbumin / Creatinine Urine Ratio - Urine, Clean Catch; Future    Continue with Humalog via Medtronic 770 G insulin pump with a continuous glucose sensor with changes below  Basal rate: 12 AM is 0.6                       5 AM is 0.7  Pt has not been in automode and didn't realize/understand what automode was.  When compared to last download, which she was in automode 80 some percent of the time, she was spending more time in range.  At last download she was using about 24 units daily for her total basal and current basal rates are significantly below that.  These were increased.  Turned automode back on and showed patient automode readiness screen to  see what all is needed to enter back into automode  Advised patient to stay in automode as much as possible  A1C is higher at 9.4%  Continue with BG checks      2. Hyperlipidemia, unspecified hyperlipidemia type  -     Comprehensive Metabolic Panel; Future  -     Lipid Panel; Future    Lipid panel is good.    Continue with statin.      3. Benign hypertension  -     Comprehensive Metabolic Panel; Future     Stable  Continue with current medication regimen  Defer management to PCP       No refills needed at this time      RTC on 05/09/23 with Dr. Devi, labs prior - needs lab appointment      Follow Up     Patient was given instructions and counseling regarding her condition or for health maintenance advice. Please see specific information pulled into the AVS if appropriate.              Bertha Jasso, APRN  02/07/23

## 2023-02-15 RX ORDER — FUROSEMIDE 20 MG/1
TABLET ORAL
Qty: 15 TABLET | Refills: 0 | Status: SHIPPED | OUTPATIENT
Start: 2023-02-15 | End: 2023-03-14

## 2023-03-14 DIAGNOSIS — I50.9 CONGESTIVE HEART FAILURE, UNSPECIFIED HF CHRONICITY, UNSPECIFIED HEART FAILURE TYPE: Primary | ICD-10-CM

## 2023-03-14 RX ORDER — FUROSEMIDE 20 MG/1
TABLET ORAL
Qty: 15 TABLET | Refills: 0 | Status: SHIPPED | OUTPATIENT
Start: 2023-03-14

## 2023-03-14 NOTE — TELEPHONE ENCOUNTER
Rx Refill Note  Requested Prescriptions     Pending Prescriptions Disp Refills   • furosemide (LASIX) 20 MG tablet [Pharmacy Med Name: FUROSEMIDE 20MG TABLETS] 15 tablet 0     Sig: TAKE 1 TABLET BY MOUTH EVERY DAY AS NEEDED FOR SWELLING      Last office visit with prescribing clinician: 11/22/2022-no labs drawn  Last labs: 8/2/2021  (Info found on [facility])    Next office visit with prescribing clinician: Not scheduled yet      Leonor Chowdhury MA  03/14/23, 11:23 EDT

## 2023-04-10 DIAGNOSIS — I50.9 CONGESTIVE HEART FAILURE, UNSPECIFIED HF CHRONICITY, UNSPECIFIED HEART FAILURE TYPE: ICD-10-CM

## 2023-04-10 RX ORDER — FUROSEMIDE 20 MG/1
TABLET ORAL
Qty: 15 TABLET | Refills: 0 | Status: SHIPPED | OUTPATIENT
Start: 2023-04-10

## 2023-04-21 ENCOUNTER — PRIOR AUTHORIZATION (OUTPATIENT)
Dept: ENDOCRINOLOGY | Age: 68
End: 2023-04-21
Payer: MEDICARE

## 2023-04-21 DIAGNOSIS — E10.40 TYPE 1 DIABETES MELLITUS WITH DIABETIC NEUROPATHY: ICD-10-CM

## 2023-04-25 ENCOUNTER — LAB (OUTPATIENT)
Dept: ENDOCRINOLOGY | Age: 68
End: 2023-04-25
Payer: MEDICARE

## 2023-04-25 DIAGNOSIS — I10 BENIGN HYPERTENSION: ICD-10-CM

## 2023-04-25 DIAGNOSIS — E10.40 TYPE 1 DIABETES MELLITUS WITH DIABETIC NEUROPATHY: ICD-10-CM

## 2023-04-25 DIAGNOSIS — E78.5 HYPERLIPIDEMIA, UNSPECIFIED HYPERLIPIDEMIA TYPE: ICD-10-CM

## 2023-04-25 DIAGNOSIS — M81.0 SENILE OSTEOPOROSIS: ICD-10-CM

## 2023-04-26 LAB
ALBUMIN SERPL-MCNC: 4 G/DL (ref 3.5–5.2)
ALBUMIN/GLOB SERPL: 1.1 G/DL
ALP SERPL-CCNC: 112 U/L (ref 39–117)
ALT SERPL-CCNC: 30 U/L (ref 1–33)
AST SERPL-CCNC: 37 U/L (ref 1–32)
BILIRUB SERPL-MCNC: 0.6 MG/DL (ref 0–1.2)
BUN SERPL-MCNC: 15 MG/DL (ref 8–23)
BUN/CREAT SERPL: 16.7 (ref 7–25)
CALCIUM SERPL-MCNC: 9.3 MG/DL (ref 8.6–10.5)
CHLORIDE SERPL-SCNC: 97 MMOL/L (ref 98–107)
CHOLEST SERPL-MCNC: 116 MG/DL (ref 0–200)
CO2 SERPL-SCNC: 27.4 MMOL/L (ref 22–29)
CREAT SERPL-MCNC: 0.9 MG/DL (ref 0.57–1)
EGFRCR SERPLBLD CKD-EPI 2021: 70.7 ML/MIN/1.73
GLOBULIN SER CALC-MCNC: 3.6 GM/DL
GLUCOSE SERPL-MCNC: 343 MG/DL (ref 65–99)
HBA1C MFR BLD: 10.4 % (ref 4.8–5.6)
HDLC SERPL-MCNC: 40 MG/DL (ref 40–60)
IMP & REVIEW OF LAB RESULTS: NORMAL
LDLC SERPL CALC-MCNC: 63 MG/DL (ref 0–100)
POTASSIUM SERPL-SCNC: 5 MMOL/L (ref 3.5–5.2)
PROT SERPL-MCNC: 7.6 G/DL (ref 6–8.5)
SODIUM SERPL-SCNC: 136 MMOL/L (ref 136–145)
TRIGL SERPL-MCNC: 59 MG/DL (ref 0–150)
UNABLE TO VOID: NORMAL
VLDLC SERPL CALC-MCNC: 13 MG/DL (ref 5–40)

## 2023-05-09 ENCOUNTER — OFFICE VISIT (OUTPATIENT)
Dept: ENDOCRINOLOGY | Age: 68
End: 2023-05-09
Payer: MEDICARE

## 2023-05-09 VITALS
WEIGHT: 133.6 LBS | DIASTOLIC BLOOD PRESSURE: 62 MMHG | BODY MASS INDEX: 20.97 KG/M2 | OXYGEN SATURATION: 97 % | HEIGHT: 67 IN | HEART RATE: 76 BPM | TEMPERATURE: 97.3 F | SYSTOLIC BLOOD PRESSURE: 100 MMHG

## 2023-05-09 DIAGNOSIS — Z96.41 INSULIN PUMP STATUS: ICD-10-CM

## 2023-05-09 DIAGNOSIS — E10.8 TYPE 1 DIABETES MELLITUS WITH COMPLICATIONS: Primary | ICD-10-CM

## 2023-05-09 DIAGNOSIS — M81.0 OSTEOPOROSIS, POSTMENOPAUSAL: ICD-10-CM

## 2023-05-09 DIAGNOSIS — K86.81 EXOCRINE PANCREATIC INSUFFICIENCY: ICD-10-CM

## 2023-05-09 DIAGNOSIS — I25.10 CORONARY ARTERY DISEASE INVOLVING NATIVE CORONARY ARTERY OF NATIVE HEART WITHOUT ANGINA PECTORIS: ICD-10-CM

## 2023-05-09 DIAGNOSIS — Z46.81 INSULIN PUMP TITRATION: ICD-10-CM

## 2023-05-09 DIAGNOSIS — E78.5 HYPERLIPIDEMIA, UNSPECIFIED HYPERLIPIDEMIA TYPE: ICD-10-CM

## 2023-05-09 DIAGNOSIS — E10.40 TYPE 1 DIABETES MELLITUS WITH DIABETIC NEUROPATHY: ICD-10-CM

## 2023-05-09 NOTE — PROGRESS NOTES
Subjective   Opal Gutierrez is a 68 y.o. female.     History of Present Illness     Patient is a 66-year-old female who came in for follow-up.      She has known diabetes mellitus since her 20s and was started on insulin a few years later.  She has been using an insulin pump since 2014.  She is on Medtronic 770 G insulin pump with a continuous glucose sensor.  She is using Humalog insulin.    Hemoglobin A1c done in April 2023 is 10.4%.    Sensor data reviewed.  Time in target 67%.  Time above target 31%.  Time below target 01%.  Auto mode 90%.  Average blood glucose 188 mg per DL.    GMI 7.1%.  Total daily dose 56.4   bolus 27.4 units/day.  She has intermittent hypoglycemia in the early morning around 2 to 3 AM.  She has no hypoglycemia during the daytime.  She has postprandial hyperglycemia mostly after breakfast.    Pump settings:  Basal rate: 12 AM is 0.6.  5 AM is 0.7.     Carbohydrate ratio: 12 midnight is 34.  7 AM is 9.5.  9 PM is 23.      Sensitivity: 120.     Target: 100-150     Her last eye examination was 2022 and she has no retinopathy.  Urine microalbumin is normal in 2/22.  She has numbness in both feet and has difficulty with balance when she walks. She is getting neuropathy therapy from her chiropractor Dr. Schafer..     She has hyperlipidemia and is on atorvastatin 20 mg/day.  She denies myalgia.  Lipid panel done in April 2023 are as follows: Cholesterol 116.  HDL 40.  LDL 63.  Triglycerides 59.    She had a liver biopsy done in November 2022 at [place].  Pathology showed features consistent with partially regressed cirrhosis.  She is being followed by Dr. Diggs     She has a history of pancreatitis around 1995 thought to be due to alcohol use.  Diarrhea, oily film with stools and floating stools have resolved since she started taking Creon 2 tabs with each meal regularly.     She had bleeding gastric ulcer and is on Protonix prescribed by Dr. Diggs.  She had EGD and colonoscopy done by Dr. Diggs  "in 6/22.  She denies melena, hematochezia or hematemesis.    She was seen by neurologist Dr. Cheng in 2015 for numbness on her right hand.  EMG showed mixed sensorineural peripheral neuropathy.  An underlying mononeuropathies of the right ulnar or median nerve cannot be ruled out.  Hand numbness has resolved without intervention.  Dr. Cheng has left town since.    She has coronary artery disease and had angioplasty with stent to the LAD done by Dr. Bruner in October 2020.  She is on amiodarone 200 mg/day, Coreg 3.125 mg daily, furosemide 20 mg as needed, Plavix 75 mg daily, aspirin 81 mg daily and spironolactone 25 mg 1/2 tablet daily.  She denies chest pain or shortness of breath.      She has osteoporosis which is being managed by her gynecologist Dr. Nolen.  She has been receiving Reclast yearly.  She has received 3 or 4 doses.  She is on vit D 5000 u/day.  Her last bone density was in 2021 and was not done at [facility].    The following portions of the patient's history were reviewed and updated as appropriate: allergies, current medications, past family history, past medical history, past social history, past surgical history and problem list.    Review of Systems   Eyes: Negative for visual disturbance.   Respiratory: Negative for shortness of breath.    Cardiovascular: Negative for chest pain and palpitations.   Gastrointestinal: Negative for anal bleeding and blood in stool.   Endocrine: Negative for cold intolerance and heat intolerance.   Genitourinary: Negative.    Musculoskeletal: Negative for myalgias.   Neurological: Positive for numbness.     Vitals:    05/09/23 0921   BP: 100/62   Pulse: 76   Temp: 97.3 °F (36.3 °C)   SpO2: 97%   Weight: 60.6 kg (133 lb 9.6 oz)   Height: 170.2 cm (67.01\")      Objective   Physical Exam  Constitutional:       Appearance: Normal appearance.   Eyes:      General: No scleral icterus.        Right eye: No discharge.         Left eye: No discharge.   Neck:      Vascular: " No carotid bruit.   Cardiovascular:      Rate and Rhythm: Normal rate and regular rhythm.      Pulses: Normal pulses.      Heart sounds: Normal heart sounds.   Pulmonary:      Breath sounds: Normal breath sounds.   Abdominal:      Palpations: Abdomen is soft.      Tenderness: There is no right CVA tenderness or left CVA tenderness.   Musculoskeletal:      Right lower leg: No edema.      Left lower leg: No edema.      Comments: Hammertoe deformities on both feet.  No plantar ulcers.   Lymphadenopathy:      Cervical: No cervical adenopathy.   Skin:     General: Skin is warm.   Neurological:      Mental Status: She is alert and oriented to person, place, and time.      Comments: Diminished light touch on both lower extremities.  Poor proprioception on both big toes.  Intact light touch in upper extremities.  Negative Tinel's.   Psychiatric:         Mood and Affect: Mood normal.         Behavior: Behavior normal.       Lab on 04/25/2023   Component Date Value Ref Range Status   • Total Cholesterol 04/25/2023 116  0 - 200 mg/dL Final    Comment: Cholesterol Reference Ranges  (U.S. Department of Health and Human Services ATP III  Classifications)  Desirable          <200 mg/dL  Borderline High    200-239 mg/dL  High Risk          >240 mg/dL  Triglyceride Reference Ranges  (U.S. Department of Health and Human Services ATP III  Classifications)  Normal           <150 mg/dL  Borderline High  150-199 mg/dL  High             200-499 mg/dL  Very High        >500 mg/dL  HDL Reference Ranges  (U.S. Department of Health and Human Services ATP III  Classifications)  Low     <40 mg/dl (major risk factor for CHD)  High    >60 mg/dl ('negative' risk factor for CHD)  LDL Reference Ranges  (U.S. Department of Health and Human Services ATP III  Classifications)  Optimal          <100 mg/dL  Near Optimal     100-129 mg/dL  Borderline High  130-159 mg/dL  High             160-189 mg/dL  Very High        >189 mg/dL     • Triglycerides  04/25/2023 59  0 - 150 mg/dL Final   • HDL Cholesterol 04/25/2023 40  40 - 60 mg/dL Final   • VLDL Cholesterol [place] 04/25/2023 13  5 - 40 mg/dL Final   • LDL Chol Calc (NIH) 04/25/2023 63  0 - 100 mg/dL Final   • Glucose 04/25/2023 343 (H)  65 - 99 mg/dL Final   • BUN 04/25/2023 15  8 - 23 mg/dL Final   • Creatinine 04/25/2023 0.90  0.57 - 1.00 mg/dL Final   • EGFR Result 04/25/2023 70.7  >60.0 mL/min/1.73 Final    Comment: GFR Normal >60  Chronic Kidney Disease <60  Kidney Failure <15     • BUN/Creatinine Ratio 04/25/2023 16.7  7.0 - 25.0 Final   • Sodium 04/25/2023 136  136 - 145 mmol/L Final   • Potassium 04/25/2023 5.0  3.5 - 5.2 mmol/L Final   • Chloride 04/25/2023 97 (L)  98 - 107 mmol/L Final   • Total CO2 04/25/2023 27.4  22.0 - 29.0 mmol/L Final   • Calcium 04/25/2023 9.3  8.6 - 10.5 mg/dL Final   • Total Protein 04/25/2023 7.6  6.0 - 8.5 g/dL Final   • Albumin 04/25/2023 4.0  3.5 - 5.2 g/dL Final   • Globulin 04/25/2023 3.6  gm/dL Final   • A/G Ratio 04/25/2023 1.1  g/dL Final   • Total Bilirubin 04/25/2023 0.6  0.0 - 1.2 mg/dL Final   • Alkaline Phosphatase 04/25/2023 112  39 - 117 U/L Final   • AST (SGOT) 04/25/2023 37 (H)  1 - 32 U/L Final   • ALT (SGPT) 04/25/2023 30  1 - 33 U/L Final   • Hemoglobin A1C 04/25/2023 10.40 (H)  4.80 - 5.60 % Final    Comment: Hemoglobin A1C Ranges:  Increased Risk for Diabetes  5.7% to 6.4%  Diabetes                     >= 6.5%  Diabetic Goal                < 7.0%     • Interpretation 04/25/2023 Note   Final    Supplemental report is available.   • Unable to Void 04/25/2023 Comment   Final    Comment: The patient was not able to render a urine sample and has been  instructed to return for a urine collection at their earliest  convenience.  The urine testing that you have requested has  been deleted from this report.  When the patient returns and  provides a urine specimen, the urine testing will be performed  and separately reported.       Assessment & Plan   Diagnoses and  all orders for this visit:    1. Type 1 diabetes mellitus with complications (Primary)  -     Comprehensive Metabolic Panel  -     Vitamin B12  -     Celiac Disease Panel  -     C-Peptide  -     Glutamic Acid Decarboxylase  -     Microalbumin / Creatinine Urine Ratio - Urine, Clean Catch    2. Insulin pump status    3. Insulin pump titration    4. Hyperlipidemia, unspecified hyperlipidemia type  -     TSH  -     T4, Free    5. Coronary artery disease involving native coronary artery of native heart without angina pectoris    6. Exocrine pancreatic insufficiency    7. Osteoporosis, postmenopausal  -     Vitamin D,25-Hydroxy      Changes basal rate at 12 AM to 5 AM to 0.55.  Change carbohydrate ratio to 1: 9 at 7 AM and 1: 12 at 9 PM.  Check C-peptide, OCTAVIANO antibody, urine microalbumin, celiac panel, and vitamin B12.  Suggest neurology consultation to look for reversible causes of peripheral neuropathy.  Continue atorvastatin 20 mg/day.  Continue mealtime Creon.  Will defer treatment of coronary artery disease to Dr. Bruner.  Will defer treatment of osteoporosis to Dr. Nolen.    Copy of my note sent to Dr. Lozano, Dr. Bruner, Dr. Diggs, and Dr. Nolen.    RTC 3 mos with JYOTSNA Jasso NP and with me in 6 mos.  Total time 69 minutes.

## 2023-05-09 NOTE — LETTER
May 9, 2023     Aleja Lozano MD  [address] [facility] [address]    Patient: Opal Gutierrez   YOB: 1955   Date of Visit: 5/9/2023       Dear Dr. Blake MD:    Thank you for referring Opal Gutierrez to me for evaluation. Below are the relevant portions of my assessment and plan of care.    If you have questions, please do not hesitate to call me. I look forward to following Opal along with you.         Sincerely,        Cam Devi MD        CC: MD David Robertson MD Jean Nusz, MD Yson, Cam TEMPLE MD  05/09/23 1646  Signed  Subjective    Opal Gutierrez is a 68 y.o. female.     History of Present Illness     Patient is a 66-year-old female who came in for follow-up.      She has known diabetes mellitus since her 20s and was started on insulin a few years later.  She has been using an insulin pump since 2014.  She is on Medtronic 770 G insulin pump with a continuous glucose sensor.  She is using Humalog insulin.    Hemoglobin A1c done in April 2023 is 10.4%.    Sensor data reviewed.  Time in target 67%.  Time above target 31%.  Time below target 01%.  Auto mode 90%.  Average blood glucose 188 mg per DL.    GMI 7.1%.  Total daily dose 56.4   bolus 27.4 units/day.  She has intermittent hypoglycemia in the early morning around 2 to 3 AM.  She has no hypoglycemia during the daytime.  She has postprandial hyperglycemia mostly after breakfast.    Pump settings:  Basal rate: 12 AM is 0.6.  5 AM is 0.7.     Carbohydrate ratio: 12 midnight is 34.  7 AM is 9.5.  9 PM is 23.      Sensitivity: 120.     Target: 100-150     Her last eye examination was 2022 and she has no retinopathy.  Urine microalbumin is normal in 2/22.  She has numbness in both feet and has difficulty with balance when she walks. She is getting neuropathy therapy from her chiropractor Dr. Schafer..     She has hyperlipidemia and is on atorvastatin 20 mg/day.  She denies myalgia.  Lipid  panel done in April 2023 are as follows: Cholesterol 116.  HDL 40.  LDL 63.  Triglycerides 59.    She had a liver biopsy done in November 2022 at [facility].  Pathology showed features consistent with partially regressed cirrhosis.  She is being followed by Dr. Diggs     She has a history of pancreatitis around 1995 thought to be due to alcohol use.  Diarrhea, oily film with stools and floating stools have resolved since she started taking Creon 2 tabs with each meal regularly.     She had bleeding gastric ulcer and is on Protonix prescribed by Dr. Diggs.  She had EGD and colonoscopy done by Dr. Diggs in 6/22.  She denies melena, hematochezia or hematemesis.    She was seen by neurologist Dr. Cheng in 2015 for numbness on her right hand.  EMG showed mixed sensorineural peripheral neuropathy.  An underlying mononeuropathies of the right ulnar or median nerve cannot be ruled out.  Hand numbness has resolved without intervention.  Dr. Cheng has left town since.    She has coronary artery disease and had angioplasty with stent to the LAD done by Dr. Bruner in October 2020.  She is on amiodarone 200 mg/day, Coreg 3.125 mg daily, furosemide 20 mg as needed, Plavix 75 mg daily, aspirin 81 mg daily and spironolactone 25 mg 1/2 tablet daily.  She denies chest pain or shortness of breath.      She has osteoporosis which is being managed by her gynecologist Dr. Nolen.  She has been receiving Reclast yearly.  She has received 3 or 4 doses.  She is on vit D 5000 u/day.  Her last bone density was in 2021 and was not done at [facility].    The following portions of the patient's history were reviewed and updated as appropriate: allergies, current medications, past family history, past medical history, past social history, past surgical history and problem list.    Review of Systems   Eyes: Negative for visual disturbance.   Respiratory: Negative for shortness of breath.    Cardiovascular: Negative for chest pain and palpitations.  "  Gastrointestinal: Negative for anal bleeding and blood in stool.   Endocrine: Negative for cold intolerance and heat intolerance.   Genitourinary: Negative.    Musculoskeletal: Negative for myalgias.   Neurological: Positive for numbness.     Vitals:    05/09/23 0921   BP: 100/62   Pulse: 76   Temp: 97.3 °F (36.3 °C)   SpO2: 97%   Weight: 60.6 kg (133 lb 9.6 oz)   Height: 170.2 cm (67.01\")      Objective    Physical Exam  Constitutional:       Appearance: Normal appearance.   Eyes:      General: No scleral icterus.        Right eye: No discharge.         Left eye: No discharge.   Neck:      Vascular: No carotid bruit.   Cardiovascular:      Rate and Rhythm: Normal rate and regular rhythm.      Pulses: Normal pulses.      Heart sounds: Normal heart sounds.   Pulmonary:      Breath sounds: Normal breath sounds.   Abdominal:      Palpations: Abdomen is soft.      Tenderness: There is no right CVA tenderness or left CVA tenderness.   Musculoskeletal:      Right lower leg: No edema.      Left lower leg: No edema.      Comments: Hammertoe deformities on both feet.  No plantar ulcers.   Lymphadenopathy:      Cervical: No cervical adenopathy.   Skin:     General: Skin is warm.   Neurological:      Mental Status: She is alert and oriented to person, place, and time.      Comments: Diminished light touch on both lower extremities.  Poor proprioception on both big toes.  Intact light touch in upper extremities.  Negative Tinel's.   Psychiatric:         Mood and Affect: Mood normal.         Behavior: Behavior normal.       Lab on 04/25/2023   Component Date Value Ref Range Status   • Total Cholesterol 04/25/2023 116  0 - 200 mg/dL Final    Comment: Cholesterol Reference Ranges  (U.S. Department of Health and Human Services ATP III  Classifications)  Desirable          <200 mg/dL  Borderline High    200-239 mg/dL  High Risk          >240 mg/dL  Triglyceride Reference Ranges  (U.S. Department of Health and Human Services ATP " III  Classifications)  Normal           <150 mg/dL  Borderline High  150-199 mg/dL  High             200-499 mg/dL  Very High        >500 mg/dL  HDL Reference Ranges  (U.S. Department of Health and Human Services ATP III  Classifications)  Low     <40 mg/dl (major risk factor for CHD)  High    >60 mg/dl ('negative' risk factor for CHD)  LDL Reference Ranges  (U.S. Department of Health and Human Services ATP III  Classifications)  Optimal          <100 mg/dL  Near Optimal     100-129 mg/dL  Borderline High  130-159 mg/dL  High             160-189 mg/dL  Very High        >189 mg/dL     • Triglycerides 04/25/2023 59  0 - 150 mg/dL Final   • HDL Cholesterol 04/25/2023 40  40 - 60 mg/dL Final   • VLDL Cholesterol Orlando 04/25/2023 13  5 - 40 mg/dL Final   • LDL Chol Calc (NIH) 04/25/2023 63  0 - 100 mg/dL Final   • Glucose 04/25/2023 343 (H)  65 - 99 mg/dL Final   • BUN 04/25/2023 15  8 - 23 mg/dL Final   • Creatinine 04/25/2023 0.90  0.57 - 1.00 mg/dL Final   • EGFR Result 04/25/2023 70.7  >60.0 mL/min/1.73 Final    Comment: GFR Normal >60  Chronic Kidney Disease <60  Kidney Failure <15     • BUN/Creatinine Ratio 04/25/2023 16.7  7.0 - 25.0 Final   • Sodium 04/25/2023 136  136 - 145 mmol/L Final   • Potassium 04/25/2023 5.0  3.5 - 5.2 mmol/L Final   • Chloride 04/25/2023 97 (L)  98 - 107 mmol/L Final   • Total CO2 04/25/2023 27.4  22.0 - 29.0 mmol/L Final   • Calcium 04/25/2023 9.3  8.6 - 10.5 mg/dL Final   • Total Protein 04/25/2023 7.6  6.0 - 8.5 g/dL Final   • Albumin 04/25/2023 4.0  3.5 - 5.2 g/dL Final   • Globulin 04/25/2023 3.6  gm/dL Final   • A/G Ratio 04/25/2023 1.1  g/dL Final   • Total Bilirubin 04/25/2023 0.6  0.0 - 1.2 mg/dL Final   • Alkaline Phosphatase 04/25/2023 112  39 - 117 U/L Final   • AST (SGOT) 04/25/2023 37 (H)  1 - 32 U/L Final   • ALT (SGPT) 04/25/2023 30  1 - 33 U/L Final   • Hemoglobin A1C 04/25/2023 10.40 (H)  4.80 - 5.60 % Final    Comment: Hemoglobin A1C Ranges:  Increased Risk for Diabetes   5.7% to 6.4%  Diabetes                     >= 6.5%  Diabetic Goal                < 7.0%     • Interpretation 04/25/2023 Note   Final    Supplemental report is available.   • Unable to Void 04/25/2023 Comment   Final    Comment: The patient was not able to render a urine sample and has been  instructed to return for a urine collection at their earliest  convenience.  The urine testing that you have requested has  been deleted from this report.  When the patient returns and  provides a urine specimen, the urine testing will be performed  and separately reported.       Assessment & Plan   Diagnoses and all orders for this visit:    1. Type 1 diabetes mellitus with complications (Primary)  -     Comprehensive Metabolic Panel  -     Vitamin B12  -     Celiac Disease Panel  -     C-Peptide  -     Glutamic Acid Decarboxylase  -     Microalbumin / Creatinine Urine Ratio - Urine, Clean Catch    2. Insulin pump status    3. Insulin pump titration    4. Hyperlipidemia, unspecified hyperlipidemia type  -     TSH  -     T4, Free    5. Coronary artery disease involving native coronary artery of native heart without angina pectoris    6. Exocrine pancreatic insufficiency    7. Osteoporosis, postmenopausal  -     Vitamin D,25-Hydroxy      Changes basal rate at 12 AM to 5 AM to 0.55.  Change carbohydrate ratio to 1: 9 at 7 AM and 1: 12 at 9 PM.  Check C-peptide, OCTAVIANO antibody, urine microalbumin, celiac panel, and vitamin B12.  Suggest neurology consultation to look for reversible causes of peripheral neuropathy.  Continue atorvastatin 20 mg/day.  Continue mealtime Creon.  Will defer treatment of coronary artery disease to Dr. Bruner.  Will defer treatment of osteoporosis to Dr. Nolen.    Copy of my note sent to Dr. Lozano, Dr. Bruner, Dr. Diggs, and Dr. Nolen.    RTC 3 mos with JYOTSNA Jasso NP and with me in 6 mos.  Total time 69 minutes.

## 2023-05-15 LAB
25(OH)D3+25(OH)D2 SERPL-MCNC: 27 NG/ML (ref 30–100)
ALBUMIN SERPL-MCNC: 4 G/DL (ref 3.8–4.8)
ALBUMIN/CREAT UR: 4 MG/G CREAT (ref 0–29)
ALBUMIN/GLOB SERPL: 1.1 {RATIO} (ref 1.2–2.2)
ALP SERPL-CCNC: 154 IU/L (ref 44–121)
ALT SERPL-CCNC: 35 IU/L (ref 0–32)
AST SERPL-CCNC: 35 IU/L (ref 0–40)
BILIRUB SERPL-MCNC: 0.5 MG/DL (ref 0–1.2)
BUN SERPL-MCNC: 12 MG/DL (ref 8–27)
BUN/CREAT SERPL: 14 (ref 12–28)
C PEPTIDE SERPL-MCNC: <0.1 NG/ML (ref 1.1–4.4)
CALCIUM SERPL-MCNC: 9.2 MG/DL (ref 8.7–10.3)
CHLORIDE SERPL-SCNC: 95 MMOL/L (ref 96–106)
CO2 SERPL-SCNC: 24 MMOL/L (ref 20–29)
CREAT SERPL-MCNC: 0.84 MG/DL (ref 0.57–1)
CREAT UR-MCNC: 92.6 MG/DL
EGFRCR SERPLBLD CKD-EPI 2021: 77 ML/MIN/1.73
ENDOMYSIUM IGA SER QL: NEGATIVE
GAD65 AB SER IA-ACNC: 13.2 U/ML (ref 0–5)
GLOBULIN SER CALC-MCNC: 3.7 G/DL (ref 1.5–4.5)
GLUCOSE SERPL-MCNC: 150 MG/DL (ref 70–99)
IGA SERPL-MCNC: 246 MG/DL (ref 87–352)
MICROALBUMIN UR-MCNC: 3.3 UG/ML
POTASSIUM SERPL-SCNC: 3.9 MMOL/L (ref 3.5–5.2)
PROT SERPL-MCNC: 7.7 G/DL (ref 6–8.5)
SODIUM SERPL-SCNC: 134 MMOL/L (ref 134–144)
T4 FREE SERPL-MCNC: 1.34 NG/DL (ref 0.82–1.77)
TSH SERPL DL<=0.005 MIU/L-ACNC: 2.42 UIU/ML (ref 0.45–4.5)
TTG IGA SER-ACNC: <2 U/ML (ref 0–3)
VIT B12 SERPL-MCNC: 961 PG/ML (ref 232–1245)

## 2023-05-16 DIAGNOSIS — E78.5 HYPERLIPIDEMIA, UNSPECIFIED HYPERLIPIDEMIA TYPE: ICD-10-CM

## 2023-05-16 DIAGNOSIS — E55.9 VITAMIN D DEFICIENCY: ICD-10-CM

## 2023-05-16 DIAGNOSIS — M81.0 OSTEOPOROSIS, POSTMENOPAUSAL: ICD-10-CM

## 2023-05-16 DIAGNOSIS — E10.8 TYPE 1 DIABETES MELLITUS WITH COMPLICATIONS: Primary | ICD-10-CM

## 2023-05-16 PROBLEM — R76.8 POSITIVE GAD ANTIBODY: Status: ACTIVE | Noted: 2023-05-16

## 2023-05-22 ENCOUNTER — TELEPHONE (OUTPATIENT)
Dept: ENDOCRINOLOGY | Age: 68
End: 2023-05-22

## 2023-05-22 NOTE — TELEPHONE ENCOUNTER
"Caller: Opal Gutierrez \"Jes\"    Relationship to patient: Self    Best call back number: [phone]    Patient is needing: PATIENT STATES DR LOMBARDI WROTE A PRESCRIPTION TO [facility] FOR THEIR INSULIN REFILL AND [facility] HAS REACHED OUT TO DR LOMBARDI SO THEY CAN REFILL THE INSULIN BUT HAS BEEN UNABLE TO REACH HIM. PATIENT PROVIDED THE INFORMATION TO [facility] [phone]. IF YOU HAVE ANY FURTHER QUESTIONS PLEASE REACH OUT TO THE PATIENT.        "

## 2023-05-23 RX ORDER — INSULIN LISPRO 100 [IU]/ML
INJECTION, SOLUTION INTRAVENOUS; SUBCUTANEOUS
Qty: 70 ML | Refills: 2 | Status: SHIPPED | OUTPATIENT
Start: 2023-05-23

## 2023-06-16 RX ORDER — TRAZODONE HYDROCHLORIDE 150 MG/1
TABLET ORAL
Qty: 90 TABLET | Refills: 0 | Status: SHIPPED | OUTPATIENT
Start: 2023-06-16

## 2023-06-19 RX ORDER — INSULIN LISPRO 100 [IU]/ML
INJECTION, SOLUTION INTRAVENOUS; SUBCUTANEOUS
Qty: 30 ML | OUTPATIENT
Start: 2023-06-19

## 2023-06-19 RX ORDER — INSULIN LISPRO 100 [IU]/ML
INJECTION, SOLUTION INTRAVENOUS; SUBCUTANEOUS
Qty: 63 ML | Refills: 2 | Status: SHIPPED | OUTPATIENT
Start: 2023-06-19

## 2023-08-04 ENCOUNTER — OFFICE VISIT (OUTPATIENT)
Dept: FAMILY MEDICINE CLINIC | Facility: CLINIC | Age: 68
End: 2023-08-04
Payer: MEDICARE

## 2023-08-04 VITALS
DIASTOLIC BLOOD PRESSURE: 76 MMHG | HEART RATE: 83 BPM | WEIGHT: 126.8 LBS | OXYGEN SATURATION: 100 % | BODY MASS INDEX: 19.9 KG/M2 | HEIGHT: 67 IN | SYSTOLIC BLOOD PRESSURE: 131 MMHG

## 2023-08-04 DIAGNOSIS — M81.0 SENILE OSTEOPOROSIS: ICD-10-CM

## 2023-08-04 DIAGNOSIS — Z87.81 HISTORY OF HIP FRACTURE: ICD-10-CM

## 2023-08-04 DIAGNOSIS — M79.604 RIGHT LEG PAIN: Primary | ICD-10-CM

## 2023-08-04 PROBLEM — N13.2 URETERAL STONE WITH HYDRONEPHROSIS: Status: ACTIVE | Noted: 2021-01-03

## 2023-08-04 PROBLEM — I25.5 ISCHEMIC CARDIOMYOPATHY: Status: ACTIVE | Noted: 2021-01-03

## 2023-08-04 PROBLEM — Z86.010 HISTORY OF COLONIC POLYPS: Status: ACTIVE | Noted: 2023-05-04

## 2023-08-04 PROBLEM — R09.89 LEFT CAROTID BRUIT: Status: ACTIVE | Noted: 2021-01-08

## 2023-08-04 PROBLEM — E10.65 UNCONTROLLED TYPE 1 DIABETES MELLITUS WITH HYPERGLYCEMIA: Status: ACTIVE | Noted: 2023-04-04

## 2023-08-04 PROBLEM — R00.0 WIDE-COMPLEX TACHYCARDIA: Status: ACTIVE | Noted: 2021-01-08

## 2023-08-04 PROBLEM — K92.2 ACUTE UPPER GI BLEED: Status: ACTIVE | Noted: 2021-01-03

## 2023-08-04 PROBLEM — E11.9 TYPE 2 DIABETES MELLITUS WITH HEMOGLOBIN A1C GOAL OF LESS THAN 7.0%: Status: ACTIVE | Noted: 2021-01-03

## 2023-08-04 PROBLEM — Z86.0100 HISTORY OF COLONIC POLYPS: Status: ACTIVE | Noted: 2023-05-04

## 2023-08-04 PROBLEM — K22.9 ESOPHAGEAL ABNORMALITY: Status: ACTIVE | Noted: 2023-08-04

## 2023-08-04 PROBLEM — G93.41 ACUTE METABOLIC ENCEPHALOPATHY: Status: ACTIVE | Noted: 2021-05-07

## 2023-08-04 PROBLEM — G93.41 ACUTE METABOLIC ENCEPHALOPATHY: Status: RESOLVED | Noted: 2021-05-07 | Resolved: 2023-08-04

## 2023-08-04 PROBLEM — Z95.5 PRESENCE OF STENT IN CORONARY ARTERY: Status: ACTIVE | Noted: 2021-01-03

## 2023-08-04 PROBLEM — N17.9 AKI (ACUTE KIDNEY INJURY): Status: ACTIVE | Noted: 2021-05-06

## 2023-08-04 PROBLEM — E11.11 DIABETIC KETOACIDOSIS WITH COMA ASSOCIATED WITH TYPE 2 DIABETES MELLITUS: Status: ACTIVE | Noted: 2021-01-03

## 2023-08-08 ENCOUNTER — TELEPHONE (OUTPATIENT)
Dept: FAMILY MEDICINE CLINIC | Facility: CLINIC | Age: 68
End: 2023-08-08
Payer: MEDICARE

## 2023-08-09 ENCOUNTER — TELEPHONE (OUTPATIENT)
Dept: FAMILY MEDICINE CLINIC | Facility: CLINIC | Age: 68
End: 2023-08-09
Payer: MEDICARE

## 2023-08-09 DIAGNOSIS — M79.604 RIGHT LEG PAIN: Primary | ICD-10-CM

## 2023-08-09 NOTE — TELEPHONE ENCOUNTER
"  Caller: Opal Gutierrez \"Jes\"    Relationship: Self    Best call back number: [phone]    What was the call regarding: PATIENT STATES THAT SHE HAD AN X RAY DONE AND SHE WOULD LIKE TO KNOW IF DR. BROOKS HAS RECEIVED THESE RESULTS. SHE WOULD ALSO LIKE TO KNOW NEXT STEPS FOR CARE.           "

## 2023-08-11 ENCOUNTER — TELEPHONE (OUTPATIENT)
Dept: FAMILY MEDICINE CLINIC | Facility: CLINIC | Age: 68
End: 2023-08-11

## 2023-08-11 ENCOUNTER — OFFICE VISIT (OUTPATIENT)
Dept: ENDOCRINOLOGY | Age: 68
End: 2023-08-11
Payer: MEDICARE

## 2023-08-11 VITALS
HEIGHT: 67 IN | TEMPERATURE: 97.1 F | SYSTOLIC BLOOD PRESSURE: 128 MMHG | DIASTOLIC BLOOD PRESSURE: 60 MMHG | BODY MASS INDEX: 19.93 KG/M2 | WEIGHT: 127 LBS | HEART RATE: 54 BPM | OXYGEN SATURATION: 98 %

## 2023-08-11 DIAGNOSIS — I10 BENIGN HYPERTENSION: ICD-10-CM

## 2023-08-11 DIAGNOSIS — E10.40 TYPE 1 DIABETES MELLITUS WITH DIABETIC NEUROPATHY: Primary | ICD-10-CM

## 2023-08-11 DIAGNOSIS — E78.5 HYPERLIPIDEMIA LDL GOAL <70: ICD-10-CM

## 2023-08-11 NOTE — TELEPHONE ENCOUNTER
"Caller: Opal Gutierrez \"Jes\"    Relationship: Self    Best call back number: [phone]     Who are you requesting to speak with (clinical staff, provider,  specific staff member): DR BROOKS OR MA    What was the call regarding: PATIENT STATES THAT SHE RECEIVED THE RESULTS OF HER X-RAY WHICH SHOWED NO SIGNS OF FRACTURE, BUT SHE IS STILL EXPERIENCING LEG PAIN. REQUESTS A CALL BACK TO DISCUSS FURTHER TREATMENT OPTIONS    "

## 2023-08-11 NOTE — TELEPHONE ENCOUNTER
This is a duplicate message and I sent the response to you already.  An MRI order has been placed in the system.

## 2023-08-11 NOTE — PROGRESS NOTES
Chief Complaint  Chief Complaint   Patient presents with    Diabetes     Type 1 dm       Subjective          History of Present Illness    Opal Gutierrez 68 y.o. presents for a follow-up evaluation for type 1 DM     She has been diabetic since her 20s and started insulin shortly after.  She has been using an insulin pump since 2014     Pt is on the following medications for their DM:  Humalog via Medtronic 770 G insulin pump with a continuous glucose sensor        Pump Settings     Basal rate: 12 AM is 0.55                       5 AM is 0.7        Carbohydrate ratio: 12 midnight is 34.                                     7 AM is 9.0                                     9 PM is 12.         Sensitivity: 120.        Target: 100-150              Pt complains of numbness and tingling in feet     Denies diarrhea, constipation, chest pain, shortness of breath and vision changes    Pt does not have a history of DM retinopathy.  Last eye exam was 03/23     Pt does not have a history of nephropathy.  Patient is not currently taking ACE/ARB     Pt does have neuropathy.  She is getting neuropathy therapy from her chiropractor.     Pt does have a history of CAD and had angioplasty with stent to the LAD done by Dr. Bruner in October 2020    Last A1C in 04/23 was 10.40    Last microalbumin in 05/23 was negative          Blood Sugars    Blood glucoses are checked 4-5/day.    Fasting blood glucoses: mid to high 100s    Pre-meal blood glucoses: mid 100s to 200s    Pt has no episodes of hypoglycemia.          Sensor Data    Time in range 53%  High 35%  Very high 11%  Low 1%  Very low 0%      Average Glucose - 208 mg/dL      Sensor Wear - 102 %        Time in Auto Mode - 0 %  Time in Manual Mode - 100 %              Hyperlipidemia     Pt denies any muscle/body aches, chest pain or shortness of breath    Pt is currently taking atorvastatin 20 mg     Last lipid panel in 04/23 showed Total 116, HDL 40, LDL 63 and Triglycerides  "59            Hypertension    Pt has Ischemic Cardiomyopathy     Pt denies any chest pain, palpitations, shortness of breath, or headache     Current regimen includes amiodarone 200 mg daily, carvedilol  3.125 mg BID, furosemide 20 mg PRN and spironolactone 25 mg 1/2 tablet daily.              Other History     She has a history of pancreatitis around 1995 thought to be due to alcohol use        She has osteoporosis which is being managed by her gynecologist Dr. Nolen        Pt had liver biopsy 11/22 which showed partially regressed cirrhosis               I have reviewed the patient's allergies, medicines, past medical hx, family hx and social hx.    Objective   Vital Signs:   /60   Pulse 54   Temp 97.1 øF (36.2 øC)   Ht 170.2 cm (67.01\")   Wt 57.6 kg (127 lb)   SpO2 98%   BMI 19.89 kg/mý       Physical Exam   Physical Exam  Constitutional:       General: She is not in acute distress.     Appearance: Normal appearance. She is not diaphoretic.   HENT:      Head: Normocephalic and atraumatic.   Eyes:      General:         Right eye: No discharge.         Left eye: No discharge.   Skin:     General: Skin is warm and dry.   Neurological:      Mental Status: She is alert.   Psychiatric:         Mood and Affect: Mood normal.         Behavior: Behavior normal.                  Results Review:   Hemoglobin A1C   Date Value Ref Range Status   04/25/2023 10.40 (H) 4.80 - 5.60 % Final     Comment:     Hemoglobin A1C Ranges:  Increased Risk for Diabetes  5.7% to 6.4%  Diabetes                     >= 6.5%  Diabetic Goal                < 7.0%     04/05/2023 9.7 (H) 4.3 - 5.6 % Final     Comment:     (note)  A1C% Reference Range:  4.3 - 5.6  Normal range  5.7 - 6.4  Pre-diabetic -increased risk for developing diabetes  mellitus.  >=6.5      Diabetic -diagnostic of diabetes mellitus.    Note: For diagnosis of diabetes in individuals without unequivocal  hyperglycemia, results should be confirmed by repeat " testing.  Patients with conditions that shorten erythrocyte survival, such as  recovery from acute blood loss, hemolytic anemia, kidney disease,  or the presence of unstable hemogloblins like HbSS, HbCC, and HbSC  may yield falsely decreased HbA1c test results. Iron deficiency may  yield falsely increased HbA1c test results.     Triglycerides   Date Value Ref Range Status   04/25/2023 59 0 - 150 mg/dL Final     HDL Cholesterol   Date Value Ref Range Status   04/25/2023 40 40 - 60 mg/dL Final     LDL Chol Calc (NIH)   Date Value Ref Range Status   04/25/2023 63 0 - 100 mg/dL Final     VLDL Cholesterol Orlando   Date Value Ref Range Status   04/25/2023 13 5 - 40 mg/dL Final         Assessment and Plan {CC Problem List  Visit Diagnosis  ROS  Review (Popup)  Health Maintenance  Quality  BestPractice  Medications  SmartSets  SnapShot Encounters  Media :23  Diagnoses and all orders for this visit:    1. Type 1 diabetes mellitus with diabetic neuropathy (Primary)  -     Hemoglobin A1c  -     Comprehensive Metabolic Panel    She was out of automode again and has been in it.  Reviewed/showed how to get into [employer] and how to get back in if she is kicked out.  She was putting in fake carbs to get her blood sugars down - advised not to do that and explained why.  Continue with Humalog via Medtronic 770 G insulin pump with a continuous glucose sensor with changes above  Up grade to 780G  Continue with BG checks  Check labs today      2. Hyperlipidemia LDL goal <70  -     Comprehensive Metabolic Panel  -     Lipid Panel    Check labs today  Continue with statin      3. Benign hypertension  -     Comprehensive Metabolic Panel    Stable  Continue with current medication regimen  Defer management to PCP            Labs today  RTC on 11/17/23 with Dr. Devi      Follow Up     Patient was given instructions and counseling regarding her condition or for health maintenance advice. Please see specific information pulled into  the AVS if appropriate.              Bertha Jasso, APRN  08/11/23

## 2023-08-12 LAB
ALBUMIN SERPL-MCNC: 4.1 G/DL (ref 3.5–5.2)
ALBUMIN/GLOB SERPL: 1.2 G/DL
ALP SERPL-CCNC: 128 U/L (ref 39–117)
ALT SERPL-CCNC: 48 U/L (ref 1–33)
AST SERPL-CCNC: 48 U/L (ref 1–32)
BILIRUB SERPL-MCNC: 0.6 MG/DL (ref 0–1.2)
BUN SERPL-MCNC: 11 MG/DL (ref 8–23)
BUN/CREAT SERPL: 14.7 (ref 7–25)
CALCIUM SERPL-MCNC: 9.6 MG/DL (ref 8.6–10.5)
CHLORIDE SERPL-SCNC: 99 MMOL/L (ref 98–107)
CHOLEST SERPL-MCNC: 116 MG/DL (ref 0–200)
CO2 SERPL-SCNC: 27.9 MMOL/L (ref 22–29)
CREAT SERPL-MCNC: 0.75 MG/DL (ref 0.57–1)
EGFRCR SERPLBLD CKD-EPI 2021: 87.9 ML/MIN/1.73
GLOBULIN SER CALC-MCNC: 3.5 GM/DL
GLUCOSE SERPL-MCNC: 158 MG/DL (ref 65–99)
HBA1C MFR BLD: 10.3 % (ref 4.8–5.6)
HDLC SERPL-MCNC: 44 MG/DL (ref 40–60)
IMP & REVIEW OF LAB RESULTS: NORMAL
LDLC SERPL CALC-MCNC: 60 MG/DL (ref 0–100)
POTASSIUM SERPL-SCNC: 4.8 MMOL/L (ref 3.5–5.2)
PROT SERPL-MCNC: 7.6 G/DL (ref 6–8.5)
SODIUM SERPL-SCNC: 137 MMOL/L (ref 136–145)
TRIGL SERPL-MCNC: 53 MG/DL (ref 0–150)
VLDLC SERPL CALC-MCNC: 12 MG/DL (ref 5–40)

## 2023-08-15 ENCOUNTER — TELEPHONE (OUTPATIENT)
Dept: ENDOCRINOLOGY | Age: 68
End: 2023-08-15
Payer: MEDICARE

## 2023-08-15 NOTE — TELEPHONE ENCOUNTER
Okay for hub to read  A1c is 10.3% - please try to stay in automode (blue shield) and let me know if you don't hear anything about up grading to 780    Kidney function is good.  Liver enzymes slightly elevated    Lipid panel is good.  Continue with statin.

## 2023-08-18 ENCOUNTER — TELEPHONE (OUTPATIENT)
Dept: FAMILY MEDICINE CLINIC | Facility: CLINIC | Age: 68
End: 2023-08-18

## 2023-08-18 NOTE — TELEPHONE ENCOUNTER
"Caller: Opal Gutierrez \"Jes\"    Relationship to patient: Self    Best call back number: [phone]    PATIENT IS REQUESTING AN UPDATE ON THE MRI ORDERED FOR HER LEG. PATIENT STATES SHE HAS NOT BEEN CONTACTED TO SCHEDULE.     "

## 2023-08-24 NOTE — TELEPHONE ENCOUNTER
"  Caller: Opal Gutierrez \"Jes\"    Relationship: Self    Best call back number: [phone]     What was the call regarding: PATIENT CALLED WANTING TO CHECK ON STATUS OF HER REFERRAL PLEASE CALL AND ADVISE    "

## 2023-08-26 ENCOUNTER — HOSPITAL ENCOUNTER (OUTPATIENT)
Dept: MRI IMAGING | Facility: HOSPITAL | Age: 68
Discharge: HOME | End: 2023-08-26
Admitting: FAMILY MEDICINE
Payer: MEDICARE

## 2023-08-26 DIAGNOSIS — M79.604 RIGHT LEG PAIN: ICD-10-CM

## 2023-08-26 PROCEDURE — 0 GADOBENATE DIMEGLUMINE 529 MG/ML SOLUTION: Performed by: FAMILY MEDICINE

## 2023-08-26 PROCEDURE — 73720 MRI LWR EXTREMITY W/O&W/DYE: CPT

## 2023-08-26 PROCEDURE — A9577 INJ MULTIHANCE: HCPCS | Performed by: FAMILY MEDICINE

## 2023-08-26 RX ADMIN — GADOBENATE DIMEGLUMINE 12 ML: 529 INJECTION, SOLUTION INTRAVENOUS at 14:18

## 2023-08-29 ENCOUNTER — PATIENT MESSAGE (OUTPATIENT)
Dept: FAMILY MEDICINE CLINIC | Facility: CLINIC | Age: 68
End: 2023-08-29

## 2023-08-29 DIAGNOSIS — M79.604 RIGHT LEG PAIN: Primary | ICD-10-CM

## 2023-09-10 RX ORDER — TRAZODONE HYDROCHLORIDE 150 MG/1
TABLET ORAL
Qty: 90 TABLET | Refills: 0 | Status: SHIPPED | OUTPATIENT
Start: 2023-09-10

## 2023-09-26 RX ORDER — FLUOXETINE HYDROCHLORIDE 20 MG/1
CAPSULE ORAL
Qty: 30 CAPSULE | Refills: 6 | Status: SHIPPED | OUTPATIENT
Start: 2023-09-26

## 2023-09-26 NOTE — TELEPHONE ENCOUNTER
Rx Refill Note  Requested Prescriptions     Pending Prescriptions Disp Refills    FLUoxetine (PROzac) 20 MG capsule [Pharmacy Med Name: FLUOXETINE 20MG CAPSULES] 30 capsule      Sig: TAKE 1 CAPSULE BY MOUTH EVERY DAY      Last office visit with prescribing clinician: 8/4/2023   Next office visit with prescribing clinician: 10/24/2023     Beatriz Nieto MA  09/26/23, 14:32 EDT

## 2023-10-13 DIAGNOSIS — M79.604 RIGHT LEG PAIN: Primary | ICD-10-CM

## 2023-10-13 DIAGNOSIS — E10.40 TYPE 1 DIABETES MELLITUS WITH DIABETIC NEUROPATHY: ICD-10-CM

## 2023-10-13 DIAGNOSIS — E11.59 TYPE 2 DIABETES MELLITUS WITH OTHER CIRCULATORY COMPLICATIONS: ICD-10-CM

## 2023-10-13 DIAGNOSIS — I25.10 CORONARY ARTERY DISEASE INVOLVING NATIVE CORONARY ARTERY OF NATIVE HEART WITHOUT ANGINA PECTORIS: ICD-10-CM

## 2023-10-24 ENCOUNTER — OFFICE VISIT (OUTPATIENT)
Dept: FAMILY MEDICINE CLINIC | Facility: CLINIC | Age: 68
End: 2023-10-24
Payer: MEDICARE

## 2023-10-24 VITALS
WEIGHT: 130 LBS | DIASTOLIC BLOOD PRESSURE: 77 MMHG | HEIGHT: 67 IN | OXYGEN SATURATION: 95 % | SYSTOLIC BLOOD PRESSURE: 125 MMHG | BODY MASS INDEX: 20.4 KG/M2 | HEART RATE: 71 BPM

## 2023-10-24 DIAGNOSIS — M81.0 SENILE OSTEOPOROSIS: ICD-10-CM

## 2023-10-24 DIAGNOSIS — E10.40 TYPE 1 DIABETES MELLITUS WITH DIABETIC NEUROPATHY: ICD-10-CM

## 2023-10-24 DIAGNOSIS — M62.551 MUSCLE WASTING AND ATROPHY, NOT ELSEWHERE CLASSIFIED, RIGHT THIGH: ICD-10-CM

## 2023-10-24 DIAGNOSIS — M79.604 RIGHT LEG PAIN: Primary | ICD-10-CM

## 2023-10-24 PROBLEM — M62.561 MUSCLE WASTING AND ATROPHY, NOT ELSEWHERE CLASSIFIED, RIGHT LOWER LEG: Status: ACTIVE | Noted: 2023-10-24

## 2023-10-24 PROCEDURE — 3046F HEMOGLOBIN A1C LEVEL >9.0%: CPT | Performed by: FAMILY MEDICINE

## 2023-10-24 PROCEDURE — 3074F SYST BP LT 130 MM HG: CPT | Performed by: FAMILY MEDICINE

## 2023-10-24 PROCEDURE — 99214 OFFICE O/P EST MOD 30 MIN: CPT | Performed by: FAMILY MEDICINE

## 2023-10-24 PROCEDURE — 3078F DIAST BP <80 MM HG: CPT | Performed by: FAMILY MEDICINE

## 2023-10-24 RX ORDER — AMITRIPTYLINE HYDROCHLORIDE 10 MG/1
10 TABLET, FILM COATED ORAL NIGHTLY
Qty: 30 TABLET | Refills: 2 | Status: SHIPPED | OUTPATIENT
Start: 2023-10-24

## 2023-10-24 NOTE — ASSESSMENT & PLAN NOTE
This is managed by Dr. Devi and certainly complicates her picture because it increases her risk of vascular disease.

## 2023-10-24 NOTE — PATIENT INSTRUCTIONS
It is taking a long time to get these tests done and now with visible signs of muscle loss in your thigh, I'm going to get an MRI of your lumbar spine.  The nerve study and blood flow tests are still worthwhile so we aren't cancelling anything.    I added amitriptyline to try to help with the pain you're dealing with.    Don't take trazodone for sleep right now but if you don't like the amitriptyline you can resume Trazodone.

## 2023-10-24 NOTE — PROGRESS NOTES
"Subjective     Opal Gutierrez is a 68 y.o. female who presents with   Chief Complaint   Patient presents with    Med Refill       History of Present Illness     She has been having pain in her right leg for about the past 4 months.  It is on the same side as she had undergone a right hip replacement in the past. She has had an x-ray and MRI of the right femur and hip as well as had an ankle-brachial index and nerve conduction study ordered.  These tests are still quite away in the future and she is concerned because she is losing strength.  Theright leg pain above the right knee.  It's a throbbing pain.   The leg feels heavy and gave out on her and she fell.  She noticing her thigh is getting thinner as well.      She had done physical therapy at the beginning of this but did not feel ready and asking about doing that now.  She has been using a walker to feel more secure.  The pain is significant but the stability is her larger concern.  She would like some sort of medication to help her with the discomfort that she is feeling.  She is a diabetic with neuropathy and she already uses trazodone for sleep.  She also has osteoporosis and is on Reclast.    Her diabetes is managed by Dr. Devi.  She also has coronary artery disease and cardiomyopathy.             Review of Systems     Objective     /77 (BP Location: Left arm, Patient Position: Sitting, Cuff Size: Adult)   Pulse 71   Ht 170.2 cm (67.01\")   Wt 59 kg (130 lb)   SpO2 95%   BMI 20.36 kg/m²     Physical Exam  Constitutional:       Appearance: Normal appearance.   Musculoskeletal:         General: Deformity (Atrophy of the quadricep muscle on the right compared to the left) present.   Neurological:      Mental Status: She is alert.   Psychiatric:         Behavior: Behavior normal.         Thought Content: Thought content normal.         Procedures     Assessment & Plan   Diagnoses and all orders for this visit:    1. Right leg pain (Primary)  -     " MRI Lumbar Spine Without Contrast; Future  -     amitriptyline (ELAVIL) 10 MG tablet; Take 1 tablet by mouth Every Night.  Dispense: 30 tablet; Refill: 2  -     Cancel: Ambulatory Referral to Physical Therapy Evaluate and treat  -     Ambulatory Referral to Physical Therapy Evaluate and treat; Strengthening    2. Muscle wasting and atrophy, not elsewhere classified, right thigh  -     MRI Lumbar Spine Without Contrast; Future  -     Cancel: Ambulatory Referral to Physical Therapy Evaluate and treat  -     Ambulatory Referral to Physical Therapy Evaluate and treat; Strengthening    3. Type 1 diabetes mellitus with diabetic neuropathy  Assessment & Plan:  This is managed by Dr. Devi and certainly complicates her picture because it increases her risk of vascular disease.      4. Senile osteoporosis  Assessment & Plan:  On IV Reclast with prior history of fracture to the right hip           Discussion    Patient Instructions   It is taking a long time to get these tests done and now with visible signs of muscle loss in your thigh, I'm going to get an MRI of your lumbar spine.  The nerve study and blood flow tests are still worthwhile so we aren't cancelling anything.    I added amitriptyline to try to help with the pain you're dealing with.    Don't take trazodone for sleep right now but if you don't like the amitriptyline you can resume Trazodone.               Aleja Lozano MD

## 2023-11-01 ENCOUNTER — HOSPITAL ENCOUNTER (OUTPATIENT)
Dept: INFUSION THERAPY | Facility: HOSPITAL | Age: 68
Discharge: HOME | End: 2023-11-01
Admitting: PSYCHIATRY & NEUROLOGY
Payer: MEDICARE

## 2023-11-01 DIAGNOSIS — M79.604 RIGHT LEG PAIN: ICD-10-CM

## 2023-11-01 PROCEDURE — 95909 NRV CNDJ TST 5-6 STUDIES: CPT

## 2023-11-01 PROCEDURE — 95886 MUSC TEST DONE W/N TEST COMP: CPT

## 2023-11-01 NOTE — PROCEDURES
EMG and Nerve Conduction Studies    I.      Instrument used: Neuromax 1002  II.     Please see data sheets for tabular summary of NCS and details on methods, temperatures and lab standards.   III.    EMG muscles tested for upper extremity studies include the deltoid, biceps, triceps, pronator teres, extensor digitorum communis, first dorsal interosseous and abductor pollicis brevis.    IV.   EMG muscles tested for lower extremity studies include the vastus lateralis, tibialis anterior, peroneus longus, medial gastrocnemius and extensor digitorum brevis.    V.    Additional muscles tested as needed.  Paraspinal muscles tested as needed.   VI.   Please see data sheets for tabular summary of EMG findings.   VII. The complete report includes the data sheets.      Indication: Pain right anterior thigh with knee giving out  History: 68-year-old woman with 40-year history of diabetes with recent hemoglobin A1c 10.3% consistent with poor control who developed right thigh pain mostly anteriorly with weakness of the thigh with the right knee buckling.  She has several year history of numbness in the feet and lower legs consistent with diabetic peripheral neuropathy.  By observation there is notable atrophy in the right quadriceps      Ht: 68 inches  Wt: 130 pounds  HbA1C:   Lab Results   Component Value Date    HGBA1C 10.30 (H) 08/11/2023     TSH:   Lab Results   Component Value Date    TSH 2.420 05/09/2023       Technical summary:  Nerve conduction studies were obtained in the right leg with 1 comparison on the left.  Skin temperatures were very cold and so the feet were warmed prior to study.  Temperature correction was not needed.  Needle examination was obtained on selected muscles of the right leg.    Results:  1.  Severely prolonged right sural sensory distal latency at 5.0 ms with low amplitude of 2.3 µV.  2.  Normal right superficial peroneal sensory distal latency with low amplitude of 2.3 µV.  3.  Slow right  peroneal motor velocities below the knee at 26.5 m/s and in the short segment across the fibular head at 26.1 m/s.  The distal latency was normal.  The amplitude was very low at 0.35 mV.  4.  Slow right tibial motor velocity at 29.6 m/s with a normal distal latency but very low amplitude of 0.883 mV from ankle stimulation.  Slow left tibial motor velocity at 30.4 m/s with a normal distal latency but very low amplitude of 0.367 mV from ankle stimulation.  5.  Needle examination of selected muscles in the right leg showed fibrillations and positive sharp waves in the vastus lateralis as well as some positive sharp waves in the tibialis anterior, medial gastrocnemius and fibrillations and positive sharp waves in the extensor digitorum brevis.  There was a moderate increased number of large polyphasics in the vastus lateralis with very rapid firing rate and reduced interference pattern.  The iliopsoas showed normal insertional activities motor units and recruitment but the abductor candy showed normal insertional activities with an increased number of large motor units increased firing rate and reduced interference pattern.  There was an increased number of large motor units in the tibialis anterior, medial gastrocnemius and extensor digitorum brevis with increased firing rates and reduced interference patterns much more notable in the extensor digitorum brevis.  The peroneus longus was normal.  Lumbar paraspinals at L3-4 and 5 showed no abnormality    Impression:  Complex abnormal study showing evidence of rather severe peripheral neuropathy with superimposed needle exam changes consistent with a femoral neuropathy.  I cannot entirely exclude an L3 or 4 radiculopathy instead however no paraspinal abnormalities were seen to demonstrate root level involvement.  Study results were discussed with the patient.    Charles Delong M.D.              Dictated utilizing Dragon dictation.

## 2023-11-02 ENCOUNTER — PATIENT MESSAGE (OUTPATIENT)
Dept: FAMILY MEDICINE CLINIC | Facility: CLINIC | Age: 68
End: 2023-11-02

## 2023-11-03 ENCOUNTER — TELEPHONE (OUTPATIENT)
Dept: FAMILY MEDICINE CLINIC | Facility: CLINIC | Age: 68
End: 2023-11-03
Payer: MEDICARE

## 2023-11-03 NOTE — TELEPHONE ENCOUNTER
Caller: Opal Gutierrez    Relationship: Self    Best call back number: [phone]     What was the call regarding: PATIENT IS HAVING AN MRI ON 11/20 AND HAVING ANOTHER TEST ON 11/21. PLEASE ADVISE IF DR BROOKS WOULD LIKE HER TO STILL DO BOTH OF THEM.     PATIENT ALSO STATES SHE WAS PRESCRIBED A NEW PAIN MEDICATION (AMITRIPTYLINE) TO TAKE AT NIGHT BUT IT IS NOT HELPING. PLEASE ADVISE WHAT ELSE SHE CAN TRY.

## 2023-11-14 DIAGNOSIS — I50.9 CONGESTIVE HEART FAILURE, UNSPECIFIED HF CHRONICITY, UNSPECIFIED HEART FAILURE TYPE: ICD-10-CM

## 2023-11-14 RX ORDER — FUROSEMIDE 20 MG/1
TABLET ORAL
Qty: 15 TABLET | Refills: 0 | Status: SHIPPED | OUTPATIENT
Start: 2023-11-14

## 2023-11-20 ENCOUNTER — HOSPITAL ENCOUNTER (OUTPATIENT)
Dept: MRI IMAGING | Facility: HOSPITAL | Age: 68
Discharge: HOME | End: 2023-11-20
Admitting: FAMILY MEDICINE
Payer: MEDICARE

## 2023-11-20 DIAGNOSIS — M79.604 RIGHT LEG PAIN: ICD-10-CM

## 2023-11-20 DIAGNOSIS — M62.551 MUSCLE WASTING AND ATROPHY, NOT ELSEWHERE CLASSIFIED, RIGHT THIGH: ICD-10-CM

## 2023-11-20 PROCEDURE — 72148 MRI LUMBAR SPINE W/O DYE: CPT

## 2023-11-21 ENCOUNTER — HOSPITAL ENCOUNTER (OUTPATIENT)
Dept: CARDIOLOGY | Facility: HOSPITAL | Age: 68
Discharge: HOME | End: 2023-11-21
Admitting: FAMILY MEDICINE
Payer: MEDICARE

## 2023-11-21 DIAGNOSIS — E11.59 TYPE 2 DIABETES MELLITUS WITH OTHER CIRCULATORY COMPLICATIONS: ICD-10-CM

## 2023-11-21 DIAGNOSIS — I25.10 CORONARY ARTERY DISEASE INVOLVING NATIVE CORONARY ARTERY OF NATIVE HEART WITHOUT ANGINA PECTORIS: ICD-10-CM

## 2023-11-21 DIAGNOSIS — E10.40 TYPE 1 DIABETES MELLITUS WITH DIABETIC NEUROPATHY: ICD-10-CM

## 2023-11-21 DIAGNOSIS — M79.604 RIGHT LEG PAIN: ICD-10-CM

## 2023-11-21 DIAGNOSIS — M62.551 MUSCLE WASTING AND ATROPHY, NOT ELSEWHERE CLASSIFIED, RIGHT THIGH: Primary | ICD-10-CM

## 2023-11-21 LAB
BH CV LOWER ARTERIAL LEFT DORSALIS PEDIS SYS MAX: NORMAL
BH CV LOWER ARTERIAL LEFT GREAT TOE SYS MAX: 121
BH CV LOWER ARTERIAL LEFT POST TIBIAL SYS MAX: NORMAL
BH CV LOWER ARTERIAL LEFT TBI RATIO: 1.06
BH CV LOWER ARTERIAL RIGHT ABI RATIO: 1.39
BH CV LOWER ARTERIAL RIGHT DORSALIS PEDIS SYS MAX: 159
BH CV LOWER ARTERIAL RIGHT GREAT TOE SYS MAX: 115
BH CV LOWER ARTERIAL RIGHT POST TIBIAL SYS MAX: 140
BH CV LOWER ARTERIAL RIGHT TBI RATIO: 1.01
UPPER ARTERIAL LEFT ARM BRACHIAL SYS MAX: 109
UPPER ARTERIAL RIGHT ARM BRACHIAL SYS MAX: 114

## 2023-11-21 PROCEDURE — 93922 UPR/L XTREMITY ART 2 LEVELS: CPT

## 2023-11-22 ENCOUNTER — OFFICE VISIT (OUTPATIENT)
Dept: ENDOCRINOLOGY | Age: 68
End: 2023-11-22
Payer: MEDICARE

## 2023-11-22 VITALS
BODY MASS INDEX: 20.98 KG/M2 | DIASTOLIC BLOOD PRESSURE: 80 MMHG | WEIGHT: 134 LBS | TEMPERATURE: 96.8 F | HEART RATE: 73 BPM | SYSTOLIC BLOOD PRESSURE: 110 MMHG | OXYGEN SATURATION: 98 %

## 2023-11-22 DIAGNOSIS — E78.2 MIXED HYPERLIPIDEMIA: ICD-10-CM

## 2023-11-22 DIAGNOSIS — E10.42 DIABETIC PERIPHERAL NEUROPATHY ASSOCIATED WITH TYPE 1 DIABETES MELLITUS: ICD-10-CM

## 2023-11-22 DIAGNOSIS — Z96.41 INSULIN PUMP STATUS: ICD-10-CM

## 2023-11-22 DIAGNOSIS — Z86.79 HISTORY OF CAD (CORONARY ARTERY DISEASE): ICD-10-CM

## 2023-11-22 DIAGNOSIS — E10.65 TYPE 1 DIABETES MELLITUS WITH HYPERGLYCEMIA: Primary | ICD-10-CM

## 2023-11-22 PROCEDURE — 99214 OFFICE O/P EST MOD 30 MIN: CPT | Performed by: NURSE PRACTITIONER

## 2023-11-22 PROCEDURE — 3046F HEMOGLOBIN A1C LEVEL >9.0%: CPT | Performed by: NURSE PRACTITIONER

## 2023-11-22 PROCEDURE — 3074F SYST BP LT 130 MM HG: CPT | Performed by: NURSE PRACTITIONER

## 2023-11-22 PROCEDURE — 3079F DIAST BP 80-89 MM HG: CPT | Performed by: NURSE PRACTITIONER

## 2023-11-22 PROCEDURE — 95251 CONT GLUC MNTR ANALYSIS I&R: CPT | Performed by: NURSE PRACTITIONER

## 2023-11-22 RX ORDER — INSULIN LISPRO 100 [IU]/ML
INJECTION, SOLUTION INTRAVENOUS; SUBCUTANEOUS
Qty: 63 ML | Refills: 2 | Status: SHIPPED | OUTPATIENT
Start: 2023-11-22

## 2023-11-22 NOTE — PROGRESS NOTES
"Chief Complaint  Diabetes (Need refill on insulin. Pump attached.)    Subjective        Opal Gutierrez presents to [facility] END[facility]  History of Present Illness    Opal Gutierrez 68 y.o. presents for a follow-up evaluation for type 1 DM    Interval update: upcoming ERCP     Diagnosed diabetic in her 20s and started insulin shortly after  using an insulin pump since 2014     DM regimen:  Humalog via Medtronic 770 G insulin pump and cgm  She has not been utlizing automated mode  Has new 780 to start but needs assistnace    + neuropathy; numbness and tingling in feet   Upcoming neurology eval ordered from PCP, uses a cane and struggles with right LE weakness     Last eye exam was 03/23     + CAD; angioplasty with stent to the LAD done by Dr. Bruner in October 2020; Ischemic Cardiomyopathy     On atorvastatin 20 mg     Pmh s/f pancreatitis around 1995 thought to be due to alcohol use, osteoporosis which is being managed by her gynecologist Dr. Nolen    Cgm reviewed  12% very high  35% high  53% time in range  Manual mode 94%  Sensor mode 89%  GMI 7.8%  TDD 46u  Nolus 30u  Basal 16u          Objective   Vital Signs:  /80   Pulse 73   Temp 96.8 °F (36 °C) (Temporal)   Wt 60.8 kg (134 lb)   SpO2 98%   BMI 20.98 kg/m²   Estimated body mass index is 20.98 kg/m² as calculated from the following:    Height as of 10/24/23: 170.2 cm (67.01\").    Weight as of this encounter: 60.8 kg (134 lb).       BMI is within normal parameters. No other follow-up for BMI required.      Physical Exam  Vitals reviewed.   Constitutional:       General: She is not in acute distress.  HENT:      Head: Normocephalic and atraumatic.   Cardiovascular:      Rate and Rhythm: Normal rate and regular rhythm.   Pulmonary:      Effort: Pulmonary effort is normal. No respiratory distress.   Musculoskeletal:         General: No signs of injury. Normal range of motion.      Cervical back: Normal range of motion and " neck supple.   Skin:     General: Skin is warm and dry.   Neurological:      Mental Status: She is alert and oriented to person, place, and time. Mental status is at baseline.      Motor: Weakness present.      Gait: Gait abnormal.   Psychiatric:         Mood and Affect: Mood normal.         Behavior: Behavior normal.         Thought Content: Thought content normal.         Judgment: Judgment normal.        Result Review :  The following data was reviewed by: KATELIN Malik on 11/22/2023:  Common labs          5/9/2023    10:59 8/11/2023    12:21 10/31/2023    11:24   Common Labs   Glucose 150  158     BUN 12  11     Creatinine 0.84  0.75     Sodium 134  137     Potassium 3.9  4.8     Chloride 95  99     Calcium 9.2  9.6     Total Protein 7.7  7.6     Albumin 4.0  4.1     Total Bilirubin 0.5  0.6     Alkaline Phosphatase 154  128     AST (SGOT) 35  48     ALT (SGPT) 35  48     WBC   6.31       Hemoglobin   12.9       Hematocrit   41.4       Platelets   238       Total Cholesterol  116     Triglycerides  53     HDL Cholesterol  44     LDL Cholesterol   60     Hemoglobin A1C  10.30     Microalbumin, Urine 3.3         Details          This result is from an external source.                          Assessment and Plan   Diagnoses and all orders for this visit:    1. Type 1 diabetes mellitus with hyperglycemia (Primary)  -     Hemoglobin A1c  -     Comprehensive Metabolic Panel    2. Diabetic peripheral neuropathy associated with type 1 diabetes mellitus    3. History of CAD (coronary artery disease)    4. Insulin pump status    5. Mixed hyperlipidemia    Other orders  -     Insulin Lispro (HumaLOG) 100 UNIT/ML injection; 70u/day via insulin pump e10.65  Dispense: 63 mL; Refill: 2             Follow Up   Return in about 3 months (around 2/22/2024).    Resume automode and upgrade to 780g- have contacted [facility] to get this process started   Advised patient that she is consuming a large amount on carbs 340+/-  140 and would benefit from limiting her carb intake to 60g TID   Labs today  Cgm reviewed, GMI improving   Continue statin     Patient was given instructions and counseling regarding her condition or for health maintenance advice. Please see specific information pulled into the AVS if appropriate.       KATELIN Malik

## 2023-11-23 LAB
ALBUMIN SERPL-MCNC: 4.4 G/DL (ref 3.5–5.2)
ALBUMIN/GLOB SERPL: 1.3 G/DL
ALP SERPL-CCNC: 114 U/L (ref 39–117)
ALT SERPL-CCNC: 22 U/L (ref 1–33)
AST SERPL-CCNC: 29 U/L (ref 1–32)
BILIRUB SERPL-MCNC: 0.4 MG/DL (ref 0–1.2)
BUN SERPL-MCNC: 15 MG/DL (ref 8–23)
BUN/CREAT SERPL: 19.5 (ref 7–25)
CALCIUM SERPL-MCNC: 9.7 MG/DL (ref 8.6–10.5)
CHLORIDE SERPL-SCNC: 96 MMOL/L (ref 98–107)
CO2 SERPL-SCNC: 31.9 MMOL/L (ref 22–29)
CREAT SERPL-MCNC: 0.77 MG/DL (ref 0.57–1)
EGFRCR SERPLBLD CKD-EPI 2021: 85.2 ML/MIN/1.73
GLOBULIN SER CALC-MCNC: 3.5 GM/DL
GLUCOSE SERPL-MCNC: 233 MG/DL (ref 65–99)
HBA1C MFR BLD: 10.8 % (ref 4.8–5.6)
POTASSIUM SERPL-SCNC: 5 MMOL/L (ref 3.5–5.2)
PROT SERPL-MCNC: 7.9 G/DL (ref 6–8.5)
SODIUM SERPL-SCNC: 135 MMOL/L (ref 136–145)

## 2023-11-30 RX ORDER — TRAZODONE HYDROCHLORIDE 150 MG/1
TABLET ORAL
Qty: 90 TABLET | Refills: 3 | Status: SHIPPED | OUTPATIENT
Start: 2023-11-30

## 2023-11-30 NOTE — TELEPHONE ENCOUNTER
Rx Refill Note  Requested Prescriptions     Pending Prescriptions Disp Refills    traZODone (DESYREL) 150 MG tablet [Pharmacy Med Name: TRAZODONE 150MG (HUNDRED-FIFTY) TAB] 90 tablet 0     Sig: TAKE 1 TABLET BY MOUTH EVERY NIGHT AT BEDTIME      Last office visit with prescribing clinician: 10/24/2023   Last telemedicine visit with prescribing clinician: Visit date not found   Next office visit with prescribing clinician: Visit date not found       Adam Bishop  11/30/23, 14:47 EST

## 2023-12-20 ENCOUNTER — PATIENT MESSAGE (OUTPATIENT)
Dept: FAMILY MEDICINE CLINIC | Facility: CLINIC | Age: 68
End: 2023-12-20

## 2024-01-22 ENCOUNTER — OFFICE VISIT (OUTPATIENT)
Dept: NEUROLOGY | Facility: CLINIC | Age: 69
End: 2024-01-22
Payer: MEDICARE

## 2024-01-22 VITALS
DIASTOLIC BLOOD PRESSURE: 80 MMHG | SYSTOLIC BLOOD PRESSURE: 122 MMHG | HEIGHT: 67 IN | BODY MASS INDEX: 21.03 KG/M2 | OXYGEN SATURATION: 97 % | HEART RATE: 75 BPM | WEIGHT: 134 LBS

## 2024-01-22 DIAGNOSIS — E11.42 DIABETIC PERIPHERAL NEUROPATHY: ICD-10-CM

## 2024-01-22 DIAGNOSIS — M54.12 CERVICAL RADICULOPATHY AT C6: Primary | ICD-10-CM

## 2024-01-22 DIAGNOSIS — G89.29 CHRONIC PAIN OF BOTH KNEES: ICD-10-CM

## 2024-01-22 DIAGNOSIS — M25.561 CHRONIC PAIN OF BOTH KNEES: ICD-10-CM

## 2024-01-22 DIAGNOSIS — M25.562 CHRONIC PAIN OF BOTH KNEES: ICD-10-CM

## 2024-01-22 PROBLEM — K86.1 CHRONIC PANCREATITIS: Status: ACTIVE | Noted: 2023-11-15

## 2024-01-22 PROCEDURE — 3074F SYST BP LT 130 MM HG: CPT | Performed by: PSYCHIATRY & NEUROLOGY

## 2024-01-22 PROCEDURE — 99215 OFFICE O/P EST HI 40 MIN: CPT | Performed by: PSYCHIATRY & NEUROLOGY

## 2024-01-22 PROCEDURE — 3079F DIAST BP 80-89 MM HG: CPT | Performed by: PSYCHIATRY & NEUROLOGY

## 2024-01-22 PROCEDURE — 1160F RVW MEDS BY RX/DR IN RCRD: CPT | Performed by: PSYCHIATRY & NEUROLOGY

## 2024-01-22 PROCEDURE — 1159F MED LIST DOCD IN RCRD: CPT | Performed by: PSYCHIATRY & NEUROLOGY

## 2024-01-22 RX ORDER — GABAPENTIN 300 MG/1
CAPSULE ORAL
Qty: 90 CAPSULE | Refills: 5 | Status: SHIPPED | OUTPATIENT
Start: 2024-01-22

## 2024-01-22 NOTE — PROGRESS NOTES
CC: Diabetic peripheral neuropathy    HPI:  Opal Gutierrez is a  68 y.o.  right-handed white female who was sent for neurologic consultation by Dr. Lozano regarding diabetic peripheral neuropathy.  The patient indicates a 40-year history of diabetes and at least 20-year history of peripheral neuropathy.  She states that what has started her current situation is that 6 to 7 months ago her right knee gave out causing her to fall.  The right quad is weak and her knee hurts.  There is throbbing in the thigh.  She describes numbness and tingling in the feet and lower legs.  The numbness in the feet is fairly significant.  The pain is fairly severe also and she states that the leg is not really any better than it was 6 months ago.  She states that the hands do not seem particularly numb but they do get cold just like her feet.  She has type 1 diabetes and has an insulin pump but hemoglobin A1c still demonstrates poor control.  She describes typical pain in the feet as being a 5 or 6/10.  She tried some physical therapy but it did not go very well since her balance was so poor that she could not get much out of the exercise.  She indicates no family history of peripheral neuropathy but her father did have diabetes.  No one with stroke, brain tumor, brain hemorrhage or aneurysm of the brain artery.  She specifically denies significant head or spine trauma, meningitis, seizure or stroke.  She describes some left-sided neck pain.        Past Medical History:   Diagnosis Date    Acute metabolic encephalopathy 05/07/2021    Anxiety     Arthritis     Benign hypertension 11/07/2022    Coronary artery disease     Diabetes mellitus type I     Diabetic ketoacidosis with coma associated with type 2 diabetes mellitus 01/03/2021    Genital HSV     2 times per year    Heart attack 10/2020    Heart murmur     Hip fracture     right- 2012, left 2013    Hyperlipidemia     Insulin pump titration 03/27/2016    Kidney stone      Osteopenia     Osteoporosis     managed by gynecologist.  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    CORONARY STENT PLACEMENT      HAND SURGERY      for fracture    HIP SURGERY Left     JOINT REPLACEMENT  hip right    TOTAL HIP ARTHROPLASTY Right            Current Outpatient Medications:     Accu-Chek FastClix Lancets misc, Dx code E10.65 Testing bs 4 times daily, Disp: 408 each, Rfl: 1    Accu-Chek Guide test strip, Dx code E10.65 testing bs 4 x day, Disp: 400 each, Rfl: 1    Acetone, Urine, Test (Ketone Test) strip, 1 strip by Other route Daily., Disp: 100 strip, Rfl: 3    amitriptyline (ELAVIL) 10 MG tablet, Take 1 tablet by mouth Every Night., Disp: 30 tablet, Rfl: 2    aspirin 81 MG EC tablet, Take 1 tablet by mouth Daily., Disp: , Rfl:     atorvastatin (LIPITOR) 20 MG tablet, Take 1 tablet by mouth Daily., Disp: , Rfl:     carvedilol (COREG) 3.125 MG tablet, TK 1 T PO BID WITH MEALS, Disp: , Rfl:     Cholecalciferol (Vitamin D3) 25 MCG (1000 UT) capsule, 1 capsule daily.  Take in addition to vitamin D3 5000 units daily, Disp: 90 capsule, Rfl: 1    Cholecalciferol (VITAMIN D3) 5000 units capsule capsule, Take 1 capsule by mouth Daily., Disp: , Rfl:     clopidogrel (PLAVIX) 75 MG tablet, Take 1 tablet by mouth Daily., Disp: , Rfl:     CREON 99453-00227 units capsule delayed-release particles capsule, TK 2 CS PO TID, Disp: , Rfl: 3    FLUoxetine (PROzac) 20 MG capsule, TAKE 1 CAPSULE BY MOUTH EVERY DAY, Disp: 30 capsule, Rfl: 6    furosemide (LASIX) 20 MG tablet, TAKE 1 TABLET BY MOUTH EVERY DAY AS NEEDED FOR SWELLING, Disp: 15 tablet, Rfl: 0    glucagon (GLUCAGEN) 1 MG injection, Inject 1 mg into the appropriate muscle as directed by prescriber See Admin Instructions. For emergency use for severe low glucose: first inject, then call 911, once awake try to  give oral treatment., Disp: 1 kit, Rfl: 3    Insulin Infusion Pump device, Inject 1 each under the skin into the appropriate area as directed., Disp: , Rfl:     Insulin Lispro (HumaLOG) 100 UNIT/ML injection, 70u/day via insulin pump e10.65, Disp: 63 mL, Rfl: 2    pantoprazole (PROTONIX) 40 MG EC tablet, Take 1 tablet by mouth Daily., Disp: , Rfl:     spironolactone (ALDACTONE) 25 MG tablet, TK 1/2 T PO D, Disp: , Rfl:     traZODone (DESYREL) 150 MG tablet, TAKE 1 TABLET BY MOUTH EVERY NIGHT AT BEDTIME, Disp: 90 tablet, Rfl: 3    gabapentin (NEURONTIN) 300 MG capsule, 1 tab nightly for 1 week then 1 tab twice daily for 1 week then 1 tab 3 times per day, Disp: 90 capsule, Rfl: 5    Current Facility-Administered Medications:     zoledronic acid (RECLAST) infusion 5 mg, 5 mg, Intravenous, Once, Grayson Levi MD      Family History   Problem Relation Age of Onset    Cancer Father         prostate    Diabetes Father     Heart disease Father     Alzheimer's disease Mother         age 90    Colon cancer Maternal Grandmother 80    Heart disease Brother          Social History     Socioeconomic History    Marital status:    Tobacco Use    Smoking status: Never    Smokeless tobacco: Never   Vaping Use    Vaping Use: Never used   Substance and Sexual Activity    Alcohol use: Not Currently    Drug use: No    Sexual activity: Not Currently     Partners: Male     Birth control/protection: Other         Allergies   Allergen Reactions    Contrast Dye (Echo Or Unknown Ct/Mr) Other (See Comments)     Terrible back pains    Iodinated Contrast Media Unknown (See Comments)     SEVERE BACK PAIN  SEVERE BACK PAIN  SEVERE BACK PAIN    Penicillins Hives         Pain Scale: 5 or 6/10        ROS:  Review of Systems   Musculoskeletal:  Positive for gait problem and myalgias.   Neurological:  Positive for weakness (rt leg) and numbness (rt leg). Negative for dizziness, tremors, seizures, syncope, facial asymmetry, speech  "difficulty, light-headedness and headaches.   Psychiatric/Behavioral: Negative.           I have reviewed and agree with the above ROS completed by the medical assistant.      Physical Exam:  Vitals:    01/22/24 1514   BP: 122/80   Pulse: 75   SpO2: 97%   Weight: 60.8 kg (134 lb)   Height: 170.2 cm (67.01\")     Orthostatic BP:    Body mass index is 20.98 kg/m².    Physical Exam  General: Thin white female no acute distress  HEENT: Normocephalic no evidence of trauma.  Discs poorly seen.  Throat negative  Neck: Supple.  No thyromegaly.  Transmitted cardiac murmur heard in the neck vessels  Heart: Regular rate and rhythm with a grade 2/6 systolic murmur heard best in the aortic area.  No pedal edema  Extremities: Radial pulses were strong and simultaneous      Neurological Exam:   Mental Status: Awake, alert, oriented to person, place and time.  Conversant without evidence of an affective disorder, thought disorder, delusions or hallucinations.  Attention span and concentration are normal.  HCF: No aphasia, apraxia or dysarthria.  Recent and remote memory intact.  Knowledge of recent events intact.  CN: I:   II: Visual fields full without left inattention   III, IV, VI: Eye movements intact without nystagmus or ptosis.  Pupils equal  round and reactive to light.   V,VII: Light touch and pinprick intact all 3 divisions of V.  Facial muscles symmetrical.   VIII: Hearing intact to finger rub   IX,X: Soft palate elevates symmetrically   XI: Sternomastoid and trapezius are strong.   XII: Tongue midline without atrophy or fasciculations  Motor: Normal tone in the upper and lower extremities.  Bulk reduced in the interosseous muscles of the hands   Power testing: Prominent weakness of first dorsal interosseous and abductor digiti quinti with pretty good abductor pollicis brevis strength.  There is some mild biceps weakness more notable triceps and pronator teres weakness bilaterally  Reflexes: Upper extremities: Very " "diffusely hypoactive        Lower extremities: Very diffusely hypoactive        Toe signs: Equivocal  Sensory: Light touch: Reduced in the small digits both hands reduced in the feet        Pinprick: Reduced in the small digits both hands reduced in the feet fairly significantly        Vibration: Essentially absent at the ankles        Position: Absent at the great toe on the right and abnormal on the left    Cerebellar: Finger-to-nose: Intact           Rapid movement: Intact           Heel-to-shin: Intact  Gait and Station: Broad-based ataxic complains of pain in the right knee.  Not using a rolling walker    Results:      Lab Results   Component Value Date    GLUCOSE 233 (H) 11/22/2023    BUN 15 11/22/2023    CREATININE 0.77 11/22/2023    EGFRIFNONA 82 02/24/2022    EGFRIFAFRI >60 08/31/2022    BCR 19.5 11/22/2023    CO2 31.9 (H) 11/22/2023    CALCIUM 9.7 11/22/2023    PROTENTOTREF 7.9 11/22/2023    ALBUMIN 4.4 11/22/2023    LABIL2 1.3 11/22/2023    AST 29 11/22/2023    ALT 22 11/22/2023       Lab Results   Component Value Date    WBC 6.31 10/31/2023    HGB 12.9 10/31/2023    HCT 41.4 10/31/2023    MCV 89.0 10/31/2023     10/31/2023         .No results found for: \"RPR\"      Lab Results   Component Value Date    TSH 2.420 05/09/2023    THYROIDAB <9 01/27/2021         Lab Results   Component Value Date    WOZEYIWZ09 428 10/31/2023         Lab Results   Component Value Date    FOLATE 11.0 10/31/2023         Lab Results   Component Value Date    HGBA1C 10.80 (H) 11/22/2023         Lab Results   Component Value Date    GLUCOSE 233 (H) 11/22/2023    BUN 15 11/22/2023    CREATININE 0.77 11/22/2023    EGFRIFNONA 82 02/24/2022    EGFRIFAFRI >60 08/31/2022    BCR 19.5 11/22/2023    K 5.0 11/22/2023    CO2 31.9 (H) 11/22/2023    CALCIUM 9.7 11/22/2023    PROTENTOTREF 7.9 11/22/2023    ALBUMIN 4.4 11/22/2023    LABIL2 1.3 11/22/2023    AST 29 11/22/2023    ALT 22 11/22/2023         Lab Results   Component Value Date    " WBC 6.31 10/31/2023    HGB 12.9 10/31/2023    HCT 41.4 10/31/2023    MCV 89.0 10/31/2023     10/31/2023             Assessment:   1.  Severe diabetic peripheral neuropathy-other contributors to be excluded.  2.  Superimposed right femoral neuropathy  3.  Right knee pain-origin not clear.  The knee is tender.  Further investigation indicated  4.  History of right hip replacement-some proximal pain but also proximal muscle appearing tenderness        Plan:  1.  Completion of labs for peripheral neuropathy workup to include SPEP, IEP, heavy metals, copper level  2.  X-rays of both knees  3.  Formal orthopedic consult-the patient has seen Dr. Fuentes previously in the [facility] system.  Will send her back to see him.  4.  MRI cervical spine looking for a C6 radiculopathy and consideration of a myelopathy  5.  Trial of gabapentin 300 mg tablets daily uptitrating to 3 times daily over a couple of weeks.  Side effect profile reviewed        Time: 60 minutes            Dictated utilizing Dragon dictation.

## 2024-01-24 ENCOUNTER — TELEPHONE (OUTPATIENT)
Dept: NEUROLOGY | Facility: CLINIC | Age: 69
End: 2024-01-24

## 2024-01-24 DIAGNOSIS — M79.604 RIGHT LEG PAIN: ICD-10-CM

## 2024-01-24 RX ORDER — AMITRIPTYLINE HYDROCHLORIDE 10 MG/1
10 TABLET, FILM COATED ORAL NIGHTLY
Qty: 30 TABLET | Refills: 5 | Status: SHIPPED | OUTPATIENT
Start: 2024-01-24

## 2024-01-24 NOTE — TELEPHONE ENCOUNTER
Rx Refill Note  Requested Prescriptions     Pending Prescriptions Disp Refills    amitriptyline (ELAVIL) 10 MG tablet [Pharmacy Med Name: AMITRIPTYLINE 10MG TABLETS] 30 tablet 2     Sig: TAKE 1 TABLET BY MOUTH EVERY NIGHT      Last office visit with prescribing clinician: 10/24/2023   Last telemedicine visit with prescribing clinician: Visit date not found   Next office visit with prescribing clinician: Visit date not found       Adam Bishop  01/24/24, 10:29 EST

## 2024-01-24 NOTE — TELEPHONE ENCOUNTER
Provider: JOSE SINGH MD    Caller: SUHAIL    Relationship to Patient: SELF    Phone Number: [phone]    Reason for Call: PT CALLING WITH QUESTIONS REGARDING LAST VISIT.   STATED SHE WAS TOLD TO HAVE LAB WORK DONE & CAN'T FIND THE NUMBER TO CALL FOR THEM.   STATED SHE WAS TO HAVE SOME OTHER TEST ORDERED AN HAS NOT HEARD FROM THE EITHER.     When was the patient last seen: 1-22-24    PLEASE CALL & ADVISE

## 2024-01-26 ENCOUNTER — HOSPITAL ENCOUNTER (OUTPATIENT)
Dept: GENERAL RADIOLOGY | Facility: HOSPITAL | Age: 69
Discharge: HOME | End: 2024-01-26
Payer: MEDICARE

## 2024-01-26 ENCOUNTER — LAB (OUTPATIENT)
Dept: LAB | Facility: HOSPITAL | Age: 69
End: 2024-01-26
Payer: MEDICARE

## 2024-01-26 DIAGNOSIS — M25.562 CHRONIC PAIN OF BOTH KNEES: ICD-10-CM

## 2024-01-26 DIAGNOSIS — G89.29 CHRONIC PAIN OF BOTH KNEES: ICD-10-CM

## 2024-01-26 DIAGNOSIS — M25.561 CHRONIC PAIN OF BOTH KNEES: ICD-10-CM

## 2024-01-26 DIAGNOSIS — E11.42 DIABETIC PERIPHERAL NEUROPATHY: ICD-10-CM

## 2024-01-26 PROCEDURE — 82525 ASSAY OF COPPER: CPT

## 2024-01-26 PROCEDURE — 84155 ASSAY OF PROTEIN SERUM: CPT | Performed by: PSYCHIATRY & NEUROLOGY

## 2024-01-26 PROCEDURE — 83655 ASSAY OF LEAD: CPT | Performed by: PSYCHIATRY & NEUROLOGY

## 2024-01-26 PROCEDURE — 83825 ASSAY OF MERCURY: CPT | Performed by: PSYCHIATRY & NEUROLOGY

## 2024-01-26 PROCEDURE — 86334 IMMUNOFIX E-PHORESIS SERUM: CPT

## 2024-01-26 PROCEDURE — 73562 X-RAY EXAM OF KNEE 3: CPT

## 2024-01-26 PROCEDURE — 84165 PROTEIN E-PHORESIS SERUM: CPT | Performed by: PSYCHIATRY & NEUROLOGY

## 2024-01-26 PROCEDURE — 82175 ASSAY OF ARSENIC: CPT | Performed by: PSYCHIATRY & NEUROLOGY

## 2024-01-26 PROCEDURE — 82595 ASSAY OF CRYOGLOBULIN: CPT

## 2024-01-26 PROCEDURE — 82784 ASSAY IGA/IGD/IGG/IGM EACH: CPT

## 2024-01-26 PROCEDURE — 36415 COLL VENOUS BLD VENIPUNCTURE: CPT

## 2024-01-29 LAB
ALBUMIN SERPL ELPH-MCNC: 3.3 G/DL (ref 2.9–4.4)
ALBUMIN/GLOB SERPL: 0.8 {RATIO} (ref 0.7–1.7)
ALPHA1 GLOB SERPL ELPH-MCNC: 0.2 G/DL (ref 0–0.4)
ALPHA2 GLOB SERPL ELPH-MCNC: 1.1 G/DL (ref 0.4–1)
B-GLOBULIN SERPL ELPH-MCNC: 1 G/DL (ref 0.7–1.3)
GAMMA GLOB SERPL ELPH-MCNC: 2 G/DL (ref 0.4–1.8)
GLOBULIN SER CALC-MCNC: 4.3 G/DL (ref 2.2–3.9)
IGA SERPL-MCNC: 268 MG/DL (ref 87–352)
IGG SERPL-MCNC: 1988 MG/DL (ref 586–1602)
IGM SERPL-MCNC: 135 MG/DL (ref 26–217)
LABORATORY COMMENT REPORT: ABNORMAL
M PROTEIN SERPL ELPH-MCNC: 0.7 G/DL
PROT PATTERN SERPL ELPH-IMP: ABNORMAL
PROT PATTERN SERPL IFE-IMP: ABNORMAL
PROT SERPL-MCNC: 7.6 G/DL (ref 6–8.5)

## 2024-01-30 ENCOUNTER — TELEPHONE (OUTPATIENT)
Dept: NEUROLOGY | Facility: CLINIC | Age: 69
End: 2024-01-30

## 2024-01-30 NOTE — TELEPHONE ENCOUNTER
Caller: Opal Gutierrez    Relationship: Self    Best call back number: [phone]    Caller requesting test results: PATIENT    What test was performed: Immunofixation, SERUM    When was the test performed: 1/26/24    Where was the test performed: [facility]    Additional notes: PATIENT SEES THAT THIS TEST CAME BACK ABNORMAL BUT DOESN'T KNOW WHAT THAT MEANS FOR HER EXACTLY. PLEASE ADVISE.

## 2024-01-30 NOTE — TELEPHONE ENCOUNTER
Called pt and advised that we are waiting on a few more lab results to come in. Once all labs are resulted she will received a phone call with the meaning/dx. Pt verbalized understanding

## 2024-01-31 NOTE — TELEPHONE ENCOUNTER
Rx Refill Note  Requested Prescriptions     Pending Prescriptions Disp Refills    Cholecalciferol (Vitamin D3) 25 MCG (1000 UT) capsule [Pharmacy Med Name: VITAMIN D3 1,000UNIT SOFTGEL CAPS] 90 capsule 1     Sig: TAKE 1 CAPSULE BY MOUTH DAILY IN ADDITION TO VITAMIN D3 5000 UNITS DAILY      Last office visit with prescribing clinician: 5/9/2023   Last telemedicine visit with prescribing clinician: Visit date not found   Next office visit with prescribing clinician: Visit date not found                         Would you like a call back once the refill request has been completed: [] Yes [] No    If the office needs to give you a call back, can they leave a voicemail: [] Yes [] No    Deepali Wade  01/31/24, 09:11 EST

## 2024-02-01 DIAGNOSIS — G63 NEUROPATHY ASSOCIATED WITH MGUS: Primary | ICD-10-CM

## 2024-02-01 DIAGNOSIS — D47.2 NEUROPATHY ASSOCIATED WITH MGUS: Primary | ICD-10-CM

## 2024-02-01 LAB — CRYOGLOB SER QL 1D COLD INC: NORMAL

## 2024-02-02 ENCOUNTER — TELEPHONE (OUTPATIENT)
Dept: NEUROLOGY | Facility: CLINIC | Age: 69
End: 2024-02-02
Payer: MEDICARE

## 2024-02-02 ENCOUNTER — LAB (OUTPATIENT)
Dept: LAB | Facility: HOSPITAL | Age: 69
End: 2024-02-02
Payer: MEDICARE

## 2024-02-02 DIAGNOSIS — D47.2 NEUROPATHY ASSOCIATED WITH MGUS: ICD-10-CM

## 2024-02-02 DIAGNOSIS — G63 NEUROPATHY ASSOCIATED WITH MGUS: ICD-10-CM

## 2024-02-02 LAB — COPPER SERPL-MCNC: 80 UG/DL (ref 80–158)

## 2024-02-02 PROCEDURE — 83516 IMMUNOASSAY NONANTIBODY: CPT

## 2024-02-02 PROCEDURE — 36415 COLL VENOUS BLD VENIPUNCTURE: CPT

## 2024-02-02 PROCEDURE — 86255 FLUORESCENT ANTIBODY SCREEN: CPT

## 2024-02-02 NOTE — TELEPHONE ENCOUNTER
Called to schedule 1 month follow up with Katerina Richard.  Left voicemail.  Scheduled for 3/6/2024 at 10. Sent reminder in mail.

## 2024-02-06 ENCOUNTER — HOSPITAL ENCOUNTER (OUTPATIENT)
Dept: MRI IMAGING | Facility: HOSPITAL | Age: 69
Discharge: HOME | End: 2024-02-06
Admitting: PSYCHIATRY & NEUROLOGY
Payer: MEDICARE

## 2024-02-06 DIAGNOSIS — M54.12 CERVICAL RADICULOPATHY AT C6: ICD-10-CM

## 2024-02-06 LAB
ARSENIC BLD-MCNC: 1 UG/L (ref 0–9)
LEAD BLDV-MCNC: <1 UG/DL (ref 0–3.4)
MERCURY BLD-MCNC: <1 UG/L (ref 0–14.9)

## 2024-02-06 PROCEDURE — 72141 MRI NECK SPINE W/O DYE: CPT

## 2024-02-08 DIAGNOSIS — M48.02 CERVICAL SPINAL STENOSIS: ICD-10-CM

## 2024-02-08 DIAGNOSIS — J90 PLEURAL EFFUSION: Primary | ICD-10-CM

## 2024-02-09 ENCOUNTER — HOSPITAL ENCOUNTER (OUTPATIENT)
Dept: GENERAL RADIOLOGY | Facility: HOSPITAL | Age: 69
Discharge: HOME | End: 2024-02-09
Payer: MEDICARE

## 2024-02-09 DIAGNOSIS — J90 PLEURAL EFFUSION: ICD-10-CM

## 2024-02-09 PROCEDURE — 71047 X-RAY EXAM CHEST 3 VIEWS: CPT

## 2024-02-12 ENCOUNTER — TELEPHONE (OUTPATIENT)
Dept: FAMILY MEDICINE CLINIC | Facility: CLINIC | Age: 69
End: 2024-02-12
Payer: MEDICARE

## 2024-02-13 ENCOUNTER — TELEPHONE (OUTPATIENT)
Dept: FAMILY MEDICINE CLINIC | Facility: CLINIC | Age: 69
End: 2024-02-13
Payer: MEDICARE

## 2024-02-13 ENCOUNTER — PATIENT MESSAGE (OUTPATIENT)
Dept: FAMILY MEDICINE CLINIC | Facility: CLINIC | Age: 69
End: 2024-02-13

## 2024-02-13 DIAGNOSIS — J90 PLEURAL EFFUSION ON LEFT: Primary | ICD-10-CM

## 2024-02-14 ENCOUNTER — OFFICE VISIT (OUTPATIENT)
Dept: ENDOCRINOLOGY | Age: 69
End: 2024-02-14
Payer: MEDICARE

## 2024-02-14 VITALS
TEMPERATURE: 96.9 F | SYSTOLIC BLOOD PRESSURE: 118 MMHG | HEART RATE: 76 BPM | HEIGHT: 67 IN | BODY MASS INDEX: 20.12 KG/M2 | DIASTOLIC BLOOD PRESSURE: 64 MMHG | OXYGEN SATURATION: 96 % | WEIGHT: 128.2 LBS

## 2024-02-14 DIAGNOSIS — E78.2 MIXED HYPERLIPIDEMIA: ICD-10-CM

## 2024-02-14 DIAGNOSIS — Z46.81 INSULIN PUMP TITRATION: ICD-10-CM

## 2024-02-14 DIAGNOSIS — Z86.79 HISTORY OF CAD (CORONARY ARTERY DISEASE): ICD-10-CM

## 2024-02-14 DIAGNOSIS — E55.9 VITAMIN D DEFICIENCY: ICD-10-CM

## 2024-02-14 DIAGNOSIS — Z96.41 INSULIN PUMP STATUS: ICD-10-CM

## 2024-02-14 DIAGNOSIS — E10.65 TYPE 1 DIABETES MELLITUS WITH HYPERGLYCEMIA: Primary | ICD-10-CM

## 2024-02-14 DIAGNOSIS — E10.42 DIABETIC PERIPHERAL NEUROPATHY ASSOCIATED WITH TYPE 1 DIABETES MELLITUS: ICD-10-CM

## 2024-02-14 LAB — REF LAB TEST METHOD: NORMAL

## 2024-02-14 PROCEDURE — 99214 OFFICE O/P EST MOD 30 MIN: CPT | Performed by: NURSE PRACTITIONER

## 2024-02-14 PROCEDURE — 3074F SYST BP LT 130 MM HG: CPT | Performed by: NURSE PRACTITIONER

## 2024-02-14 PROCEDURE — 3078F DIAST BP <80 MM HG: CPT | Performed by: NURSE PRACTITIONER

## 2024-02-26 ENCOUNTER — TELEPHONE (OUTPATIENT)
Dept: ENDOCRINOLOGY | Age: 69
End: 2024-02-26
Payer: MEDICARE

## 2024-02-26 NOTE — TELEPHONE ENCOUNTER
Hub staff attempted to follow warm transfer process and was unsuccessful     Caller: Opal Gutierrez    Relationship to patient: Self    Best call back number: [phone]    Patient is needing: PATIENT RECENTLY UPGRADED HER PUMP TO 780G. SHE BECAUSE [facility] TOLD HER THEY SENT A REQUEST FOR HER SENSORS BUT HAVE NOT HAD A RESPONSE. SHE WOULD LIKE SOMEONE TO CALL HER BACK ABOUT THIS.

## 2024-02-26 NOTE — TELEPHONE ENCOUNTER
Responded to patient via [employer] message. Filled out [facility] medical paperwork and will fax it for patient's medtronic sensors.

## 2024-03-06 ENCOUNTER — OFFICE VISIT (OUTPATIENT)
Dept: NEUROLOGY | Facility: CLINIC | Age: 69
End: 2024-03-06
Payer: MEDICARE

## 2024-03-06 VITALS
HEART RATE: 73 BPM | DIASTOLIC BLOOD PRESSURE: 64 MMHG | OXYGEN SATURATION: 97 % | WEIGHT: 128 LBS | SYSTOLIC BLOOD PRESSURE: 120 MMHG | BODY MASS INDEX: 20.04 KG/M2

## 2024-03-06 DIAGNOSIS — E11.42 DIABETIC PERIPHERAL NEUROPATHY: ICD-10-CM

## 2024-03-06 DIAGNOSIS — G63 NEUROPATHY ASSOCIATED WITH MGUS: Primary | ICD-10-CM

## 2024-03-06 DIAGNOSIS — R26.9 GAIT DISTURBANCE: ICD-10-CM

## 2024-03-06 DIAGNOSIS — M25.561 CHRONIC PAIN OF BOTH KNEES: ICD-10-CM

## 2024-03-06 DIAGNOSIS — G89.29 CHRONIC PAIN OF BOTH KNEES: ICD-10-CM

## 2024-03-06 DIAGNOSIS — M48.02 CERVICAL SPINAL STENOSIS: ICD-10-CM

## 2024-03-06 DIAGNOSIS — D47.2 NEUROPATHY ASSOCIATED WITH MGUS: Primary | ICD-10-CM

## 2024-03-06 DIAGNOSIS — M25.562 CHRONIC PAIN OF BOTH KNEES: ICD-10-CM

## 2024-03-06 NOTE — PROGRESS NOTES
CC: Diabetic peripheral neuropathy    HPI:  Opal Gutierrez is a  68 y.o.  right-handed white female who I am seeing for the first time in follow-up regarding diabetic peripheral neuropathy.  She is accompanied by her  who adds to the history.  She has a past medical history of anemia, CAD, diabetes type 1 (Insulin pump), hyperlipidemia, hypertension, IRVIN, and MI.  She was last seen by Dr. Delong on 1/22/2024, with the following history taken from that note with additions/modifications as indicated:    The patient has a 40-year history of diabetes and at least 20-year history of peripheral neuropathy.  She states that what started her current situation is that 6 to 7 months ago her right knee gave out causing her to fall.  The right quad is weak and her knee hurts. There is throbbing in the thigh.  She describes numbness and tingling in the feet and lower legs. The numbness in the feet is fairly significant.  The pain is fairly severe also and she states that the leg is not really any better than it was 6 months ago.  She states that the hands do not seem particularly numb but they do get cold just like her feet.  She describes some left-sided neck pain.  She tried physical therapy but it did not go very well since her balance was so poor and she could not get much out of the exercise.    Dr. Delong performed an EMG on 11/1/2023 with the following impression:    Complex abnormal study showing evidence of rather severe peripheral neuropathy with superimposed needle exam changes consistent with a femoral neuropathy. I cannot entirely exclude an L3 or 4 radiculopathy instead however no paraspinal abnormalities were seen to demonstrate root level involvement.     History interim    Today she reports that her pain is better controlled on gabapentin.  She continues to take gabapentin 300 mg 3 times a day.  She does report some occasional drowsiness in the afternoon but no other side effects.  She reports numbness  mostly in the soles of her feet.  She denies any burning, shooting or electrical shocks.  She denies any numbness or tingling in her hands.  She does report that the fingertips turn purple and get cold.  She denies any weakness in her hands.  She does report some neck pain and reduced range of motion in all directions.  She denies any recent falls and states that her last fall was about 8 months ago.  She continues to ambulate with a walker.  She states that her balance is not good and she is very unsteady on her feet.  Her labs for treatable causes of neuropathy showed an elevated IgG of 1988 associated with MGUS and kappa light chain.  Copper 80, cryoglobulin not detected and heavy metals normal.  Motor and sensory neuropathy panel was negative for any antibodies attacking the nerves.    She was referred back to orthopedics for her bilateral knee degeneration and received a injection in her right knee which helped.  She has an appointment with Dr. Khoury with heme-onc for MGUS on 3/8/2024.  She has an appointment with neurosurgery 3/12/2024 for spinal stenosis and severe bilateral neuroforaminal stenosis at C4-5.  She was found to have pleural effusion on MRI and her PCP has ordered a thoracentesis but it has not been scheduled yet.      Past Medical History:   Diagnosis Date    Acute metabolic encephalopathy 05/07/2021    Anemia     Anxiety     Arthritis     Benign hypertension 11/07/2022    Cirrhosis     Coronary artery disease     Depression     Diabetes mellitus type I     Diabetic ketoacidosis with coma associated with type 2 diabetes mellitus 01/03/2021    Esophageal varices     Genital HSV     2 times per year    GERD (gastroesophageal reflux disease)     Heart attack 10/2020    Heart murmur     Hip fracture     right- 2012, left 2013    Hyperlipidemia     Insulin pump titration 03/27/2016    Kidney stone     Myocardial infarction     IRVIN (nonalcoholic steatohepatitis)     Neuropathy     Osteopenia      Osteoporosis     managed by gynecologist.  On Reclast yearly    Osteoporosis, postmenopausal 07/22/2016    Pancreatitis          Past Surgical History:   Procedure Laterality Date    CAROTID STENT  10/2020    CATARACT EXTRACTION      COLONOSCOPY  2019    normal.    CORONARY STENT PLACEMENT      ENDOSCOPY      HAND SURGERY      for fracture    HIP SURGERY Left     JOINT REPLACEMENT  hip right    TOTAL HIP ARTHROPLASTY Right            Current Outpatient Medications:     Accu-Chek FastClix Lancets misc, Dx code E10.65 Testing bs 4 times daily, Disp: 408 each, Rfl: 1    Accu-Chek Guide test strip, Dx code E10.65 testing bs 4 x day, Disp: 400 each, Rfl: 1    Acetone, Urine, Test (Ketone Test) strip, 1 strip by Other route Daily., Disp: 100 strip, Rfl: 3    aspirin 81 MG EC tablet, Take 1 tablet by mouth Daily., Disp: , Rfl:     atorvastatin (LIPITOR) 20 MG tablet, Take 1 tablet by mouth Daily., Disp: , Rfl:     Cholecalciferol (Vitamin D3) 25 MCG (1000 UT) capsule, TAKE 1 CAPSULE BY MOUTH DAILY IN ADDITION TO VITAMIN D3 5000 UNITS DAILY, Disp: 90 capsule, Rfl: 1    CREON 54456-70459 units capsule delayed-release particles capsule, TK 2 CS PO TID, Disp: , Rfl: 3    FLUoxetine (PROzac) 20 MG capsule, TAKE 1 CAPSULE BY MOUTH EVERY DAY, Disp: 30 capsule, Rfl: 6    furosemide (LASIX) 20 MG tablet, TAKE 1 TABLET BY MOUTH EVERY DAY AS NEEDED FOR SWELLING, Disp: 15 tablet, Rfl: 0    gabapentin (NEURONTIN) 300 MG capsule, 1 tab nightly for 1 week then 1 tab twice daily for 1 week then 1 tab 3 times per day, Disp: 90 capsule, Rfl: 5    glucagon (GLUCAGEN) 1 MG injection, Inject 1 mg into the appropriate muscle as directed by prescriber See Admin Instructions. For emergency use for severe low glucose: first inject, then call 911, once awake try to give oral treatment., Disp: 1 kit, Rfl: 3    Insulin Infusion Pump device, Inject 1 each under the skin into the appropriate area as directed., Disp: , Rfl:     Insulin Lispro (HumaLOG)  100 UNIT/ML injection, 70u/day via insulin pump e10.65, Disp: 63 mL, Rfl: 2    pantoprazole (PROTONIX) 40 MG EC tablet, Take 1 tablet by mouth Daily., Disp: , Rfl:     spironolactone (ALDACTONE) 25 MG tablet, TK 1/2 T PO D, Disp: , Rfl:     traZODone (DESYREL) 150 MG tablet, TAKE 1 TABLET BY MOUTH EVERY NIGHT AT BEDTIME, Disp: 90 tablet, Rfl: 3    Current Facility-Administered Medications:     zoledronic acid (RECLAST) infusion 5 mg, 5 mg, Intravenous, Once, Grayson Levi MD      Family History   Problem Relation Age of Onset    Cancer Father         prostate    Diabetes Father     Heart disease Father     Alzheimer's disease Mother         age 90    Colon cancer Maternal Grandmother 80    Heart disease Brother          Social History     Socioeconomic History    Marital status:    Tobacco Use    Smoking status: Never    Smokeless tobacco: Never   Vaping Use    Vaping status: Never Used   Substance and Sexual Activity    Alcohol use: Not Currently    Drug use: No    Sexual activity: Not Currently     Partners: Male     Birth control/protection: Other         Allergies   Allergen Reactions    Contrast Dye (Echo Or Unknown Ct/Mr) Other (See Comments)     Terrible back pains    Iodinated Contrast Media Unknown (See Comments)     SEVERE BACK PAIN  SEVERE BACK PAIN  SEVERE BACK PAIN    Penicillins Hives         Pain Scale: 9/10 neck and right hip        ROS:  Review of Systems   Musculoskeletal:  Positive for gait problem, neck pain and neck stiffness.   Neurological:  Positive for weakness and numbness. Negative for dizziness, tremors, seizures, syncope, facial asymmetry, speech difficulty, light-headedness and headaches.   Psychiatric/Behavioral: Negative.         I have reviewed and agree with the above ROS completed by medical assistant.    Physical Exam:  Vitals:    03/06/24 1016   Weight: 58.1 kg (128 lb)     Orthostatic BP:    Body mass index is 20.04 kg/m².    Physical Exam  General: Slender  white female no acute distress  HEENT: Normocephalic no evidence of trauma  Neck: Supple.  No thyromegaly.  Transient cardiac murmur heard in the neck vessels  Heart: Regular rate and rhythm.  Murmur present  Extremities: Radial pulses strong and simultaneous.  Trace pedal edema.  Hammertoes on the right foot      Neurological Exam:   Mental Status: Awake, alert, oriented to person, place and time.  Conversant without evidence of an affective disorder, thought disorder, delusions or hallucinations.  Attention span and concentration are normal.  HCF: No aphasia, apraxia or dysarthria.  Recent and remote memory intact.  Knowledge of recent events intact.  CN: I:   II: Visual fields full without left inattention   III, IV, VI: Eye movements intact without nystagmus or ptosis.  Pupils equal round and reactive to light.   V,VII: Light touch and pinprick intact all 3 divisions of V.  Facial muscles symmetrical.   VIII: Hearing intact to finger rub   IX,X: Soft palate elevates symmetrically   XI: Sternomastoid and trapezius are strong.   XII: Tongue midline without atrophy or fasciculations  Motor: Normal tone in the upper and lower extremities.  Reduced bulk in the intrinsic hand muscles   Power testing: Significant weakness of first dorsal interosseous and abductor digiti quinti, mild weakness of abductor pollicis brevis muscles mild weakness of triceps and pronator teres bilaterally.  Moderate weakness of iliopsoas muscles bilaterally worse on the right, mild right tibialis anterior weakness, and bilateral toe extensor and flexor muscles.  Reflexes: Upper extremities: Very diffusely hypoactive        Lower extremities: Areflexia        Toe signs: Equivocal  Sensory: Light touch: Diffusely intact in the hands, reduced in the feet/toes        Pinprick: Reduced in a gradient fashion down the legs and fairly significantly on the feet        Vibration: Absent at the ankles        Position: Abnormal in the great toes  bilaterally    Cerebellar: Finger-to-nose: Normal           Rapid movement: Normal           Heel-to-shin: Dysmetria  Gait and Station: Comes to stand pushing off the chair.  Broad-based.  Appears ataxic.  Ambulated with walker    Results:      Lab Results   Component Value Date    GLUCOSE 233 (H) 11/22/2023    BUN 15 11/22/2023    CREATININE 0.77 11/22/2023    EGFRIFNONA 82 02/24/2022    EGFRIFAFRI >60 08/31/2022    BCR 19.5 11/22/2023    CO2 31.9 (H) 11/22/2023    CALCIUM 9.7 11/22/2023    PROTENTOTREF 7.6 01/26/2024    ALBUMIN 3.3 01/26/2024    LABIL2 0.8 01/26/2024    AST 29 11/22/2023    ALT 22 11/22/2023       Lab Results   Component Value Date    WBC 6.31 10/31/2023    HGB 12.9 10/31/2023    HCT 41.4 10/31/2023    MCV 89.0 10/31/2023     10/31/2023       Lab Results   Component Value Date    TSH 2.420 05/09/2023    THYROIDAB <9 01/27/2021       Lab Results   Component Value Date    VMPMBRBH79 428 10/31/2023       Lab Results   Component Value Date    FOLATE 11.0 10/31/2023       Lab Results   Component Value Date    HGBA1C 10.80 (H) 11/22/2023       Lab Results   Component Value Date    ARSENIC 1 01/26/2024       Lab Results   Component Value Date    MERCURY <1.0 01/26/2024       Lab Results   Component Value Date    COPPER 80 01/26/2024       MRI CERVICAL SPINE WITHOUT CONTRAST     HISTORY: Left-sided neck pain.     COMPARISON: None.     FINDINGS: There is moderate-to-severe loss of disc height and disc  desiccation at C3-C4. Anterolisthesis of C3 upon C4 is appreciated  estimated to be approximately 4 mm. 3 mm of retrolisthesis of C4 upon C5  is appreciated. There is moderate loss of disc height at C5-C6, C6-C7  and C7-T1.     Signal intensity within the cord is normal on the sagittal T2 sequence.     C2-C3: There is moderate-to-severe facet degenerative disease on the  left.     C3-C4: Moderate facet degenerative disease is present bilaterally, more  prominent on the right. Moderate-to-severe  foraminal stenosis is present  on the right secondary to uncovertebral degenerative disease, loss of  disc height, anterolisthesis of C3 upon C4, facet hypertrophy and  extension of a small disc osteophyte complex into the neural foramen.     C4-C5: There is mild-to-moderate canal stenosis secondary to a  broad-based disc osteophyte complex and the retrolisthesis of C4 upon  C5. Moderate-to-severe foraminal if not severe stenosis is present  bilaterally, more prominent on the right secondary to uncovertebral  degenerative disease, loss of disc height and the retrolisthesis of C4  upon C5.     C5-C6: A mild central broad-based disc osteophyte complex is present  with no evidence of herniation. Mild and mild-to-moderate foraminal  stenosis is present on the right and left respectively secondary to  uncovertebral degenerative disease.     C6-C7: A right paracentral disc osteophyte complex is present which  abuts and mildly flattens the ventral surface of the cord. Mild  uncovertebral degenerative disease is present on the right.     C7-T1: There is no evidence of disc bulge or herniation.     There is partial visualization of left-sided pleural fluid collection  which is likely moderate in size.     IMPRESSION:  1.  There is no evidence of a focal herniation, of cord signal  abnormality or of cord compression. Multilevel degenerative disease  involving the cervical spine is noted as described above, including a  broad-based disc osteophyte complex at C4-C5 which, when combined with  posterior element degenerative disease and retrolisthesis of C4 upon C5  results in mild-to-moderate canal stenosis and a small right paracentral  disc osteophyte complex at C6-C7. Multilevel foraminal stenosis is  appreciated as described above including moderate-to-severe foraminal  stenosis on the right at C3-C4, moderate-to-severe if not severe  foraminal stenosis bilaterally at C4-C5 and mild-to-moderate foraminal  stenosis to the left  at C5-C6. See above.  2.  There is partial visualization of a pleural fluid collection on the  left which is likely moderate in size. Clinical correlation and a PA and  lateral view of the chest is recommended.     The above information was called to and discussed with Dr. Delong on  02/07/2024 at 1100 hours.     This report was finalized on 2/7/2024 5:38 PM by Dr. Alexx Belle M.D  on Workstation: [id number]        Assessment:   1.  Severe peripheral neuropathy associated with MGUS and diabetes- Appointment with Dr. Khoury Friday for further workup of MGUS.  Neuropathic pain fairly well-controlled on gabapentin.  Motor and sensory neuropathy panel negative for any antibodies attacking the nerves.  2.  Superimposed right femoral neuropathy  3.  Right knee pain-improvement with recent knee injection.  4.  Cervical spinal stenosis-appointment scheduled with Dr. Huang next week  5.  Gait disturbance      Plan:  1.  Continue gabapentin 300 mg 3 times a day.  Javier reviewed.  Patient denies any significant side effects other than some drowsiness in the afternoons.  2.  Keep appointments with Dr. Khoury and Dr. Huang  3.  Ideal targets for risk factor control would be Blood pressure < 130/80, B12 > 500, TSH in normal range and LDL < 70; HbA1c < 6.5 and smoking cessation if applicable. Call 911 for stroke symptoms.   4.  Follow-up in 3 months or sooner if needed        Time: Spent 50 minutes in total encounter time. This included time for chart review, obtaining history, performing pertinent physical examination, updating medical information, ordering tests/referrals, discussion of diagnosis, medical management, counseling, and encounter documentation.                  Dictated utilizing Dragon dictation.

## 2024-03-07 ENCOUNTER — TRANSCRIBE ORDERS (OUTPATIENT)
Dept: GENERAL RADIOLOGY | Facility: HOSPITAL | Age: 69
End: 2024-03-07
Payer: MEDICARE

## 2024-03-08 ENCOUNTER — CONSULT (OUTPATIENT)
Dept: ONCOLOGY | Facility: CLINIC | Age: 69
End: 2024-03-08
Payer: MEDICARE

## 2024-03-08 ENCOUNTER — LAB (OUTPATIENT)
Dept: OTHER | Facility: HOSPITAL | Age: 69
End: 2024-03-08
Payer: MEDICARE

## 2024-03-08 VITALS
WEIGHT: 136.2 LBS | RESPIRATION RATE: 18 BRPM | DIASTOLIC BLOOD PRESSURE: 76 MMHG | OXYGEN SATURATION: 91 % | TEMPERATURE: 97.7 F | SYSTOLIC BLOOD PRESSURE: 155 MMHG | HEIGHT: 67 IN | BODY MASS INDEX: 21.38 KG/M2 | HEART RATE: 65 BPM

## 2024-03-08 DIAGNOSIS — G63 NEUROPATHY ASSOCIATED WITH MGUS: Primary | ICD-10-CM

## 2024-03-08 DIAGNOSIS — D47.2 NEUROPATHY ASSOCIATED WITH MGUS: Primary | ICD-10-CM

## 2024-03-08 DIAGNOSIS — D47.2 MONOCLONAL GAMMOPATHY: Primary | ICD-10-CM

## 2024-03-08 LAB
BASOPHILS # BLD AUTO: 0.05 10*3/MM3 (ref 0–0.2)
BASOPHILS NFR BLD AUTO: 0.6 % (ref 0–1.5)
DEPRECATED RDW RBC AUTO: 43.2 FL (ref 37–54)
EOSINOPHIL # BLD AUTO: 0.18 10*3/MM3 (ref 0–0.4)
EOSINOPHIL NFR BLD AUTO: 2.3 % (ref 0.3–6.2)
ERYTHROCYTE [DISTWIDTH] IN BLOOD BY AUTOMATED COUNT: 14 % (ref 12.3–15.4)
HCT VFR BLD AUTO: 37.8 % (ref 34–46.6)
HGB BLD-MCNC: 12.1 G/DL (ref 12–15.9)
IMM GRANULOCYTES # BLD AUTO: 0.05 10*3/MM3 (ref 0–0.05)
IMM GRANULOCYTES NFR BLD AUTO: 0.6 % (ref 0–0.5)
LYMPHOCYTES # BLD AUTO: 2.37 10*3/MM3 (ref 0.7–3.1)
LYMPHOCYTES NFR BLD AUTO: 30.5 % (ref 19.6–45.3)
MCH RBC QN AUTO: 26.9 PG (ref 26.6–33)
MCHC RBC AUTO-ENTMCNC: 32 G/DL (ref 31.5–35.7)
MCV RBC AUTO: 84.2 FL (ref 79–97)
MONOCYTES # BLD AUTO: 0.75 10*3/MM3 (ref 0.1–0.9)
MONOCYTES NFR BLD AUTO: 9.7 % (ref 5–12)
NEUTROPHILS NFR BLD AUTO: 4.37 10*3/MM3 (ref 1.7–7)
NEUTROPHILS NFR BLD AUTO: 56.3 % (ref 42.7–76)
NRBC BLD AUTO-RTO: 0 /100 WBC (ref 0–0.2)
PLATELET # BLD AUTO: 251 10*3/MM3 (ref 140–450)
PMV BLD AUTO: 10.1 FL (ref 6–12)
RBC # BLD AUTO: 4.49 10*6/MM3 (ref 3.77–5.28)
WBC NRBC COR # BLD AUTO: 7.77 10*3/MM3 (ref 3.4–10.8)

## 2024-03-08 PROCEDURE — 1126F AMNT PAIN NOTED NONE PRSNT: CPT | Performed by: INTERNAL MEDICINE

## 2024-03-08 PROCEDURE — 3078F DIAST BP <80 MM HG: CPT | Performed by: INTERNAL MEDICINE

## 2024-03-08 PROCEDURE — 3077F SYST BP >= 140 MM HG: CPT | Performed by: INTERNAL MEDICINE

## 2024-03-08 PROCEDURE — 1159F MED LIST DOCD IN RCRD: CPT | Performed by: INTERNAL MEDICINE

## 2024-03-08 PROCEDURE — 85025 COMPLETE CBC W/AUTO DIFF WBC: CPT | Performed by: INTERNAL MEDICINE

## 2024-03-08 PROCEDURE — 1160F RVW MEDS BY RX/DR IN RCRD: CPT | Performed by: INTERNAL MEDICINE

## 2024-03-08 PROCEDURE — 99204 OFFICE O/P NEW MOD 45 MIN: CPT | Performed by: INTERNAL MEDICINE

## 2024-03-08 PROCEDURE — 36415 COLL VENOUS BLD VENIPUNCTURE: CPT

## 2024-03-08 NOTE — PROGRESS NOTES
REFERRING PROVIDER:    Charles Delong MD  [address] [address]    REASON FOR CONSULTATION:    IgG kappa monoclonal gammopathy    HISTORY OF PRESENT ILLNESS:  Opal Gutierrez is a 68 y.o. female who is referred today for further evaluation of IgG kappa monoclonal gammopathy.    She has longstanding type 1 diabetes and peripheral neuropathy.  She recently saw Dr. Delong with neurology for further evaluation.  Serum protein electrophoresis with immunofixation on 1 1/26/2024 showed an IgG kappa monoclonal protein with an IgG level of 1988, 0.7 g M spike.  Hemoglobin A1c was 10.8%.    She notes difficulty controlling her blood glucose levels but this is better with the new device.  She complains of numbness in her feet.  She has additional neuropathic pain in the right lower extremity.  She has difficulty ambulating.        Past Medical History:   Diagnosis Date    Acute metabolic encephalopathy 05/07/2021    Anemia     Anxiety     Arthritis     Benign hypertension 11/07/2022    Cirrhosis     Coronary artery disease     Depression     Diabetes mellitus type I     Diabetic ketoacidosis with coma associated with type 2 diabetes mellitus 01/03/2021    Esophageal varices     Genital HSV     2 times per year    GERD (gastroesophageal reflux disease)     Heart attack 10/2020    Heart murmur     Hip fracture     right- 2012, left 2013    Hyperlipidemia     Insulin pump titration 03/27/2016    Kidney stone     Myocardial infarction     IRVIN (nonalcoholic steatohepatitis)     Neuropathy     Osteopenia     Osteoporosis     managed by gynecologist.  On Reclast yearly    Osteoporosis, postmenopausal 07/22/2016    Pancreatitis        Past Surgical History:   Procedure Laterality Date    CAROTID STENT  10/2020    CATARACT EXTRACTION      COLONOSCOPY  2019    normal.    CORONARY STENT PLACEMENT      ENDOSCOPY      HAND SURGERY      for fracture    HIP SURGERY Left     JOINT REPLACEMENT  hip right    TOTAL HIP  "ARTHROPLASTY Right        SOCIAL HISTORY:   reports that she has never smoked. She has never used smokeless tobacco. She reports that she does not currently use alcohol. She reports that she does not use drugs.    FAMILY HISTORY:  family history includes Alzheimer's disease in her mother; Cancer in her father; Colon cancer (age of onset: 80) in her maternal grandmother; Diabetes in her father; Heart disease in her brother and father.    ALLERGIES:  Allergies   Allergen Reactions    Contrast Dye (Echo Or Unknown Ct/Mr) Other (See Comments)     Terrible back pains    Iodinated Contrast Media Unknown (See Comments)     SEVERE BACK PAIN  SEVERE BACK PAIN  SEVERE BACK PAIN    Penicillins Hives       MEDICATIONS:  The medication list has been reviewed with the patient by the medical assistant, and the list has been updated in the electronic medical record, which I reviewed.  Medication dosages and frequencies were confirmed to be accurate.    Review of Systems   Neurological:  Positive for numbness.   All other systems reviewed and are negative.      PHYSICAL EXAMINATION:  Vitals:    03/08/24 1443   BP: 155/76   Pulse: 65   Resp: 18   Temp: 97.7 °F (36.5 °C)   TempSrc: Temporal   SpO2: 91%   Weight: 61.8 kg (136 lb 3.2 oz)   Height: 170.2 cm (67.01\")   PainSc: 0-No pain       Physical Exam  Vitals reviewed.   Constitutional:       Appearance: She is well-developed.      Comments: Ambulates with a rolling walker   HENT:      Head: Normocephalic and atraumatic.      Nose: Nose normal.   Eyes:      Conjunctiva/sclera: Conjunctivae normal.      Pupils: Pupils are equal, round, and reactive to light.   Cardiovascular:      Rate and Rhythm: Normal rate and regular rhythm.      Heart sounds: S1 normal and S2 normal. Murmur heard.      Systolic murmur is present with a grade of 3/6.      No friction rub. No gallop.   Pulmonary:      Effort: Pulmonary effort is normal. No respiratory distress.      Breath sounds: Normal breath " sounds. No stridor. No wheezing, rhonchi or rales.   Chest:      Chest wall: No tenderness.   Abdominal:      General: Bowel sounds are normal. There is no distension.      Palpations: Abdomen is soft. There is no mass.      Tenderness: There is no abdominal tenderness. There is no guarding or rebound.   Musculoskeletal:         General: Normal range of motion.      Cervical back: Neck supple.   Lymphadenopathy:      Cervical: No cervical adenopathy.      Upper Body:      Right upper body: No supraclavicular adenopathy.      Left upper body: No supraclavicular adenopathy.   Skin:     General: Skin is warm and dry.      Findings: No erythema or rash.   Neurological:      Mental Status: She is alert and oriented to person, place, and time.   Psychiatric:         Behavior: Behavior normal.         Thought Content: Thought content normal.         Judgment: Judgment normal.         DIAGNOSTIC DATA:  CBC & Differential (03/08/2024 14:26)     IMAGING:    None reviewed    ASSESSMENT:  This is a 68 y.o. female with:    *Poorly controlled type 1 diabetes    *Peripheral neuropathy secondary to type 1 diabetes    *IgG kappa monoclonal gammopathy  Serum protein electrophoresis with immunofixation on 1/26/2024 with a 0.7 g IgG kappa monoclonal protein.  IgG 1988.  Almost certainly MGUS and extremely doubtful that this is contributing to neuropathy    *Left pleural effusion, scheduled for left thoracentesis    PLAN:   Further evaluation and treatment of peripheral neuropathy per Dr. Delong  I will see her back in 6 months for follow-up with labs done 1 week prior    ORDERS PLACED DURING THIS ENCOUNTER  Orders Placed This Encounter   Procedures    Comprehensive Metabolic Panel     Standing Status:   Future     Standing Expiration Date:   3/8/2025     Order Specific Question:   Release to patient     Answer:   Routine Release [[id number]]    EDISON,PE and FLC, Serum     Standing Status:   Future     Standing Expiration Date:   3/8/2025      Order Specific Question:   Release to patient     Answer:   Routine Release [[id number]]    CBC & Differential     Standing Status:   Future     Standing Expiration Date:   3/8/2025     Order Specific Question:   Manual Differential     Answer:   No     Order Specific Question:   Release to patient     Answer:   Routine Release [[id number]]

## 2024-03-08 NOTE — LETTER
March 8, 2024     Charles Delong MD  [address] [place] [address]    Patient: Opal Gutierrez   YOB: 1955   Date of Visit: 3/8/2024     Dear Charles Delong MD:       Thank you for referring Opal Gutierrez to me for evaluation. Below are the relevant portions of my assessment and plan of care.    If you have questions, please do not hesitate to call me. I look forward to following Opal along with you.         Sincerely,        Gareth Khoury MD        CC: MD Peng Fountain Andrew, MD  03/08/24 1643  Sign when Signing Visit  REFERRING PROVIDER:    Charles Delong MD  [address] [place] [address]    REASON FOR CONSULTATION:    IgG kappa monoclonal gammopathy    HISTORY OF PRESENT ILLNESS:  Opal Gutierrez is a 68 y.o. female who is referred today for further evaluation of IgG kappa monoclonal gammopathy.    She has longstanding type 1 diabetes and peripheral neuropathy.  She recently saw Dr. Delong with neurology for further evaluation.  Serum protein electrophoresis with immunofixation on 1 1/26/2024 showed an IgG kappa monoclonal protein with an IgG level of 1988, 0.7 g M spike.  Hemoglobin A1c was 10.8%.    She notes difficulty controlling her blood glucose levels but this is better with the new device.  She complains of numbness in her feet.  She has additional neuropathic pain in the right lower extremity.  She has difficulty ambulating.        Past Medical History:   Diagnosis Date   • Acute metabolic encephalopathy 05/07/2021   • Anemia    • Anxiety    • Arthritis    • Benign hypertension 11/07/2022   • Cirrhosis    • Coronary artery disease    • Depression    • Diabetes mellitus type I    • Diabetic ketoacidosis with coma associated with type 2 diabetes mellitus 01/03/2021   • Esophageal varices    • Genital HSV     2 times per year   • GERD (gastroesophageal reflux disease)    • Heart attack 10/2020   • Heart murmur    • Hip fracture      "right- 2012, left 2013   • Hyperlipidemia    • Insulin pump titration 03/27/2016   • Kidney stone    • Myocardial infarction    • IRVIN (nonalcoholic steatohepatitis)    • Neuropathy    • Osteopenia    • Osteoporosis     managed by gynecologist.  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   • CORONARY STENT PLACEMENT     • ENDOSCOPY     • HAND SURGERY      for fracture   • HIP SURGERY Left    • JOINT REPLACEMENT  hip right   • TOTAL HIP ARTHROPLASTY Right        SOCIAL HISTORY:   reports that she has never smoked. She has never used smokeless tobacco. She reports that she does not currently use alcohol. She reports that she does not use drugs.    FAMILY HISTORY:  family history includes Alzheimer's disease in her mother; Cancer in her father; Colon cancer (age of onset: 80) in her maternal grandmother; Diabetes in her father; Heart disease in her brother and father.    ALLERGIES:  Allergies   Allergen Reactions   • Contrast Dye (Echo Or Unknown Ct/Mr) Other (See Comments)     Terrible back pains   • Iodinated Contrast Media Unknown (See Comments)     SEVERE BACK PAIN  SEVERE BACK PAIN  SEVERE BACK PAIN   • Penicillins Hives       MEDICATIONS:  The medication list has been reviewed with the patient by the medical assistant, and the list has been updated in the electronic medical record, which I reviewed.  Medication dosages and frequencies were confirmed to be accurate.    Review of Systems   Neurological:  Positive for numbness.   All other systems reviewed and are negative.      PHYSICAL EXAMINATION:  Vitals:    03/08/24 1443   BP: 155/76   Pulse: 65   Resp: 18   Temp: 97.7 °F (36.5 °C)   TempSrc: Temporal   SpO2: 91%   Weight: 61.8 kg (136 lb 3.2 oz)   Height: 170.2 cm (67.01\")   PainSc: 0-No pain       Physical Exam  Vitals reviewed. "   Constitutional:       Appearance: She is well-developed.      Comments: Ambulates with a rolling walker   HENT:      Head: Normocephalic and atraumatic.      Nose: Nose normal.   Eyes:      Conjunctiva/sclera: Conjunctivae normal.      Pupils: Pupils are equal, round, and reactive to light.   Cardiovascular:      Rate and Rhythm: Normal rate and regular rhythm.      Heart sounds: Normal heart sounds, S1 normal and S2 normal. No murmur heard.     No friction rub. No gallop.   Pulmonary:      Effort: Pulmonary effort is normal. No respiratory distress.      Breath sounds: Normal breath sounds. No stridor. No wheezing, rhonchi or rales.   Chest:      Chest wall: No tenderness.   Abdominal:      General: Bowel sounds are normal. There is no distension.      Palpations: Abdomen is soft. There is no mass.      Tenderness: There is no abdominal tenderness. There is no guarding or rebound.   Musculoskeletal:         General: Normal range of motion.      Cervical back: Neck supple.   Lymphadenopathy:      Cervical: No cervical adenopathy.      Upper Body:      Right upper body: No supraclavicular adenopathy.      Left upper body: No supraclavicular adenopathy.   Skin:     General: Skin is warm and dry.      Findings: No erythema or rash.   Neurological:      Mental Status: She is alert and oriented to person, place, and time.   Psychiatric:         Behavior: Behavior normal.         Thought Content: Thought content normal.         Judgment: Judgment normal.         DIAGNOSTIC DATA:  CBC & Differential (03/08/2024 14:26)     IMAGING:    None reviewed    ASSESSMENT:  This is a 68 y.o. female with:    *Poorly controlled type 1 diabetes    *Peripheral neuropathy secondary to type 1 diabetes    *IgG kappa monoclonal gammopathy  Serum protein electrophoresis with immunofixation on 1/26/2024 with a 0.7 g IgG kappa monoclonal protein.  IgG 1988.  Almost certainly MGUS and extremely doubtful that this is contributing to  neuropathy    PLAN:   Further evaluation and treatment of peripheral neuropathy per Dr. Delong  I will see her back in 6 months for follow-up with labs done 1 week prior    ORDERS PLACED DURING THIS ENCOUNTER  Orders Placed This Encounter   Procedures   • Comprehensive Metabolic Panel     Standing Status:   Future     Standing Expiration Date:   3/8/2025     Order Specific Question:   Release to patient     Answer:   Routine Release [[id number]]   • EDISON,PE and FLC, Serum     Standing Status:   Future     Standing Expiration Date:   3/8/2025     Order Specific Question:   Release to patient     Answer:   Routine Release [[id number]]   • CBC & Differential     Standing Status:   Future     Standing Expiration Date:   3/8/2025     Order Specific Question:   Manual Differential     Answer:   No     Order Specific Question:   Release to patient     Answer:   Routine Release [[id number]]

## 2024-03-14 ENCOUNTER — HOSPITAL ENCOUNTER (OUTPATIENT)
Dept: ULTRASOUND IMAGING | Facility: HOSPITAL | Age: 69
Discharge: HOME | End: 2024-03-14
Payer: MEDICARE

## 2024-03-14 VITALS
HEART RATE: 62 BPM | OXYGEN SATURATION: 97 % | BODY MASS INDEX: 19.99 KG/M2 | HEIGHT: 69 IN | WEIGHT: 135 LBS | TEMPERATURE: 98 F | RESPIRATION RATE: 18 BRPM | SYSTOLIC BLOOD PRESSURE: 154 MMHG | DIASTOLIC BLOOD PRESSURE: 77 MMHG

## 2024-03-14 DIAGNOSIS — J90 PLEURAL EFFUSION ON LEFT: ICD-10-CM

## 2024-03-14 LAB
ALBUMIN FLD-MCNC: 2.8 G/DL
AMYLASE FLD-CCNC: 45 U/L
APPEARANCE FLD: ABNORMAL
CHOLESTEROL FLUID: 55 MG/DL
COLOR FLD: YELLOW
GLUCOSE FLD-MCNC: 135 MG/DL
INR PPP: 1.1 (ref 0.8–1.2)
LDH FLD-CCNC: 157 U/L
LYMPHOCYTES NFR FLD MANUAL: 82 %
METHOD: ABNORMAL
MONOCYTES NFR FLD: 1 %
MONOS+MACROS NFR FLD: 17 %
NUC CELL # FLD: 1793 /MM3
PH FLD: 7.55 [PH]
PROT FLD-MCNC: 4.4 G/DL
PROTHROMBIN TIME: 13.5 SECONDS (ref 12.8–15.2)
RBC # FLD AUTO: 895 /MM3

## 2024-03-14 PROCEDURE — 25010000002 LIDOCAINE 1 % SOLUTION: Performed by: NURSE PRACTITIONER

## 2024-03-14 PROCEDURE — 87205 SMEAR GRAM STAIN: CPT | Performed by: FAMILY MEDICINE

## 2024-03-14 PROCEDURE — 88112 CYTOPATH CELL ENHANCE TECH: CPT | Performed by: FAMILY MEDICINE

## 2024-03-14 PROCEDURE — 82150 ASSAY OF AMYLASE: CPT | Performed by: FAMILY MEDICINE

## 2024-03-14 PROCEDURE — 87070 CULTURE OTHR SPECIMN AEROBIC: CPT | Performed by: FAMILY MEDICINE

## 2024-03-14 PROCEDURE — 84311 SPECTROPHOTOMETRY: CPT | Performed by: FAMILY MEDICINE

## 2024-03-14 PROCEDURE — 83986 ASSAY PH BODY FLUID NOS: CPT | Performed by: FAMILY MEDICINE

## 2024-03-14 PROCEDURE — 88305 TISSUE EXAM BY PATHOLOGIST: CPT | Performed by: FAMILY MEDICINE

## 2024-03-14 PROCEDURE — 82042 OTHER SOURCE ALBUMIN QUAN EA: CPT | Performed by: FAMILY MEDICINE

## 2024-03-14 PROCEDURE — 76942 ECHO GUIDE FOR BIOPSY: CPT

## 2024-03-14 PROCEDURE — 89051 BODY FLUID CELL COUNT: CPT | Performed by: FAMILY MEDICINE

## 2024-03-14 PROCEDURE — 82945 GLUCOSE OTHER FLUID: CPT | Performed by: FAMILY MEDICINE

## 2024-03-14 PROCEDURE — 87116 MYCOBACTERIA CULTURE: CPT | Performed by: FAMILY MEDICINE

## 2024-03-14 PROCEDURE — 87075 CULTR BACTERIA EXCEPT BLOOD: CPT | Performed by: FAMILY MEDICINE

## 2024-03-14 PROCEDURE — 87206 SMEAR FLUORESCENT/ACID STAI: CPT | Performed by: FAMILY MEDICINE

## 2024-03-14 PROCEDURE — 83615 LACTATE (LD) (LDH) ENZYME: CPT | Performed by: FAMILY MEDICINE

## 2024-03-14 PROCEDURE — 84157 ASSAY OF PROTEIN OTHER: CPT | Performed by: FAMILY MEDICINE

## 2024-03-14 PROCEDURE — 85610 PROTHROMBIN TIME: CPT

## 2024-03-14 RX ORDER — SODIUM CHLORIDE 0.9 % (FLUSH) 0.9 %
10 SYRINGE (ML) INJECTION AS NEEDED
Status: DISCONTINUED | OUTPATIENT
Start: 2024-03-14 | End: 2024-03-15 | Stop reason: HOSPADM

## 2024-03-14 RX ORDER — LIDOCAINE HYDROCHLORIDE 10 MG/ML
10 INJECTION, SOLUTION INFILTRATION; PERINEURAL ONCE
Status: COMPLETED | OUTPATIENT
Start: 2024-03-14 | End: 2024-03-14

## 2024-03-14 RX ADMIN — LIDOCAINE HYDROCHLORIDE 2 ML: 10 INJECTION, SOLUTION INFILTRATION; PERINEURAL at 11:54

## 2024-03-14 NOTE — DISCHARGE INSTRUCTIONS
EDUCATION /DISCHARGE INSTRUCTIONS Thoracentesis:  A thoracentesis is a procedure to remove fluid or air from the space between the lung and it's lining.  It is done to relieve lung compression and respiratory distress.  A sample can also be obtained to aid diagnosis.   Medications can be administered into the space.      During the procedure:  You will be seated comfortable leaning forward onto a pillow supported by a table.  The area will be cleaned with antiseptic soap and draped with a sterile towel.  The physician will administer a local antiseptic to numb the area.  Next, the physician will insert a needle with tubing attached into the space between the lung and lining.  Fluid is removed and a sample sent to the laboratory.   When completed a pressure dressing is applied to the site.    Risks of the procedure include but are not limited to:   *  Bleeding    *  Low blood pressure *  Infection     *  Lung collapse possibly requiring insertion of a tube to re-inflate the lung.    Benefits of the procedure:  Relief of respiratory distress and facilitation of a diagnosis.  Alternatives to the procedure:  Drug therapy with diuretics to remove fluid.  Risks of diuretic drug therapy include possible dehydration and renal failure.  The benefit of drug therapy is that it can be done at home under physician supervision.  THIS EDUCATION INFORMATION WAS REVIEWED PRIOR TO THE PROCEDURE AND CONSENT.     Post Procedure:    *  Rest today (no pushing pulling, straining or heavy lifting).   *  Slowly increase activity tomorow.    *  If you received sedation do not drive for 24 hours.   *  Keep dressing clean and dry.   *  Leave dressing on puncture site for 24 hours.    *  You may shower when dressing removed.   *  Expect some coughing as the lung re-expands.    Call your doctor if experiencing:   *  If experiencing sudden / severe shortness of breath, chest pain or if coughing       up blood go to the nearest emergency room.   *   Signs of infection such as redness, swelling, excessive pain and / or foul       smelling drainage from the puncture site.   *  Chills or fever over 101 degrees (by mouth).   *  Change in sputum color (yellow, green, red).   *  Unrelieved pain.   *  Any new or severe symptoms.  Following the procedure:     Follow-up with the ordering physician as directed.    Continue to take other medications as directed by your physician unless    otherwise instructed.   If applicable, resume taking your blood thinners or Aspirin TODAY.    If you have any concerns please call the Radiology Nurses Desk at [phone].  You are the most important factor in your recovery.  Follow the above instructions carefully.

## 2024-03-15 DIAGNOSIS — J90 PLEURAL EFFUSION ON LEFT: Primary | ICD-10-CM

## 2024-03-15 LAB — NIGHT BLUE STAIN TISS: NORMAL

## 2024-03-17 LAB — BACTERIA SPEC ANAEROBE CULT: NORMAL

## 2024-03-18 LAB
CYTO UR: NORMAL
LAB AP CASE REPORT: NORMAL
PATH REPORT.FINAL DX SPEC: NORMAL
PATH REPORT.GROSS SPEC: NORMAL

## 2024-03-19 ENCOUNTER — PATIENT MESSAGE (OUTPATIENT)
Dept: FAMILY MEDICINE CLINIC | Facility: CLINIC | Age: 69
End: 2024-03-19
Payer: MEDICARE

## 2024-03-19 LAB
BACTERIA FLD CULT: NORMAL
BACTERIA SPEC ANAEROBE CULT: NORMAL
GRAM STN SPEC: NORMAL
GRAM STN SPEC: NORMAL

## 2024-03-19 NOTE — PROGRESS NOTES
Subjective   Patient ID: Opal Gutierrez is a 68 y.o. female is being seen for consultation today at the request of Charles Delong MD for cervical spinal stenosis. Patient had a MRI C-spine on 2/6/2024 @ [facility]    Treatment: No recent treatment     Today Patient states that she has L sided neck pain    History of Present Illness    This patient has been having pain in her neck for several years.  The pain gets worse when she moves her head around almost any direction.  She does not have any radiation down her arms.  He has no difficulty with bowel bladder control or other associated symptoms.    The following portions of the patient's history were reviewed and updated as appropriate: allergies, current medications, past family history, past medical history, past social history, past surgical history, and problem list.    Review of Systems   Musculoskeletal:  Positive for neck pain and neck stiffness. Negative for myalgias.   Neurological:  Negative for dizziness, light-headedness and headaches.       I reviewed the review of systems listed by the patient and discussed by my MA    Objective     There were no vitals filed for this visit.  There is no height or weight on file to calculate BMI.    Tobacco Use: Low Risk  (3/21/2024)    Patient History     Smoking Tobacco Use: Never     Smokeless Tobacco Use: Never     Passive Exposure: Not on file          Physical Exam  Constitutional:       Appearance: She is well-developed.   HENT:      Head: Normocephalic and atraumatic.   Eyes:      Extraocular Movements: EOM normal.      Conjunctiva/sclera: Conjunctivae normal.      Pupils: Pupils are equal, round, and reactive to light.   Neck:      Vascular: No carotid bruit.   Neurological:      Mental Status: She is oriented to person, place, and time.      Coordination: Finger-Nose-Finger Test and Heel to Shin Test normal.      Gait: Gait is intact.      Deep Tendon Reflexes:      Reflex Scores:       Tricep reflexes  are 2+ on the right side and 2+ on the left side.       Bicep reflexes are 2+ on the right side and 2+ on the left side.       Brachioradialis reflexes are 2+ on the right side and 2+ on the left side.       Patellar reflexes are 2+ on the right side and 2+ on the left side.       Achilles reflexes are 2+ on the right side and 2+ on the left side.  Psychiatric:         Speech: Speech normal.       Neurologic Exam     Mental Status   Oriented to person, place, and time.   Registration of memory: Good recent and remote memory.   Attention: normal. Concentration: normal.   Speech: speech is normal   Level of consciousness: alert  Knowledge: consistent with education.     Cranial Nerves     CN II   Visual fields full to confrontation.   Visual acuity: normal    CN III, IV, VI   Pupils are equal, round, and reactive to light.  Extraocular motions are normal.     CN V   Facial sensation intact.   Right corneal reflex: normal  Left corneal reflex: normal    CN VII   Facial expression full, symmetric.   Right facial weakness: none  Left facial weakness: none    CN VIII   Hearing: intact    CN IX, X   Palate: symmetric    CN XI   Right sternocleidomastoid strength: normal  Left sternocleidomastoid strength: normal    CN XII   Tongue: not atrophic  Tongue deviation: none    Motor Exam   Muscle bulk: normal  Right arm tone: normal  Left arm tone: normal  Right leg tone: normal  Left leg tone: normal    Strength   Strength 5/5 except as noted.     Sensory Exam   Light touch normal.     Gait, Coordination, and Reflexes     Gait  Gait: normal    Coordination   Finger to nose coordination: normal  Heel to shin coordination: normal    Reflexes   Right brachioradialis: 2+  Left brachioradialis: 2+  Right biceps: 2+  Left biceps: 2+  Right triceps: 2+  Left triceps: 2+  Right patellar: 2+  Left patellar: 2+  Right achilles: 2+  Left achilles: 2+  Right : 2+  Left : 2+          Assessment & Plan   Independent Review of  Radiographic Studies:      I personally reviewed the images from the following studies.    I reviewed an MRI of the cervical spine done on 6 February of this year.  This does show some spondylitic change and some narrowing of the spinal canal on the sagittal images particularly at C3-4 and C4-5.  On the axial images C2-3 is widely open.  C3-4 does show some flattening of the cord but no cord compression.  There is foraminal stenosis at that level.  C4-5 shows more significant central narrowing with some actual cord compression as well as foraminal stenosis.  C5-6 shows a little narrowing.  Particularly in the central canal.  C6-7 shows a right-sided disc bulge that is distorting the cord.  C7-T1 shows some left-sided spondylitic spur formation causing foraminal stenosis.    I also reviewed an EMG that was done last November.  This shows a severe peripheral neuropathy in her lower extremities.    Medical Decision Making:      I told the patient that based on her current symptoms I would not recommend any kind of surgery.  I did suggest that we consider some cervical epidural blocks and cervical physical therapy.  She agrees.  She will let me know if anything gets worse.    Diagnoses and all orders for this visit:    1. Cervical spinal stenosis (Primary)  -     Epidural Block  -     Ambulatory Referral to Physical Therapy      Return if symptoms worsen or fail to improve.

## 2024-03-20 NOTE — TELEPHONE ENCOUNTER
From: Elmer MAI  To: Opal CAIReid Brenda  Sent: 3/19/2024 9:46 AM EDT  Subject: Xray    Mrs. Gutierrez,     Dr. Lozano ordered a Chest Xray back in February, have you gotten this done yet?    Thanks

## 2024-03-21 ENCOUNTER — HOSPITAL ENCOUNTER (OUTPATIENT)
Dept: GENERAL RADIOLOGY | Facility: HOSPITAL | Age: 69
Discharge: HOME | End: 2024-03-21
Admitting: FAMILY MEDICINE
Payer: MEDICARE

## 2024-03-21 ENCOUNTER — OFFICE VISIT (OUTPATIENT)
Dept: NEUROSURGERY | Facility: CLINIC | Age: 69
End: 2024-03-21
Payer: MEDICARE

## 2024-03-21 DIAGNOSIS — J90 PLEURAL EFFUSION ON LEFT: ICD-10-CM

## 2024-03-21 DIAGNOSIS — M48.02 CERVICAL SPINAL STENOSIS: Primary | ICD-10-CM

## 2024-03-21 LAB
MYCOBACTERIUM SPEC CULT: NORMAL
NIGHT BLUE STAIN TISS: NORMAL

## 2024-03-21 PROCEDURE — 1160F RVW MEDS BY RX/DR IN RCRD: CPT | Performed by: NEUROLOGICAL SURGERY

## 2024-03-21 PROCEDURE — 99204 OFFICE O/P NEW MOD 45 MIN: CPT | Performed by: NEUROLOGICAL SURGERY

## 2024-03-21 PROCEDURE — 1159F MED LIST DOCD IN RCRD: CPT | Performed by: NEUROLOGICAL SURGERY

## 2024-03-21 PROCEDURE — 71045 X-RAY EXAM CHEST 1 VIEW: CPT

## 2024-03-28 LAB
MYCOBACTERIUM SPEC CULT: NORMAL
NIGHT BLUE STAIN TISS: NORMAL

## 2024-04-04 LAB
MYCOBACTERIUM SPEC CULT: NORMAL
NIGHT BLUE STAIN TISS: NORMAL

## 2024-04-11 LAB
MYCOBACTERIUM SPEC CULT: NORMAL
NIGHT BLUE STAIN TISS: NORMAL

## 2024-04-16 ENCOUNTER — ANESTHESIA EVENT (OUTPATIENT)
Dept: PAIN MEDICINE | Facility: HOSPITAL | Age: 69
End: 2024-04-16
Payer: MEDICARE

## 2024-04-16 ENCOUNTER — HOSPITAL ENCOUNTER (OUTPATIENT)
Dept: PAIN MEDICINE | Facility: HOSPITAL | Age: 69
Discharge: HOME | End: 2024-04-16
Payer: MEDICARE

## 2024-04-16 ENCOUNTER — ANESTHESIA (OUTPATIENT)
Dept: PAIN MEDICINE | Facility: HOSPITAL | Age: 69
End: 2024-04-16
Payer: MEDICARE

## 2024-04-16 ENCOUNTER — HOSPITAL ENCOUNTER (OUTPATIENT)
Dept: GENERAL RADIOLOGY | Facility: HOSPITAL | Age: 69
Discharge: HOME | End: 2024-04-16
Payer: MEDICARE

## 2024-04-16 VITALS
BODY MASS INDEX: 19.26 KG/M2 | RESPIRATION RATE: 16 BRPM | HEART RATE: 68 BPM | OXYGEN SATURATION: 96 % | SYSTOLIC BLOOD PRESSURE: 127 MMHG | HEIGHT: 69 IN | WEIGHT: 130 LBS | DIASTOLIC BLOOD PRESSURE: 68 MMHG | TEMPERATURE: 97.9 F

## 2024-04-16 DIAGNOSIS — G89.29 CHRONIC PAIN OF RIGHT KNEE: Primary | ICD-10-CM

## 2024-04-16 DIAGNOSIS — M25.561 CHRONIC PAIN OF RIGHT KNEE: Primary | ICD-10-CM

## 2024-04-16 DIAGNOSIS — M48.02 CERVICAL SPINAL STENOSIS: Primary | ICD-10-CM

## 2024-04-16 DIAGNOSIS — R52 PAIN: ICD-10-CM

## 2024-04-16 PROCEDURE — 25010000002 DEXAMETHASONE SODIUM PHOSPHATE 10 MG/ML SOLUTION: Performed by: ANESTHESIOLOGY

## 2024-04-16 PROCEDURE — 77003 FLUOROGUIDE FOR SPINE INJECT: CPT

## 2024-04-16 PROCEDURE — 25010000002 MIDAZOLAM PER 1 MG: Performed by: ANESTHESIOLOGY

## 2024-04-16 RX ORDER — LIDOCAINE HYDROCHLORIDE 10 MG/ML
1 INJECTION, SOLUTION INFILTRATION; PERINEURAL ONCE AS NEEDED
Status: DISCONTINUED | OUTPATIENT
Start: 2024-04-16 | End: 2024-04-17 | Stop reason: HOSPADM

## 2024-04-16 RX ORDER — SODIUM CHLORIDE 0.9 % (FLUSH) 0.9 %
10 SYRINGE (ML) INJECTION EVERY 12 HOURS SCHEDULED
Status: DISCONTINUED | OUTPATIENT
Start: 2024-04-16 | End: 2024-04-17 | Stop reason: HOSPADM

## 2024-04-16 RX ORDER — SODIUM CHLORIDE 0.9 % (FLUSH) 0.9 %
10 SYRINGE (ML) INJECTION AS NEEDED
Status: DISCONTINUED | OUTPATIENT
Start: 2024-04-16 | End: 2024-04-17 | Stop reason: HOSPADM

## 2024-04-16 RX ORDER — SODIUM CHLORIDE 9 MG/ML
40 INJECTION, SOLUTION INTRAVENOUS AS NEEDED
Status: DISCONTINUED | OUTPATIENT
Start: 2024-04-16 | End: 2024-04-17 | Stop reason: HOSPADM

## 2024-04-16 RX ORDER — IOPAMIDOL 408 MG/ML
12 INJECTION, SOLUTION INTRATHECAL
Status: DISCONTINUED | OUTPATIENT
Start: 2024-04-16 | End: 2024-04-17 | Stop reason: HOSPADM

## 2024-04-16 RX ORDER — SODIUM CHLORIDE 0.9 % (FLUSH) 0.9 %
1-10 SYRINGE (ML) INJECTION AS NEEDED
Status: DISCONTINUED | OUTPATIENT
Start: 2024-04-16 | End: 2024-04-17 | Stop reason: HOSPADM

## 2024-04-16 RX ORDER — MIDAZOLAM HYDROCHLORIDE 1 MG/ML
1 INJECTION INTRAMUSCULAR; INTRAVENOUS AS NEEDED
Status: DISCONTINUED | OUTPATIENT
Start: 2024-04-16 | End: 2024-04-17 | Stop reason: HOSPADM

## 2024-04-16 RX ORDER — DEXAMETHASONE SODIUM PHOSPHATE 10 MG/ML
10 INJECTION, SOLUTION INTRAMUSCULAR; INTRAVENOUS ONCE
Status: COMPLETED | OUTPATIENT
Start: 2024-04-16 | End: 2024-04-16

## 2024-04-16 RX ORDER — FENTANYL CITRATE 50 UG/ML
50 INJECTION, SOLUTION INTRAMUSCULAR; INTRAVENOUS AS NEEDED
Status: DISCONTINUED | OUTPATIENT
Start: 2024-04-16 | End: 2024-04-17 | Stop reason: HOSPADM

## 2024-04-16 RX ADMIN — DEXAMETHASONE SODIUM PHOSPHATE 10 MG: 10 INJECTION, SOLUTION INTRAMUSCULAR; INTRAVENOUS at 12:39

## 2024-04-16 RX ADMIN — MIDAZOLAM 1 MG: 1 INJECTION INTRAMUSCULAR; INTRAVENOUS at 12:38

## 2024-04-16 NOTE — H&P
HPI:  Complains of cervical neck pain.  Worse with activity.  She has an MRI which shows spinal stenosis.  Referred for cervical epidural.  Conservative therapy includes gabapentin  Current Outpatient Medications on File Prior to Encounter   Medication Sig Dispense Refill    aspirin 81 MG EC tablet Take 1 tablet by mouth Daily.      atorvastatin (LIPITOR) 20 MG tablet Take 1 tablet by mouth Daily.      Cholecalciferol (Vitamin D3) 25 MCG (1000 UT) capsule TAKE 1 CAPSULE BY MOUTH DAILY IN ADDITION TO VITAMIN D3 5000 UNITS DAILY 90 capsule 1    CREON 12638-90729 units capsule delayed-release particles capsule TK 2 CS PO TID  3    FLUoxetine (PROzac) 20 MG capsule TAKE 1 CAPSULE BY MOUTH EVERY DAY 30 capsule 6    furosemide (LASIX) 20 MG tablet TAKE 1 TABLET BY MOUTH EVERY DAY AS NEEDED FOR SWELLING 15 tablet 0    Insulin Lispro (HumaLOG) 100 UNIT/ML injection 70u/day via insulin pump e10.65 63 mL 2    pantoprazole (PROTONIX) 40 MG EC tablet Take 1 tablet by mouth Daily.      spironolactone (ALDACTONE) 25 MG tablet TK 1/2 T PO D      traZODone (DESYREL) 150 MG tablet TAKE 1 TABLET BY MOUTH EVERY NIGHT AT BEDTIME 90 tablet 3    Accu-Chek FastClix Lancets misc Dx code E10.65 Testing bs 4 times daily 408 each 1    Accu-Chek Guide test strip Dx code E10.65 testing bs 4 x day 400 each 1    Acetone, Urine, Test (Ketone Test) strip 1 strip by Other route Daily. 100 strip 3    gabapentin (NEURONTIN) 300 MG capsule 1 tab nightly for 1 week then 1 tab twice daily for 1 week then 1 tab 3 times per day 90 capsule 5    glucagon (GLUCAGEN) 1 MG injection Inject 1 mg into the appropriate muscle as directed by prescriber See Admin Instructions. For emergency use for severe low glucose: first inject, then call 911, once awake try to give oral treatment. 1 kit 3    Insulin Infusion Pump device Inject 1 each under the skin into the appropriate area as directed.       Current Facility-Administered Medications on File Prior to Encounter    Medication Dose Route Frequency Provider Last Rate Last Admin    zoledronic acid (RECLAST) infusion 5 mg  5 mg Intravenous Once Grayson Levi MD           Past Medical History:   Diagnosis Date    Acute metabolic encephalopathy 05/07/2021    Anemia     Anxiety     Arthritis     Benign hypertension 11/07/2022    Cirrhosis     Coronary artery disease     Depression     Diabetes mellitus type I     Diabetic ketoacidosis with coma associated with type 2 diabetes mellitus 01/03/2021    Esophageal varices     Genital HSV     2 times per year    GERD (gastroesophageal reflux disease)     Heart attack 10/2020    Heart murmur     Hip fracture     right- 2012, left 2013    Hyperlipidemia     Insulin pump titration 03/27/2016    Kidney stone     Myocardial infarction     IRVIN (nonalcoholic steatohepatitis)     Neuropathy     Osteopenia     Osteoporosis     managed by gynecologist.  On Reclast yearly    Osteoporosis, postmenopausal 07/22/2016    Pancreatitis        Negative screen for BELA  Review of systems negative for hematologic, infectious or constitutional symptoms    Exam:  There were no vitals taken for this visit.  Airway [place] 2  Alert and oriented    Diagnosis:    Post-Op Diagnosis Codes:     * Cervical radiculopathy [M54.12]    Plan:  Cervical 6 epidural steroid injection under fluoroscopic guidance    I have encouraged them to continue:  1.  Physical therapy exercises at home as prescribed by physical therapy or from the pain clinic handout.  Continuation of these exercises every day, or multiple times per week, even when the patient has good pain relief, was stressed to the patient as a preventative measure to decrease the frequency and severity of future pain episodes.  2.  Continue pain medicines as already prescribed.  If patient not currently taking any, it is recommended to begin Acetaminophen 1000 mg po q 8 hours.  If other medicines containing Acetaminophen are currently prescribed,  maintain daily dose at 3000mg.    3.  If they can tolerate NSAIDS, it is recommended to take Ibuprofen 600 mg po q 6 hours for 7 days during pain exacerbations.  Alternatively, they may substitute an NSAID of their choice (e.g. Aleve)  4.  Heat and ice to the affected area as tolerated for pain control.  5.  Low impact exercise such as walking or water exercise was recommended to maintain overall health and aid in weight control.   6.  Follow up as needed for subsequent injections.

## 2024-04-16 NOTE — DISCHARGE INSTRUCTIONS
EPIDURAL STEROID INJECTION          An epidural steroid injection is a shot of steroid medicine and numbing medicine that is given into the space between the spinal cord and the bones of the back (epidural space).  The injection helps relieve pain by an irritated or swollen nerve root.    TELL YOUR HEALTH CARE PROVIDER ABOUT:  Any allergies you have  All medicines you are taking including any over the counter medicines  Any blood disorders you have  Any surgeries you have had  Any medical conditions you have  Whether you are pregnant or may be pregnant    WHAT ARE THE RISK?  Generally, this is a safe procedure. However,problems may occur, including  Headache  Bleeding  Infection  Allergic Reaction  Nerve Damage    WHAT CAN I EXPECT AFTER THE PROCEDURE?    INJECTION SITE  Remove the Band-Aid/s after 24 hours  Check your injection site every day for signs of infection.  Check for:             Redness             Bleeding (small amt is normal)             Warmth             Pus or bad odor  Some numbness may be experienced for several hours following the procedure.  Avoid using heat on the injection site for 24 hours. You may use ice intermittently if needed by placing a         towel between your skin and the ice bag and using the ice for 20 minutes 2-3 times a day.  Do not take baths, swim or use a hot tub for 24 hours.    ACTIVITY  No strenuous activity for 24 hours then return to normal activity as tolerated.  If your leg is numb, no driving until full sensation and strength has returned.    GENERAL INSTRUCTIONS:  The injection site may feel numb, use ice with caution if numbness is present and no heat for 24 hours or until numbness is gone.   If you have numbness or weakness in your arm or leg, use those areas with caution until normal sensation returns.  It is not uncommon to notice an increase in discomfort or a change in the location of discomfort for 3-4 days after the procedure.  If discomfort is noticed  "at the injection site, ice may be            applied to that area for 20 min 2-3 times a day.  Take the pain medicine your physician has prescribed or over the counter pain relievers as long as you do not have any contraindications.  If you are a diabetic, monitor your blood sugar closely.  The steroids used in your procedure may increase your blood sugar level up to 36 hours after the injection.  If your blood sugar is greater than 250, call the physician that helps you monitor your blood sugar.  Keep all follow-up visits as scheduled by your health care provider. This is important.    CONTACT OUR OFFICE IF:  You have any of these signs of infection            -Redness, swelling, or warmth around your injection site.            -Fluid or blood coming from your injection site (small amt of blood is normal)            -Pus or a bad odor from your injection site            -A fever  You develop a severe headache or a stiff neck  You lose control of your bladder or bowel movements      [facility] HOURS   Monday-Friday 7:30 am. - 4:00 pm.  For any problem related to your procedure we can be reached at [phone].  If you experience an emergency with your procedure, call [phone] or go to the emergency room.                   Guide To Relieving And Avoiding Neck Pain    Exercise is important to help prevent and treat neck pain.  Good posture, exercise and avoiding injury will help to keep your neck healthy.    When the neck is strained or over worked symptoms may include headache, upper back pain, shoulder pain or arm pain.  Numbness or tingling in the fingers, dizziness or nausea may also occur.    Posture:    Avoid slumping over a desk.  Raise your work (including computer) to eye level to avoid bending at the neck.      Change Position Often:  Changing position prevents overuse of particular muscles.    Sleep On One Pillow:  Using to many pillows or to large of a pillow causes a \"kink\" in you " neck.      Move and Exercise:  Living an active lifestyle is an important part of staying healthy.  Be sure to include the exercise to follow specifically for your neck.                    Range of Motion Exercises:  Do these exercises three times a day.  If you experience increased pain stop and contact your physician.  All exercises can be performed sitting or standing.        1.    2.   3.    1.  Place both hands behind you neck.  Gently tilt your neck backward so that you are looking at the ceiling.  Hold for a count of 10.    2.  Look straight facing forward.  Slowly tip your ear toward your right shoulder.  Do not force the motion.  Hold for a count of 10.  Bring head back to starting position and repeat to left side.    3.  Look straight facing forward.  Gently turn your head to the right.  Do not force the motion.  Hold for a count of 10.  Bring head back to starting position and repeat to the left side.    Exercises To Strengthen Muscles:  1.  Look straight facing forward.  Relax your shoulders.  Raise both shoulders toward your ears.  Hold for 3 seconds.       1.       2.   3.      1.  Look straight facing forward.  Relax your shoulders.  Raise both shoulders toward     2.  Raise your ars to your side and bend your elbows.  Squeeze shoulder blades together as you rotate your arms outward.  Hold for 5 seconds.    3.  Look straight facing forward.  Pull your head straight back.  Do not tip or move your jaw.  Hold for 5 seconds.

## 2024-04-16 NOTE — ANESTHESIA PROCEDURE NOTES
PAIN Epidural block      Patient reassessed immediately prior to procedure    Patient location during procedure: pain clinic  Indication:procedure for pain  Performed By  Anesthesiologist: Lane Greenwood MD  Preanesthetic Checklist  Completed: patient identified and risks and benefits discussed  Additional Notes  Diagnosis:   Post-Op Diagnosis Codes:     * Cervical radiculopathy [M54.12]    Sedation:   Send 1 mg  Sedation time 12:33 PM to 12:48 PM  Under fluoroscopic guidance, the epidural space was identified and accessed, confirmed by loss of resistance to saline.  The above medications were injected uneventfully.    Prep:  Pt Position:prone  Sterile Tech:cap, gloves, mask and sterile barrier  Prep:chlorhexidine gluconate and isopropyl alcohol  Monitoring:blood pressure monitoring, continuous pulse oximetry and EKG  Procedure:Sedation: yes     Approach:midline  Guidance: fluoroscopy  Location:cervical  Level:6-7  Needle Type:Tuohy  Needle Gauge:20  Aspiration:negative  Medications:  Comments:Decadron 10 mg preservative-free  Post Assessment:  Pt Tolerance:patient tolerated the procedure well with no apparent complications  Complications:no

## 2024-04-18 LAB
MYCOBACTERIUM SPEC CULT: NORMAL
NIGHT BLUE STAIN TISS: NORMAL

## 2024-04-25 LAB
MYCOBACTERIUM SPEC CULT: NORMAL
NIGHT BLUE STAIN TISS: NORMAL

## 2024-05-07 DIAGNOSIS — I50.9 CONGESTIVE HEART FAILURE, UNSPECIFIED HF CHRONICITY, UNSPECIFIED HEART FAILURE TYPE: ICD-10-CM

## 2024-05-07 RX ORDER — FUROSEMIDE 20 MG/1
TABLET ORAL
Qty: 15 TABLET | Refills: 0 | Status: SHIPPED | OUTPATIENT
Start: 2024-05-07

## 2024-05-10 ENCOUNTER — TRANSCRIBE ORDERS (OUTPATIENT)
Dept: ADMINISTRATIVE | Facility: HOSPITAL | Age: 69
End: 2024-05-10
Payer: MEDICARE

## 2024-05-10 DIAGNOSIS — R91.1 LUNG NODULE: Primary | ICD-10-CM

## 2024-05-24 ENCOUNTER — OFFICE VISIT (OUTPATIENT)
Dept: ENDOCRINOLOGY | Age: 69
End: 2024-05-24
Payer: MEDICARE

## 2024-05-24 VITALS
OXYGEN SATURATION: 97 % | SYSTOLIC BLOOD PRESSURE: 112 MMHG | HEIGHT: 69 IN | BODY MASS INDEX: 20.06 KG/M2 | WEIGHT: 135.4 LBS | DIASTOLIC BLOOD PRESSURE: 58 MMHG | TEMPERATURE: 98.1 F | HEART RATE: 69 BPM

## 2024-05-24 DIAGNOSIS — E78.2 MIXED HYPERLIPIDEMIA: ICD-10-CM

## 2024-05-24 DIAGNOSIS — E10.65 TYPE 1 DIABETES MELLITUS WITH HYPERGLYCEMIA: Primary | ICD-10-CM

## 2024-05-24 DIAGNOSIS — Z86.79 HISTORY OF CAD (CORONARY ARTERY DISEASE): ICD-10-CM

## 2024-05-24 DIAGNOSIS — E10.42 DIABETIC PERIPHERAL NEUROPATHY ASSOCIATED WITH TYPE 1 DIABETES MELLITUS: ICD-10-CM

## 2024-05-24 DIAGNOSIS — Z96.41 INSULIN PUMP STATUS: ICD-10-CM

## 2024-05-24 PROCEDURE — 3074F SYST BP LT 130 MM HG: CPT | Performed by: NURSE PRACTITIONER

## 2024-05-24 PROCEDURE — 95251 CONT GLUC MNTR ANALYSIS I&R: CPT | Performed by: NURSE PRACTITIONER

## 2024-05-24 PROCEDURE — 99214 OFFICE O/P EST MOD 30 MIN: CPT | Performed by: NURSE PRACTITIONER

## 2024-05-24 PROCEDURE — 3078F DIAST BP <80 MM HG: CPT | Performed by: NURSE PRACTITIONER

## 2024-05-24 NOTE — PROGRESS NOTES
"Chief Complaint  Diabetes    Subjective        Opal Gutierrez presents to [facility]  History of Present Illness    Type 1 diabetes was diagnosed in her 20s and started insulin shortly after, has been on insulin pump since 2014  DM regimen: [employer]tr[employer] 780g with smart guard technology   Back up plan: insulin syringes   Glucagon: yes  Known DM complications:  neuropathy: on gabapentin, Uses a walker. + CAD; angioplasty with stent to the LAD October 2020; Ischemic Cardiomyopathy, on atorvastatin 20 mg    Has a diabetic eye exam in the next few weeks    Cgm review   High 28%  70% time in range  2% low  Smart guard 91%  Gmi 7%  TDD 53u  Bolus 68%         Objective   Vital Signs:  /58 (BP Location: Left arm, Patient Position: Sitting)   Pulse 69   Temp 98.1 °F (36.7 °C) (Oral)   Ht 175.3 cm (69.02\")   Wt 61.4 kg (135 lb 6.4 oz)   SpO2 97%   BMI 19.99 kg/m²   Estimated body mass index is 19.99 kg/m² as calculated from the following:    Height as of this encounter: 175.3 cm (69.02\").    Weight as of this encounter: 61.4 kg (135 lb 6.4 oz).       BMI is within normal parameters. No other follow-up for BMI required.      Physical Exam  Vitals reviewed.   Constitutional:       General: She is not in acute distress.  HENT:      Head: Normocephalic and atraumatic.   Cardiovascular:      Rate and Rhythm: Normal rate.   Pulmonary:      Effort: Pulmonary effort is normal. No respiratory distress.   Musculoskeletal:         General: No signs of injury. Normal range of motion.      Cervical back: Normal range of motion and neck supple.   Skin:     General: Skin is warm and dry.   Neurological:      Mental Status: She is alert and oriented to person, place, and time. Mental status is at baseline.      Motor: Weakness present.      Gait: Gait abnormal.   Psychiatric:         Mood and Affect: Mood normal.         Behavior: Behavior normal.         Thought Content: Thought content normal.    "      Judgment: Judgment normal.        Result Review :    The following data was reviewed by: KATELIN Malik on 05/24/2024:  Common labs          1/26/2024    15:15 3/8/2024    14:26 3/18/2024    14:34   Common Labs   Glucose   217    BUN   12    Creatinine   0.69    Sodium   137    Potassium   4.5    Chloride   99    Calcium   8.8    Total Protein 7.6   7.6    Albumin 3.3   3.8    Total Bilirubin   0.3    Alkaline Phosphatase   125    AST (SGOT)   34    ALT (SGPT)   26    WBC  7.77     Hemoglobin  12.1     Hematocrit  37.8     Platelets  251                    Assessment and Plan     Diagnoses and all orders for this visit:    1. Type 1 diabetes mellitus with hyperglycemia (Primary)  -     Hemoglobin A1c  -     Basic Metabolic Panel  -     Lipid Panel  -     Microalbumin / Creatinine Urine Ratio - Urine, Clean Catch    2. Diabetic peripheral neuropathy associated with type 1 diabetes mellitus    3. History of CAD (coronary artery disease)    4. Insulin pump status    5. Mixed hyperlipidemia             Follow Up     Return in about 3 months (around 8/24/2024).    Cgm reviewed, at goal  Labs today  Continue statin     Patient was given instructions and counseling regarding her condition or for health maintenance advice. Please see specific information pulled into the AVS if appropriate.     KATELIN Malik

## 2024-05-25 LAB
ALBUMIN/CREAT UR: 7 MG/G CREAT (ref 0–29)
BUN SERPL-MCNC: 7 MG/DL (ref 8–23)
BUN/CREAT SERPL: 8.9 (ref 7–25)
CALCIUM SERPL-MCNC: 9.3 MG/DL (ref 8.6–10.5)
CHLORIDE SERPL-SCNC: 93 MMOL/L (ref 98–107)
CHOLEST SERPL-MCNC: 115 MG/DL (ref 0–200)
CO2 SERPL-SCNC: 29.3 MMOL/L (ref 22–29)
CREAT SERPL-MCNC: 0.79 MG/DL (ref 0.57–1)
CREAT UR-MCNC: 74.2 MG/DL
EGFRCR SERPLBLD CKD-EPI 2021: 82.1 ML/MIN/1.73
GLUCOSE SERPL-MCNC: 121 MG/DL (ref 65–99)
HBA1C MFR BLD: 7.3 % (ref 4.8–5.6)
HDLC SERPL-MCNC: 38 MG/DL (ref 40–60)
IMP & REVIEW OF LAB RESULTS: NORMAL
LDLC SERPL CALC-MCNC: 66 MG/DL (ref 0–100)
MICROALBUMIN UR-MCNC: 5.1 UG/ML
POTASSIUM SERPL-SCNC: 5.1 MMOL/L (ref 3.5–5.2)
SODIUM SERPL-SCNC: 134 MMOL/L (ref 136–145)
TRIGL SERPL-MCNC: 44 MG/DL (ref 0–150)
VLDLC SERPL CALC-MCNC: 11 MG/DL (ref 5–40)

## 2024-06-05 ENCOUNTER — OFFICE VISIT (OUTPATIENT)
Dept: FAMILY MEDICINE CLINIC | Facility: CLINIC | Age: 69
End: 2024-06-05
Payer: MEDICARE

## 2024-06-05 VITALS
HEART RATE: 68 BPM | BODY MASS INDEX: 20.68 KG/M2 | DIASTOLIC BLOOD PRESSURE: 72 MMHG | OXYGEN SATURATION: 100 % | HEIGHT: 69 IN | RESPIRATION RATE: 16 BRPM | WEIGHT: 139.6 LBS | SYSTOLIC BLOOD PRESSURE: 124 MMHG

## 2024-06-05 DIAGNOSIS — I25.10 CORONARY ARTERY DISEASE INVOLVING NATIVE CORONARY ARTERY OF NATIVE HEART WITHOUT ANGINA PECTORIS: ICD-10-CM

## 2024-06-05 DIAGNOSIS — E78.5 HYPERLIPIDEMIA LDL GOAL <70: ICD-10-CM

## 2024-06-05 DIAGNOSIS — Z78.0 POSTMENOPAUSAL: ICD-10-CM

## 2024-06-05 DIAGNOSIS — I25.5 ISCHEMIC CARDIOMYOPATHY: ICD-10-CM

## 2024-06-05 DIAGNOSIS — J90 PLEURAL EFFUSION ON LEFT: ICD-10-CM

## 2024-06-05 DIAGNOSIS — Z12.31 SCREENING MAMMOGRAM, ENCOUNTER FOR: ICD-10-CM

## 2024-06-05 DIAGNOSIS — E10.8 TYPE 1 DIABETES MELLITUS WITH COMPLICATIONS: ICD-10-CM

## 2024-06-05 DIAGNOSIS — M81.0 SENILE OSTEOPOROSIS: Primary | ICD-10-CM

## 2024-06-05 DIAGNOSIS — M48.02 CERVICAL SPINAL STENOSIS: ICD-10-CM

## 2024-06-05 PROCEDURE — 3078F DIAST BP <80 MM HG: CPT | Performed by: FAMILY MEDICINE

## 2024-06-05 PROCEDURE — 1159F MED LIST DOCD IN RCRD: CPT | Performed by: FAMILY MEDICINE

## 2024-06-05 PROCEDURE — 3074F SYST BP LT 130 MM HG: CPT | Performed by: FAMILY MEDICINE

## 2024-06-05 PROCEDURE — 99214 OFFICE O/P EST MOD 30 MIN: CPT | Performed by: FAMILY MEDICINE

## 2024-06-05 PROCEDURE — 1160F RVW MEDS BY RX/DR IN RCRD: CPT | Performed by: FAMILY MEDICINE

## 2024-06-05 PROCEDURE — 1126F AMNT PAIN NOTED NONE PRSNT: CPT | Performed by: FAMILY MEDICINE

## 2024-06-05 PROCEDURE — 3051F HG A1C>EQUAL 7.0%<8.0%: CPT | Performed by: FAMILY MEDICINE

## 2024-06-05 PROCEDURE — G2211 COMPLEX E/M VISIT ADD ON: HCPCS | Performed by: FAMILY MEDICINE

## 2024-06-05 RX ORDER — SODIUM CHLORIDE 9 MG/ML
250 INJECTION, SOLUTION INTRAVENOUS ONCE
OUTPATIENT
Start: 2024-06-05

## 2024-06-05 NOTE — PATIENT INSTRUCTIONS
I will share my note with Dr. Devi to let him know that you're having low glucose readings into the 50's requiring drinking juice a couple times a week.     Follow up with Dr. Briscoe as planned for your lungs.     Continue physical therapy for your neck and legs.     I placed orders for your mammogram and DEXA and restarted your Reclast since it's been almost 2 years.

## 2024-06-05 NOTE — PROGRESS NOTES
"Subjective     Opal Gutierrez is a 69 y.o. female who presents with   Chief Complaint   Patient presents with    chest xray follow up       History of Present Illness     She had fluid in her chest and had thoracentesis with fluid drawn off suggested an exudate.  She's continued to do well and saw Dr. Briscoe.  To do a follow up CT with him.  No specific cause was identified.    Cervical Spinal Stenosis and in physical therapy which is really helping.  The epidural wasn't that helpful.  Saw Dr. Huang and Dr. Charles Delong in the workup.    She's currently drinking a bottle of juice in the office due to low blood sugar into the 50's.  She's had some pump adjustments and her A1C is much better at 7.4. However, she's having to drink juice a couple times a week. Seeing Dr. Devi's office.      Osteoporosis on IV Reclast but it was last done in 2022.      Her heart has been doing well and she's been feeling good since her thoracentesis.              Review of Systems     Objective     /72   Pulse 68   Resp 16   Ht 175.3 cm (69.02\")   Wt 63.3 kg (139 lb 9.6 oz)   SpO2 100%   Breastfeeding No   BMI 20.60 kg/m²     Physical Exam  Constitutional:       Appearance: Normal appearance.   Cardiovascular:      Rate and Rhythm: Normal rate and regular rhythm.      Heart sounds: Normal heart sounds.   Pulmonary:      Effort: Pulmonary effort is normal.      Breath sounds: Normal breath sounds.   Neurological:      Mental Status: She is alert.   Psychiatric:         Behavior: Behavior normal.         Procedures     Assessment & Plan   Diagnoses and all orders for this visit:    1. Senile osteoporosis (Primary)  -     DEXA Bone Density Axial; Future  -     Ambulatory Referral to [facility] For Infusion Treatment    2. Type 1 diabetes mellitus with complications  Assessment & Plan:  With improved A1C and episodic low values.      3. Postmenopausal  -     DEXA Bone Density Axial; Future    4. Cervical spinal stenosis  Assessment & " Plan:  In therapy with help      5. Hyperlipidemia LDL goal <70    6. Screening mammogram, encounter for  -     Mammo Screening Digital Tomosynthesis Bilateral With CAD; Future    7. Pleural effusion on left    8. Coronary artery disease involving native coronary artery of native heart without angina pectoris    9. Ischemic cardiomyopathy  Assessment & Plan:  On spironolactone and furosemide.  This could be the underlying issue with the pleural effusion.      Other orders  -     sodium chloride 0.9 % infusion 250 mL       BMI is within normal parameters. No other follow-up for BMI required.     Discussion    Patient Instructions   I will share my note with Dr. Devi to let him know that you're having low glucose readings into the 50's requiring drinking juice a couple times a week.     Follow up with Dr. Briscoe as planned for your lungs.     Continue physical therapy for your neck and legs.     I placed orders for your mammogram and DEXA and restarted your Reclast since it's been almost 2 years.               Aleja Lozano MD

## 2024-06-06 ENCOUNTER — OFFICE VISIT (OUTPATIENT)
Dept: NEUROLOGY | Facility: CLINIC | Age: 69
End: 2024-06-06
Payer: MEDICARE

## 2024-06-06 VITALS
SYSTOLIC BLOOD PRESSURE: 112 MMHG | WEIGHT: 138 LBS | OXYGEN SATURATION: 96 % | DIASTOLIC BLOOD PRESSURE: 64 MMHG | BODY MASS INDEX: 20.37 KG/M2

## 2024-06-06 DIAGNOSIS — M48.02 CERVICAL SPINAL STENOSIS: ICD-10-CM

## 2024-06-06 DIAGNOSIS — D47.2 NEUROPATHY ASSOCIATED WITH MGUS: ICD-10-CM

## 2024-06-06 DIAGNOSIS — E11.42 DIABETIC PERIPHERAL NEUROPATHY: Primary | ICD-10-CM

## 2024-06-06 DIAGNOSIS — G63 NEUROPATHY ASSOCIATED WITH MGUS: ICD-10-CM

## 2024-06-06 DIAGNOSIS — R26.9 GAIT DISTURBANCE: ICD-10-CM

## 2024-06-06 RX ORDER — GABAPENTIN 300 MG/1
300 CAPSULE ORAL 3 TIMES DAILY
Qty: 90 CAPSULE | Refills: 5 | Status: SHIPPED | OUTPATIENT
Start: 2024-06-06

## 2024-06-06 NOTE — PROGRESS NOTES
CC: Diabetic peripheral neuropathy    HPI:  Opal Gutierrez is a  69 y.o.  right-handed white female who I am seeing in follow-up regarding diabetic peripheral neuropathy.  She is accompanied by her .  She has a past medical history of anemia, CAD, diabetes type 1 (Insulin pump), hyperlipidemia, hypertension, IRVIN, and MI.  I last saw her on 3/6/2024, with the following history taken from that note with additions/modifications as indicated:    Patient has a 40-year history of diabetes and at least 20-year history of peripheral neuropathy.  She states that what started her current situation is that 6 to 7 months ago her right knee gave out causing her to fall.  The right quad is weak and her knee hurts. There is throbbing in the thigh.  She describes numbness and tingling in the feet and lower legs. The numbness in the feet is fairly significant.  The pain is fairly severe also and she states that the leg is not really any better than it was 6 months ago.  She states that the hands do not seem particularly numb but they do get cold just like her feet.  She was started on gabapentin 300 mg 3 times a day.  She describes some left-sided neck pain.  She tried physical therapy but it did not go very well since her balance was so poor and she could not get much out of the exercise.  Her labs for treatable causes of neuropathy showed an elevated IgG of 1988 associated with MGUS and kappa light chain. Copper 80, cryoglobulin not detected and heavy metals normal. Motor and sensory neuropathy panel was negative for any antibodies attacking the nerves.  Dr. Delong performed an EMG on 11/1/2023 with the following impression:     Complex abnormal study showing evidence of rather severe peripheral neuropathy with superimposed needle exam changes consistent with a femoral neuropathy. I cannot entirely exclude an L3 or 4 radiculopathy instead however no paraspinal abnormalities were seen to demonstrate root level  involvement.    History interim    Today she reports that her symptoms remain the same.  She continues to take gabapentin 300 mg 2-3 times a day.  She does report some drowsiness therefore, she does not always take the middle dose.  She continues to report right thigh pain but states that it is a little better after she had a steroid injection in her right knee.  She reports numbness in her feet but denies any painful symptoms.  She denies any numbness, tingling or painful symptoms in her hands.  She reports that occasionally they turn purple and are cold.  She denies any recent falls but is unsteady on her feet.  She ambulates with a cane or a walker.  She saw Dr. Huang with neurosurgery who recommended cervical epidural injections physical therapy.  She states that the cervical injections did not help but physical therapy has helped and she has better range of motion.  She also followed up with Dr. Khoury heme-onc for MGUS who will continue to check labs every 6 months.      Past Medical History:   Diagnosis Date    Acute metabolic encephalopathy 05/07/2021    Anemia     Anxiety     Arthritis     Benign hypertension 11/07/2022    Cirrhosis     Coronary artery disease     Depression     Diabetes mellitus type I     Diabetic ketoacidosis with coma associated with type 2 diabetes mellitus 01/03/2021    Esophageal varices     Genital HSV     2 times per year    GERD (gastroesophageal reflux disease)     Heart attack 10/2020    Heart murmur     Hip fracture     right- 2012, left 2013    Hyperlipidemia     Insulin pump titration 03/27/2016    Kidney stone     Myocardial infarction     IRVIN (nonalcoholic steatohepatitis)     Neuropathy     Osteopenia     Osteoporosis     managed by gynecologist.  On Reclast yearly    Osteoporosis, postmenopausal 07/22/2016    Pancreatitis          Past Surgical History:   Procedure Laterality Date    CAROTID STENT  10/2020    CATARACT EXTRACTION      COLONOSCOPY  2019    normal.     CORONARY STENT PLACEMENT      ENDOSCOPY      HAND SURGERY      for fracture    HIP SURGERY Left     JOINT REPLACEMENT  hip right    TOTAL HIP ARTHROPLASTY Right            Current Outpatient Medications:     Accu-Chek FastClix Lancets misc, Dx code E10.65 Testing bs 4 times daily, Disp: 408 each, Rfl: 1    Accu-Chek Guide test strip, Dx code E10.65 testing bs 4 x day, Disp: 400 each, Rfl: 1    Acetone, Urine, Test (Ketone Test) strip, 1 strip by Other route Daily., Disp: 100 strip, Rfl: 3    aspirin 81 MG EC tablet, Take 1 tablet by mouth Daily., Disp: , Rfl:     atorvastatin (LIPITOR) 20 MG tablet, Take 1 tablet by mouth Daily., Disp: , Rfl:     Cholecalciferol (Vitamin D3) 25 MCG (1000 UT) capsule, TAKE 1 CAPSULE BY MOUTH DAILY IN ADDITION TO VITAMIN D3 5000 UNITS DAILY, Disp: 90 capsule, Rfl: 1    CREON 69804-88000 units capsule delayed-release particles capsule, TK 2 CS PO TID, Disp: , Rfl: 3    FLUoxetine (PROzac) 20 MG capsule, TAKE 1 CAPSULE BY MOUTH EVERY DAY, Disp: 30 capsule, Rfl: 6    furosemide (LASIX) 20 MG tablet, TAKE 1 TABLET BY MOUTH EVERY DAY AS NEEDED FOR SWELLING, Disp: 15 tablet, Rfl: 0    gabapentin (NEURONTIN) 300 MG capsule, 1 tab nightly for 1 week then 1 tab twice daily for 1 week then 1 tab 3 times per day (Patient taking differently: Take 1 capsule by mouth 3 (Three) Times a Day. Pt currently taking 2 tablets per day), Disp: 90 capsule, Rfl: 5    glucagon (GLUCAGEN) 1 MG injection, Inject 1 mg into the appropriate muscle as directed by prescriber See Admin Instructions. For emergency use for severe low glucose: first inject, then call 911, once awake try to give oral treatment., Disp: 1 kit, Rfl: 3    Insulin Infusion Pump device, Inject 1 each under the skin into the appropriate area as directed., Disp: , Rfl:     Insulin Lispro (HumaLOG) 100 UNIT/ML injection, 70u/day via insulin pump e10.65, Disp: 63 mL, Rfl: 2    pantoprazole (PROTONIX) 40 MG EC tablet, Take 1 tablet by mouth Daily.,  Disp: , Rfl:     spironolactone (ALDACTONE) 25 MG tablet, TK 1/2 T PO D, Disp: , Rfl:     traZODone (DESYREL) 150 MG tablet, TAKE 1 TABLET BY MOUTH EVERY NIGHT AT BEDTIME, Disp: 90 tablet, Rfl: 3    Current Facility-Administered Medications:     zoledronic acid (RECLAST) infusion 5 mg, 5 mg, Intravenous, Once, Grayson Levi MD      Family History   Problem Relation Age of Onset    Cancer Father         prostate    Diabetes Father     Heart disease Father     Alzheimer's disease Mother         age 90    Colon cancer Maternal Grandmother 80    Heart disease Brother          Social History     Socioeconomic History    Marital status:    Tobacco Use    Smoking status: Never    Smokeless tobacco: Never   Vaping Use    Vaping status: Never Used   Substance and Sexual Activity    Alcohol use: Not Currently    Drug use: No    Sexual activity: Not Currently     Partners: Male     Birth control/protection: Other         Allergies   Allergen Reactions    Contrast Dye (Echo Or Unknown Ct/Mr) Other (See Comments)     Terrible back pains    Iodinated Contrast Media Unknown (See Comments)     SEVERE BACK PAIN  SEVERE BACK PAIN  SEVERE BACK PAIN    Penicillins Hives         Pain Scale: 3/10 right thigh        ROS:  Review of Systems   Musculoskeletal:  Positive for gait problem.   Neurological:  Positive for numbness. Negative for dizziness, tremors, seizures, syncope, facial asymmetry, speech difficulty, weakness, light-headedness and headaches.   Psychiatric/Behavioral: Negative.         I have reviewed and agree with the above ROS completed by medical assistant.    Physical Exam:  Vitals:    06/06/24 1042   Weight: 62.6 kg (138 lb)     Orthostatic BP:    Body mass index is 20.37 kg/m².    Physical Exam  General: Slender white female no acute distress  HEENT: Normocephalic no evidence of trauma  Neck: Supple.  No thyromegaly.  Transient cardiac murmur heard in the neck vessels  Heart: Regular rate and rhythm  murmur present  Extremities: Radial pulses strong and simultaneous.  No pedal edema.  Hammertoes on the right      Neurological Exam:   Mental Status: Awake, alert, oriented to person, place and time.  Conversant without evidence of an affective disorder, thought disorder, delusions or hallucinations.  Attention span and concentration are normal.  HCF: No aphasia, apraxia or dysarthria.  Recent and remote memory intact.  Knowledge of recent events intact.  CN: I:   II: Visual fields full without left inattention   III, IV, VI: Eye movements intact without nystagmus or ptosis.  Pupils equal round and reactive to light.   V,VII: Light touch and pinprick intact all 3 divisions of V.  Facial muscles symmetrical.   VIII: Hearing intact to finger rub   IX,X: Soft palate elevates symmetrically   XI: Sternomastoid and trapezius are strong.   XII: Tongue midline without atrophy or fasciculations  Motor: Normal tone in the upper and lower extremities.  Reduced bulk intrinsic hand muscles   Power testing: Significant weakness of interosseous and abductor digiti quinti, mild to moderate weakness APB muscles, mild triceps and pronator teres weakness bilaterally.  Moderate weakness of iliopsoas muscles bilaterally worse on the right, mild right tibialis anterior weakness and minimal left tibialis anterior weakness.  Bilateral toe extensor weakness.  Reflexes: Upper extremities: Areflexia        Lower extremities: Areflexia        Toe signs:  Sensory: Light touch: Reduced on the fingertips and ankles, feet/toes        Pinprick: Reduced in a gradient fashion from the knees down significantly in the feet        Vibration: Absent at the ankles and knees intact in the hands        Position: Abnormal in the great toes bilaterally    Cerebellar: Finger-to-nose: Normal           Rapid movement: Normal           Heel-to-shin:  Gait and Station: Comes to stand pushing off the chair.  Broad-based.  Unsteady.  Appears antalgic.  Ambulated  "with a cane.    Results:      Lab Results   Component Value Date    GLUCOSE 121 (H) 05/24/2024    BUN 7 (L) 05/24/2024    CREATININE 0.79 05/24/2024    EGFRIFNONA 82 02/24/2022    EGFRIFAFRI >60 08/31/2022    BCR 8.9 05/24/2024    CO2 29.3 (H) 05/24/2024    CALCIUM 9.3 05/24/2024    PROTENTOTREF 7.6 03/18/2024    ALBUMIN 3.8 03/18/2024    LABIL2 1.0 03/18/2024    AST 34 (H) 03/18/2024    ALT 26 03/18/2024       Lab Results   Component Value Date    WBC 7.77 03/08/2024    HGB 12.1 03/08/2024    HCT 37.8 03/08/2024    MCV 84.2 03/08/2024     03/08/2024       .No results found for: \"RPR\"    Lab Results   Component Value Date    TSH 2.420 05/09/2023    THYROIDAB <9 01/27/2021       Lab Results   Component Value Date    OAQVQTTV93 428 10/31/2023       Lab Results   Component Value Date    FOLATE 11.0 10/31/2023       Lab Results   Component Value Date    HGBA1C 7.30 (H) 05/24/2024     Lab Results   Component Value Date    ARSENIC 1 01/26/2024       Lab Results   Component Value Date    MERCURY <1.0 01/26/2024       Lab Results   Component Value Date    COPPER 80 01/26/2024           Assessment:   1.  Severe peripheral neuropathy associated with MGUS and diabetes-stable.  Fairly well-controlled on gabapentin reports some drowsiness.  Patient states her hemoglobin A1c has improved significantly in the last 6 months.  Hemoglobin A1c was 10.8% and is now 7.3%.  2.  Superimposed right femoral neuropathy  3.  Cervical spinal stenosis-cervical injections were not effective but patient has seen improvement in range of motion with physical therapy.  4.  Gait disturbance          Plan:  1.  Continue gabapentin 300 mg 2-3 times a day.  Javier reviewed.  2.  Continue physical therapy exercises at home  3.  Encouraged use of assistive device at all times to prevent falls  4.  Ideal targets for risk factor control would be Blood pressure < 130/80, B12 > 500, TSH in normal range and LDL < 70; HbA1c < 6.5 and smoking cessation if " applicable. Call 911 for stroke symptoms.  Recommend 150 minutes of physical activity weekly.   5.  Follow-up in 4 to 6 months or sooner if needed      Time: Spent 35 minutes in total encounter time. This included time for chart review, obtaining history, performing pertinent physical examination, updating medical information, ordering tests/referrals, discussion of diagnosis, medical management, counseling, and encounter documentation.                  Dictated utilizing Dragon dictation.

## 2024-06-12 ENCOUNTER — HOSPITAL ENCOUNTER (OUTPATIENT)
Dept: CT IMAGING | Facility: HOSPITAL | Age: 69
Discharge: HOME | End: 2024-06-12
Admitting: INTERNAL MEDICINE
Payer: MEDICARE

## 2024-06-12 DIAGNOSIS — M81.0 SENILE OSTEOPOROSIS: Primary | ICD-10-CM

## 2024-06-12 DIAGNOSIS — R91.1 LUNG NODULE: ICD-10-CM

## 2024-06-12 PROCEDURE — 71250 CT THORAX DX C-: CPT

## 2024-06-12 RX ORDER — ZOLEDRONIC ACID 5 MG/100ML
5 INJECTION, SOLUTION INTRAVENOUS ONCE
OUTPATIENT
Start: 2024-06-12

## 2024-06-12 RX ORDER — SODIUM CHLORIDE 9 MG/ML
20 INJECTION, SOLUTION INTRAVENOUS ONCE
OUTPATIENT
Start: 2024-06-12

## 2024-06-17 DIAGNOSIS — I50.9 CONGESTIVE HEART FAILURE, UNSPECIFIED HF CHRONICITY, UNSPECIFIED HEART FAILURE TYPE: ICD-10-CM

## 2024-06-17 RX ORDER — FUROSEMIDE 20 MG/1
TABLET ORAL
Qty: 15 TABLET | Refills: 0 | Status: SHIPPED | OUTPATIENT
Start: 2024-06-17

## 2024-06-17 NOTE — TELEPHONE ENCOUNTER
Rx Refill Note  Requested Prescriptions     Pending Prescriptions Disp Refills    furosemide (LASIX) 20 MG tablet [Pharmacy Med Name: FUROSEMIDE 20MG TABLETS] 15 tablet 0     Sig: TAKE 1 TABLET BY MOUTH EVERY DAY AS NEEDED FOR SWELLING      Last office visit with prescribing clinician: 6/5/2024   Next office visit with prescribing clinician: 12/6/2024     Beatriz Nieto MA  06/17/24, 11:54 EDT

## 2024-06-21 ENCOUNTER — HOSPITAL ENCOUNTER (OUTPATIENT)
Dept: INFUSION THERAPY | Facility: HOSPITAL | Age: 69
Discharge: HOME | End: 2024-06-21
Payer: MEDICARE

## 2024-06-21 VITALS
HEART RATE: 67 BPM | OXYGEN SATURATION: 100 % | WEIGHT: 138 LBS | DIASTOLIC BLOOD PRESSURE: 81 MMHG | SYSTOLIC BLOOD PRESSURE: 145 MMHG | TEMPERATURE: 96.9 F | BODY MASS INDEX: 20.37 KG/M2 | RESPIRATION RATE: 20 BRPM

## 2024-06-21 DIAGNOSIS — M81.0 SENILE OSTEOPOROSIS: Primary | ICD-10-CM

## 2024-06-21 LAB
MAGNESIUM SERPL-MCNC: 1.3 MG/DL (ref 1.6–2.4)
PHOSPHATE SERPL-MCNC: 2.7 MG/DL (ref 2.5–4.5)

## 2024-06-21 PROCEDURE — 84100 ASSAY OF PHOSPHORUS: CPT | Performed by: FAMILY MEDICINE

## 2024-06-21 PROCEDURE — 36415 COLL VENOUS BLD VENIPUNCTURE: CPT

## 2024-06-21 PROCEDURE — 25010000002 ZOLEDRONIC ACID 5 MG/100ML SOLUTION: Performed by: FAMILY MEDICINE

## 2024-06-21 PROCEDURE — 96374 THER/PROPH/DIAG INJ IV PUSH: CPT

## 2024-06-21 PROCEDURE — 83735 ASSAY OF MAGNESIUM: CPT | Performed by: FAMILY MEDICINE

## 2024-06-21 RX ORDER — ZOLEDRONIC ACID 5 MG/100ML
5 INJECTION, SOLUTION INTRAVENOUS ONCE
Status: CANCELLED | OUTPATIENT
Start: 2024-06-21

## 2024-06-21 RX ORDER — SODIUM CHLORIDE 9 MG/ML
20 INJECTION, SOLUTION INTRAVENOUS ONCE
Status: DISCONTINUED | OUTPATIENT
Start: 2024-06-21 | End: 2024-06-23 | Stop reason: HOSPADM

## 2024-06-21 RX ORDER — ZOLEDRONIC ACID 5 MG/100ML
5 INJECTION, SOLUTION INTRAVENOUS ONCE
Status: COMPLETED | OUTPATIENT
Start: 2024-06-21 | End: 2024-06-21

## 2024-06-21 RX ORDER — SODIUM CHLORIDE 9 MG/ML
20 INJECTION, SOLUTION INTRAVENOUS ONCE
Status: CANCELLED | OUTPATIENT
Start: 2024-06-21

## 2024-06-21 RX ADMIN — ZOLEDRONIC ACID 5 MG: 5 INJECTION INTRAVENOUS at 13:15

## 2024-06-21 NOTE — PROGRESS NOTES
Lab order parameters met.   Reclast given per order.   Patient tolerated infusion without complaints.   Patient ambulatory, D/C'd from [facility].

## 2024-06-24 ENCOUNTER — TRANSCRIBE ORDERS (OUTPATIENT)
Dept: ADMINISTRATIVE | Facility: HOSPITAL | Age: 69
End: 2024-06-24
Payer: MEDICARE

## 2024-06-24 DIAGNOSIS — R91.1 LUNG NODULE: Primary | ICD-10-CM

## 2024-07-01 ENCOUNTER — HOSPITAL ENCOUNTER (OUTPATIENT)
Dept: MAMMOGRAPHY | Facility: HOSPITAL | Age: 69
Discharge: HOME | End: 2024-07-01
Admitting: FAMILY MEDICINE
Payer: MEDICARE

## 2024-07-01 DIAGNOSIS — Z12.31 SCREENING MAMMOGRAM, ENCOUNTER FOR: ICD-10-CM

## 2024-07-01 PROCEDURE — 77067 SCR MAMMO BI INCL CAD: CPT

## 2024-07-01 PROCEDURE — 77063 BREAST TOMOSYNTHESIS BI: CPT

## 2024-07-10 DIAGNOSIS — N64.89 OTHER SPECIFIED DISORDERS OF BREAST: ICD-10-CM

## 2024-07-10 DIAGNOSIS — R92.30 DENSE BREAST TISSUE ON MAMMOGRAM, UNSPECIFIED TYPE: Primary | ICD-10-CM

## 2024-08-22 ENCOUNTER — HOSPITAL ENCOUNTER (OUTPATIENT)
Dept: ULTRASOUND IMAGING | Facility: HOSPITAL | Age: 69
Discharge: HOME | End: 2024-08-22
Admitting: FAMILY MEDICINE
Payer: MEDICARE

## 2024-08-22 DIAGNOSIS — R92.30 DENSE BREAST TISSUE ON MAMMOGRAM, UNSPECIFIED TYPE: ICD-10-CM

## 2024-08-22 DIAGNOSIS — N64.89 OTHER SPECIFIED DISORDERS OF BREAST: ICD-10-CM

## 2024-08-22 DIAGNOSIS — N63.12 MASS OF UPPER INNER QUADRANT OF RIGHT BREAST: Primary | ICD-10-CM

## 2024-08-22 PROCEDURE — 76641 ULTRASOUND BREAST COMPLETE: CPT

## 2024-08-30 ENCOUNTER — LAB (OUTPATIENT)
Dept: OTHER | Facility: HOSPITAL | Age: 69
End: 2024-08-30
Payer: MEDICARE

## 2024-08-30 DIAGNOSIS — D47.2 MONOCLONAL GAMMOPATHY: ICD-10-CM

## 2024-08-30 LAB
ALBUMIN SERPL-MCNC: 3.3 G/DL (ref 3.5–5.2)
ALBUMIN/GLOB SERPL: 0.7 G/DL
ALP SERPL-CCNC: 125 U/L (ref 39–117)
ALT SERPL W P-5'-P-CCNC: 28 U/L (ref 1–33)
ANION GAP SERPL CALCULATED.3IONS-SCNC: 6.2 MMOL/L (ref 5–15)
AST SERPL-CCNC: 32 U/L (ref 1–32)
BASOPHILS # BLD AUTO: 0.04 10*3/MM3 (ref 0–0.2)
BASOPHILS NFR BLD AUTO: 0.6 % (ref 0–1.5)
BILIRUB SERPL-MCNC: 0.3 MG/DL (ref 0–1.2)
BUN SERPL-MCNC: 13 MG/DL (ref 8–23)
BUN/CREAT SERPL: 16.9 (ref 7–25)
CALCIUM SPEC-SCNC: 8.4 MG/DL (ref 8.6–10.5)
CHLORIDE SERPL-SCNC: 96 MMOL/L (ref 98–107)
CO2 SERPL-SCNC: 29.8 MMOL/L (ref 22–29)
CREAT SERPL-MCNC: 0.77 MG/DL (ref 0.57–1)
DEPRECATED RDW RBC AUTO: 42.5 FL (ref 37–54)
EGFRCR SERPLBLD CKD-EPI 2021: 84.7 ML/MIN/1.73
EOSINOPHIL # BLD AUTO: 0.19 10*3/MM3 (ref 0–0.4)
EOSINOPHIL NFR BLD AUTO: 3 % (ref 0.3–6.2)
ERYTHROCYTE [DISTWIDTH] IN BLOOD BY AUTOMATED COUNT: 13.4 % (ref 12.3–15.4)
GLOBULIN UR ELPH-MCNC: 4.7 GM/DL
GLUCOSE SERPL-MCNC: 263 MG/DL (ref 65–99)
HCT VFR BLD AUTO: 37.9 % (ref 34–46.6)
HGB BLD-MCNC: 11.6 G/DL (ref 12–15.9)
IMM GRANULOCYTES # BLD AUTO: 0.01 10*3/MM3 (ref 0–0.05)
IMM GRANULOCYTES NFR BLD AUTO: 0.2 % (ref 0–0.5)
LYMPHOCYTES # BLD AUTO: 1.56 10*3/MM3 (ref 0.7–3.1)
LYMPHOCYTES NFR BLD AUTO: 25 % (ref 19.6–45.3)
MCH RBC QN AUTO: 26.2 PG (ref 26.6–33)
MCHC RBC AUTO-ENTMCNC: 30.6 G/DL (ref 31.5–35.7)
MCV RBC AUTO: 85.6 FL (ref 79–97)
MONOCYTES # BLD AUTO: 0.47 10*3/MM3 (ref 0.1–0.9)
MONOCYTES NFR BLD AUTO: 7.5 % (ref 5–12)
NEUTROPHILS NFR BLD AUTO: 3.97 10*3/MM3 (ref 1.7–7)
NEUTROPHILS NFR BLD AUTO: 63.7 % (ref 42.7–76)
NRBC BLD AUTO-RTO: 0 /100 WBC (ref 0–0.2)
PLATELET # BLD AUTO: 364 10*3/MM3 (ref 140–450)
PMV BLD AUTO: 9.6 FL (ref 6–12)
POTASSIUM SERPL-SCNC: 5.1 MMOL/L (ref 3.5–5.2)
PROT SERPL-MCNC: 8 G/DL (ref 6–8.5)
RBC # BLD AUTO: 4.43 10*6/MM3 (ref 3.77–5.28)
SODIUM SERPL-SCNC: 132 MMOL/L (ref 136–145)
WBC NRBC COR # BLD AUTO: 6.24 10*3/MM3 (ref 3.4–10.8)

## 2024-08-30 PROCEDURE — 86334 IMMUNOFIX E-PHORESIS SERUM: CPT | Performed by: INTERNAL MEDICINE

## 2024-08-30 PROCEDURE — 82784 ASSAY IGA/IGD/IGG/IGM EACH: CPT | Performed by: INTERNAL MEDICINE

## 2024-08-30 PROCEDURE — 80053 COMPREHEN METABOLIC PANEL: CPT | Performed by: INTERNAL MEDICINE

## 2024-08-30 PROCEDURE — 83521 IG LIGHT CHAINS FREE EACH: CPT | Performed by: INTERNAL MEDICINE

## 2024-08-30 PROCEDURE — 84165 PROTEIN E-PHORESIS SERUM: CPT | Performed by: INTERNAL MEDICINE

## 2024-08-30 PROCEDURE — 85025 COMPLETE CBC W/AUTO DIFF WBC: CPT | Performed by: INTERNAL MEDICINE

## 2024-08-30 PROCEDURE — 36415 COLL VENOUS BLD VENIPUNCTURE: CPT

## 2024-09-03 LAB
ALBUMIN SERPL ELPH-MCNC: 3.2 G/DL (ref 2.9–4.4)
ALBUMIN/GLOB SERPL: 0.8 {RATIO} (ref 0.7–1.7)
ALPHA1 GLOB SERPL ELPH-MCNC: 0.3 G/DL (ref 0–0.4)
ALPHA2 GLOB SERPL ELPH-MCNC: 1.1 G/DL (ref 0.4–1)
B-GLOBULIN SERPL ELPH-MCNC: 0.8 G/DL (ref 0.7–1.3)
GAMMA GLOB SERPL ELPH-MCNC: 2 G/DL (ref 0.4–1.8)
GLOBULIN SER-MCNC: 4.2 G/DL (ref 2.2–3.9)
IGA SERPL-MCNC: 242 MG/DL (ref 87–352)
IGG SERPL-MCNC: 1940 MG/DL (ref 586–1602)
IGM SERPL-MCNC: 141 MG/DL (ref 26–217)
INTERPRETATION SERPL IEP-IMP: ABNORMAL
KAPPA LC FREE SER-MCNC: 109.5 MG/L (ref 3.3–19.4)
KAPPA LC FREE/LAMBDA FREE SER: 4.29 {RATIO} (ref 0.26–1.65)
LABORATORY COMMENT REPORT: ABNORMAL
LAMBDA LC FREE SERPL-MCNC: 25.5 MG/L (ref 5.7–26.3)
M PROTEIN SERPL ELPH-MCNC: 0.7 G/DL
PROT SERPL-MCNC: 7.4 G/DL (ref 6–8.5)

## 2024-09-06 ENCOUNTER — OFFICE VISIT (OUTPATIENT)
Dept: ONCOLOGY | Facility: CLINIC | Age: 69
End: 2024-09-06
Payer: MEDICARE

## 2024-09-06 VITALS
BODY MASS INDEX: 21.45 KG/M2 | TEMPERATURE: 98.5 F | OXYGEN SATURATION: 98 % | HEART RATE: 64 BPM | DIASTOLIC BLOOD PRESSURE: 79 MMHG | WEIGHT: 144.8 LBS | RESPIRATION RATE: 16 BRPM | SYSTOLIC BLOOD PRESSURE: 159 MMHG | HEIGHT: 69 IN

## 2024-09-06 DIAGNOSIS — D47.2 MONOCLONAL GAMMOPATHY: Primary | ICD-10-CM

## 2024-09-06 NOTE — PROGRESS NOTES
REASON FOR FOLLOW UP:    IgG kappa monoclonal gammopathy    HISTORY OF PRESENT ILLNESS:  Opal Gutierrez is a 69 y.o. female who is seen today for further evaluation of IgG kappa monoclonal gammopathy.    She was seen in consultation 6 months ago with Dr. Khoury noted to have an MGUS.  She therefore returns today for review of repeat labs.  She does continue to struggle with neuropathy followed by neurology which is unchanged from her baseline.  She does report some intermittent epigastric discomfort.  She also has shortness of breath on exertion.  She otherwise has no new pain.    Past Medical History:   Diagnosis Date    Acute metabolic encephalopathy 05/07/2021    Anemia     Anxiety     Arthritis     Benign hypertension 11/07/2022    Cirrhosis     Coronary artery disease     Depression     Diabetes mellitus type I     Diabetic ketoacidosis with coma associated with type 2 diabetes mellitus 01/03/2021    Esophageal varices     Genital HSV     2 times per year    GERD (gastroesophageal reflux disease)     Heart attack 10/2020    Heart murmur     Hip fracture     right- 2012, left 2013    Hyperlipidemia     Insulin pump titration 03/27/2016    Kidney stone     Myocardial infarction     IRVIN (nonalcoholic steatohepatitis)     Neuropathy     Osteopenia     Osteoporosis     managed by gynecologist.  On Reclast yearly    Osteoporosis, postmenopausal 07/22/2016    Pancreatitis        Past Surgical History:   Procedure Laterality Date    CAROTID STENT  10/2020    CATARACT EXTRACTION      COLONOSCOPY  2019    normal.    CORONARY STENT PLACEMENT      ENDOSCOPY      HAND SURGERY      for fracture    HIP SURGERY Left     JOINT REPLACEMENT  hip right    TOTAL HIP ARTHROPLASTY Right        SOCIAL HISTORY:   reports that she has never smoked. She has never used smokeless tobacco. She reports that she does not currently use alcohol. She reports that she does not use drugs.    FAMILY HISTORY:  family history includes Alzheimer's  "disease in her mother; Cancer in her father; Colon cancer (age of onset: 80) in her maternal grandmother; Diabetes in her father; Heart disease in her brother and father.    ALLERGIES:  Allergies   Allergen Reactions    Contrast Dye (Echo Or Unknown Ct/Mr) Other (See Comments)     Terrible back pains    Iodinated Contrast Media Unknown (See Comments)     SEVERE BACK PAIN  SEVERE BACK PAIN  SEVERE BACK PAIN    Penicillins Hives       MEDICATIONS:  The medication list has been reviewed with the patient by the medical assistant, and the list has been updated in the electronic medical record, which I reviewed.  Medication dosages and frequencies were confirmed to be accurate.    Review of Systems  ROS as per HPI    PHYSICAL EXAMINATION:  Vitals:    09/06/24 1546   BP: 159/79   Pulse: 64   Resp: 16   Temp: 98.5 °F (36.9 °C)   TempSrc: Oral   SpO2: 98%   Weight: 65.7 kg (144 lb 12.8 oz)   Height: 175 cm (68.9\")   PainSc: 0-No pain       Physical Exam  Vitals reviewed.   Constitutional:       Appearance: She is well-developed.      Comments: Ambulates with a cane   HENT:      Head: Normocephalic and atraumatic.      Nose: Nose normal.   Eyes:      Conjunctiva/sclera: Conjunctivae normal.      Pupils: Pupils are equal, round, and reactive to light.   Cardiovascular:      Rate and Rhythm: Normal rate and regular rhythm.      Heart sounds: S1 normal and S2 normal. Murmur heard.      Systolic murmur is present with a grade of 3/6.      No friction rub. No gallop.   Pulmonary:      Effort: Pulmonary effort is normal. No respiratory distress.      Breath sounds: Normal breath sounds.   Abdominal:      General: Bowel sounds are normal.      Palpations: Abdomen is soft.      Tenderness: There is no abdominal tenderness.   Musculoskeletal:         General: Normal range of motion.      Cervical back: Neck supple.   Skin:     General: Skin is warm and dry.      Findings: No erythema or rash.   Neurological:      Mental Status: She is " alert and oriented to person, place, and time.   Psychiatric:         Behavior: Behavior normal.         Thought Content: Thought content normal.         Judgment: Judgment normal.         DIAGNOSTIC DATA:  CBC & Differential (08/30/2024 10:50)  Comprehensive Metabolic Panel (08/30/2024 10:50)  EDISON,PE and FLC, Serum (08/30/2024 10:50)    IMAGING:    None reviewed    ASSESSMENT:  This is a 69 y.o. female with:    *Poorly controlled type 1 diabetes    *Peripheral neuropathy secondary to type 1 diabetes  Following regularly with neurology    *IgG kappa monoclonal gammopathy  Serum protein electrophoresis with immunofixation on 1/26/2024 with a 0.7 g IgG kappa monoclonal protein.  IgG 1988.  Almost certainly MGUS and extremely doubtful that this is contributing to neuropathy  Repeat labs 8/30/2024 without change with IgG kappa monoclonal protein noted, IgG 1940, M spike 0.7    *Left pleural effusion  Thoracentesis 3/14/2024 with 550 mL removed, negative for malignant cells  She does report minimal shortness of breath though she does not have significant effusion on exam today.  She was encouraged to contact primary care if symptoms persist    PLAN:   Labs reviewed with the patient today with no change in monoclonal protein  Continue to follow-up with neurology, Dr. Delong  Return in 6 months for CBC, CMP, EDISON, SPEP, FLC and MD follow-up.  Labs 1 week prior    ORDERS PLACED DURING THIS ENCOUNTER  Orders Placed This Encounter   Procedures    Comprehensive Metabolic Panel     **STAT**     Standing Status:   Future     Standing Expiration Date:   9/6/2025     Scheduling Instructions:      **STAT**     Order Specific Question:   Release to patient     Answer:   Routine Release [[id number]]    EDISON, PE & Free LT Chains, Ser     Standing Status:   Future     Standing Expiration Date:   9/6/2025     Order Specific Question:   Release to patient     Answer:   Routine Release [[id number]]    CBC & Differential     Standing Status:    Future     Standing Expiration Date:   9/6/2025     Order Specific Question:   Manual Differential     Answer:   No     Order Specific Question:   Release to patient     Answer:   Routine Release [[id number]]        Agnes Durbin, APRN  09/06/2024

## 2024-09-11 ENCOUNTER — OFFICE VISIT (OUTPATIENT)
Dept: ENDOCRINOLOGY | Age: 69
End: 2024-09-11
Payer: MEDICARE

## 2024-09-11 VITALS
DIASTOLIC BLOOD PRESSURE: 74 MMHG | SYSTOLIC BLOOD PRESSURE: 122 MMHG | BODY MASS INDEX: 21.95 KG/M2 | TEMPERATURE: 96.9 F | WEIGHT: 148.2 LBS | HEART RATE: 74 BPM | OXYGEN SATURATION: 94 % | HEIGHT: 69 IN

## 2024-09-11 DIAGNOSIS — E10.42 DIABETIC PERIPHERAL NEUROPATHY ASSOCIATED WITH TYPE 1 DIABETES MELLITUS: ICD-10-CM

## 2024-09-11 DIAGNOSIS — E78.2 MIXED HYPERLIPIDEMIA: ICD-10-CM

## 2024-09-11 DIAGNOSIS — E10.65 TYPE 1 DIABETES MELLITUS WITH HYPERGLYCEMIA: Primary | ICD-10-CM

## 2024-09-11 DIAGNOSIS — Z86.79 HISTORY OF CAD (CORONARY ARTERY DISEASE): ICD-10-CM

## 2024-09-11 PROCEDURE — 3051F HG A1C>EQUAL 7.0%<8.0%: CPT | Performed by: NURSE PRACTITIONER

## 2024-09-11 PROCEDURE — 3074F SYST BP LT 130 MM HG: CPT | Performed by: NURSE PRACTITIONER

## 2024-09-11 PROCEDURE — 95251 CONT GLUC MNTR ANALYSIS I&R: CPT | Performed by: NURSE PRACTITIONER

## 2024-09-11 PROCEDURE — 3078F DIAST BP <80 MM HG: CPT | Performed by: NURSE PRACTITIONER

## 2024-09-11 PROCEDURE — 99214 OFFICE O/P EST MOD 30 MIN: CPT | Performed by: NURSE PRACTITIONER

## 2024-09-11 RX ORDER — INSULIN LISPRO 100 [IU]/ML
INJECTION, SOLUTION INTRAVENOUS; SUBCUTANEOUS
Qty: 70 ML | Refills: 0 | Status: SHIPPED | OUTPATIENT
Start: 2024-09-11

## 2024-09-11 RX ORDER — BLOOD SUGAR DIAGNOSTIC
STRIP MISCELLANEOUS
Qty: 400 EACH | Refills: 1 | Status: SHIPPED | OUTPATIENT
Start: 2024-09-11

## 2024-09-11 NOTE — PROGRESS NOTES
"Chief Complaint  Diabetes (Type 1: Pt pump is attached. )    Subjective        Opal Gutierrez presents to [facility]  History of Present Illness    Type 1 diabetes, diagnosed in her 20s and started insulin shortly after, has been on insulin pump since 2014  DM regimen: Medtronic 780g with smart guard technology   Back up plan: insulin syringes   Glucagon: yes  Known DM complications:  neuropathy: on gabapentin, CAD; angioplasty with stent to the LAD October 2020; Ischemic Cardiomyopathy, on atorvastatin 20 mg    diabetic eye exam: 7/2024     Cgm review 8/29/24-9/11/24  High 28%  Very high 10%  62% time in range  0% low  Smart guard 91%  Gmi 7.4%  TDD 66u  Bolus 74%    Objective   Vital Signs:  /74   Pulse 74   Temp 96.9 °F (36.1 °C) (Temporal)   Ht 175 cm (68.9\")   Wt 67.2 kg (148 lb 3.2 oz)   SpO2 94%   BMI 21.95 kg/m²   Estimated body mass index is 21.95 kg/m² as calculated from the following:    Height as of this encounter: 175 cm (68.9\").    Weight as of this encounter: 67.2 kg (148 lb 3.2 oz).    BMI is within normal parameters. No other follow-up for BMI required.      Physical Exam  Vitals reviewed.   Constitutional:       General: She is not in acute distress.  HENT:      Head: Normocephalic and atraumatic.   Cardiovascular:      Rate and Rhythm: Normal rate and regular rhythm.   Pulmonary:      Effort: Pulmonary effort is normal. No respiratory distress.   Musculoskeletal:         General: No signs of injury. Normal range of motion.      Cervical back: Normal range of motion and neck supple.   Skin:     General: Skin is warm and dry.   Neurological:      Mental Status: She is alert and oriented to person, place, and time. Mental status is at baseline.      Motor: Weakness present.      Gait: Gait abnormal.   Psychiatric:         Mood and Affect: Mood normal.         Behavior: Behavior normal.         Thought Content: Thought content normal.         Judgment: " Judgment normal.        Result Review :  The following data was reviewed by: KATELIN Malik on 09/11/2024:  Common labs          3/18/2024    14:34 5/24/2024    12:02 8/30/2024    10:50   Common Labs   Glucose 217  121  263    BUN 12  7  13    Creatinine 0.69  0.79  0.77    Sodium 137  134  132    Potassium 4.5  5.1  5.1    Chloride 99  93  96    Calcium 8.8  9.3  8.4    Total Protein 7.6   7.4    Albumin 3.8   3.3     3.2    Total Bilirubin 0.3   0.3    Alkaline Phosphatase 125   125    AST (SGOT) 34   32    ALT (SGPT) 26   28    WBC   6.24    Hemoglobin   11.6    Hematocrit   37.9    Platelets   364    Total Cholesterol  115     Triglycerides  44     HDL Cholesterol  38     LDL Cholesterol   66     Hemoglobin A1C  7.30     Microalbumin, Urine  5.1                 Assessment and Plan   Diagnoses and all orders for this visit:    1. Type 1 diabetes mellitus with hyperglycemia (Primary)  -     Hemoglobin A1c  -     Basic Metabolic Panel    2. Diabetic peripheral neuropathy associated with type 1 diabetes mellitus    3. History of CAD (coronary artery disease)    4. Mixed hyperlipidemia    Other orders  -     Insulin Lispro (HumaLOG) 100 UNIT/ML injection; 70u/day via insulin pump e10.65  Dispense: 70 mL; Refill: 0  -     Accu-Chek Guide test strip; Dx code E10.65 testing bs 4 x day  Dispense: 400 each; Refill: 1             Follow Up   Return in about 3 months (around 12/11/2024).    Cgm reviewed, near goal  Continue statin  Further recommendations as needed     Patient was given instructions and counseling regarding her condition or for health maintenance advice. Please see specific information pulled into the AVS if appropriate.     KATELIN Malik'

## 2024-09-12 LAB
BUN SERPL-MCNC: 10 MG/DL (ref 8–23)
BUN/CREAT SERPL: 14.5 (ref 7–25)
CALCIUM SERPL-MCNC: 9.3 MG/DL (ref 8.6–10.5)
CHLORIDE SERPL-SCNC: 95 MMOL/L (ref 98–107)
CO2 SERPL-SCNC: 30.2 MMOL/L (ref 22–29)
CREAT SERPL-MCNC: 0.69 MG/DL (ref 0.57–1)
EGFRCR SERPLBLD CKD-EPI 2021: 95.3 ML/MIN/1.73
GLUCOSE SERPL-MCNC: 159 MG/DL (ref 65–99)
HBA1C MFR BLD: 7.9 % (ref 4.8–5.6)
POTASSIUM SERPL-SCNC: 4.8 MMOL/L (ref 3.5–5.2)
SODIUM SERPL-SCNC: 137 MMOL/L (ref 136–145)

## 2024-09-17 ENCOUNTER — HOSPITAL ENCOUNTER (OUTPATIENT)
Dept: CT IMAGING | Facility: HOSPITAL | Age: 69
Discharge: HOME | End: 2024-09-17
Admitting: INTERNAL MEDICINE
Payer: MEDICARE

## 2024-09-17 DIAGNOSIS — R91.1 LUNG NODULE: ICD-10-CM

## 2024-09-17 PROCEDURE — 71250 CT THORAX DX C-: CPT

## 2024-09-30 ENCOUNTER — TRANSCRIBE ORDERS (OUTPATIENT)
Dept: ADMINISTRATIVE | Facility: HOSPITAL | Age: 69
End: 2024-09-30
Payer: MEDICARE

## 2024-09-30 ENCOUNTER — LAB (OUTPATIENT)
Dept: LAB | Facility: HOSPITAL | Age: 69
End: 2024-09-30
Payer: MEDICARE

## 2024-09-30 DIAGNOSIS — J90 PLEURAL EFFUSION: ICD-10-CM

## 2024-09-30 DIAGNOSIS — J90 PLEURAL EFFUSION: Primary | ICD-10-CM

## 2024-09-30 LAB
CHROMATIN AB SERPL-ACNC: <10 IU/ML (ref 0–14)
CRP SERPL-MCNC: 0.36 MG/DL (ref 0–0.5)
ERYTHROCYTE [SEDIMENTATION RATE] IN BLOOD: 72 MM/HR (ref 0–30)

## 2024-09-30 PROCEDURE — 86038 ANTINUCLEAR ANTIBODIES: CPT

## 2024-09-30 PROCEDURE — 36415 COLL VENOUS BLD VENIPUNCTURE: CPT

## 2024-09-30 PROCEDURE — 85652 RBC SED RATE AUTOMATED: CPT

## 2024-09-30 PROCEDURE — 86140 C-REACTIVE PROTEIN: CPT

## 2024-09-30 PROCEDURE — 86431 RHEUMATOID FACTOR QUANT: CPT

## 2024-10-01 LAB — ANA SER QL: NEGATIVE

## 2024-10-04 NOTE — PROGRESS NOTES
NAME: Opal Gutierrez   : 1955    Chief Complaint   Patient presents with    neuropathy associated with MGUS        HPI:  Opal Gutierrez is a 69 y.o.  right-handed female who I am seeing in follow-up regarding diabetic peripheral neuropathy.  She is accompanied by her  who has a history.  She has a past medical history of anemia, CAD, diabetes type 1 (Insulin pump), hyperlipidemia, hypertension, IRVIN, and MI.   I last saw her on 2024, with the following history taken from that note with additions/modifications as indicated:    Patient has a 40-year history of type I diabetes and at least 20-year history of peripheral neuropathy.  She states that what started her current situation is that 6 to 7 months ago her right knee gave out causing her to fall.  The right quad is weak and her knee hurts. There is throbbing in the thigh.  She describes numbness and tingling in the feet and lower legs. The numbness in the feet is fairly significant.  The pain is fairly severe also and she states that the leg is not really any better than it was 6 months ago.  She states that the hands do not seem particularly numb but they do get cold just like her feet.  She was started on gabapentin 300 mg 3 times a day.  She describes some left-sided neck pain.  She tried physical therapy but it did not go very well since her balance was so poor and she could not get much out of the exercise.  Her labs for treatable causes of neuropathy showed an elevated IgG of 1988 associated with MGUS and kappa light chain. Copper 80, cryoglobulin not detected and heavy metals normal. Motor and sensory neuropathy panel was negative for any antibodies attacking the nerves.  She saw Dr. Huang with neurosurgery who recommended cervical epidural injections and physical therapy.  She states that the cervical injections did not help but physical therapy helped, she has better range of motion.  She also followed up with Dr. Peng grimes-onc  "for MGUS who will continue to check labs every 6 months.     Dr. Delong performed an EMG on 11/1/2023 with the following impression:     Complex abnormal study showing evidence of rather severe peripheral neuropathy with superimposed needle exam changes consistent with a femoral neuropathy. I cannot entirely exclude an L3 or 4 radiculopathy instead however no paraspinal abnormalities were seen to demonstrate root level involvement.    History interim    She denies any change in her neuropathic symptoms.  She continues to report numbness, tingling and a cold sensation in the right leg and bilateral feet.  She denies any numbness or tingling in her hands but states that they are cold.  She continues to report some weakness in the right leg.  She denies any recent falls and continues to ambulate with a cane or a walker.  She continues to take gabapentin 300 mg 1-3 times a day depending on her symptoms.  She always takes the nighttime dose.  She reports some drowsiness with the daytime doses so does not take it often.  She denies any neck pain and states that it is \"pretty much gone.\"  She reports better range of motion after physical therapy and continues to do the home exercises.      Past Medical History:   Diagnosis Date    Acute metabolic encephalopathy 05/07/2021    Anemia     Anxiety     Arthritis     Benign hypertension 11/07/2022    Cirrhosis     Coronary artery disease     Depression     Diabetes mellitus type I     Diabetic ketoacidosis with coma associated with type 2 diabetes mellitus 01/03/2021    Esophageal varices     Genital HSV     2 times per year    GERD (gastroesophageal reflux disease)     Heart attack 10/2020    Heart murmur     Hip fracture     right- 2012, left 2013    Hyperlipidemia     Insulin pump titration 03/27/2016    Kidney stone     Myocardial infarction     IRVIN (nonalcoholic steatohepatitis)     Neuropathy     Osteopenia     Osteoporosis     managed by gynecologist.  On Reclast yearly "    Osteoporosis, postmenopausal 07/22/2016    Pancreatitis          Past Surgical History:   Procedure Laterality Date    CAROTID STENT  10/2020    CATARACT EXTRACTION      COLONOSCOPY  2019    normal.    CORONARY STENT PLACEMENT      ENDOSCOPY      HAND SURGERY      for fracture    HIP SURGERY Left     JOINT REPLACEMENT  hip right    TOTAL HIP ARTHROPLASTY Right            Current Outpatient Medications:     Accu-Chek FastClix Lancets misc, Dx code E10.65 Testing bs 4 times daily, Disp: 408 each, Rfl: 1    Accu-Chek Guide test strip, Dx code E10.65 testing bs 4 x day, Disp: 400 each, Rfl: 1    Acetone, Urine, Test (Ketone Test) strip, 1 strip by Other route Daily., Disp: 100 strip, Rfl: 3    aspirin 81 MG EC tablet, Take 1 tablet by mouth Daily., Disp: , Rfl:     atorvastatin (LIPITOR) 20 MG tablet, Take 1 tablet by mouth Daily., Disp: , Rfl:     Cholecalciferol (Vitamin D3) 25 MCG (1000 UT) capsule, TAKE 1 CAPSULE BY MOUTH DAILY IN ADDITION TO VITAMIN D3 5000 UNITS DAILY, Disp: 90 capsule, Rfl: 1    CREON 66541-69362 units capsule delayed-release particles capsule, TK 2 CS PO TID, Disp: , Rfl: 3    FLUoxetine (PROzac) 20 MG capsule, TAKE 1 CAPSULE BY MOUTH EVERY DAY, Disp: 30 capsule, Rfl: 6    furosemide (LASIX) 20 MG tablet, TAKE 1 TABLET BY MOUTH EVERY DAY AS NEEDED FOR SWELLING, Disp: 15 tablet, Rfl: 0    gabapentin (NEURONTIN) 300 MG capsule, Take 1 capsule by mouth 3 (Three) Times a Day., Disp: 270 capsule, Rfl: 1    glucagon (GLUCAGEN) 1 MG injection, Inject 1 mg into the appropriate muscle as directed by prescriber See Admin Instructions. For emergency use for severe low glucose: first inject, then call 911, once awake try to give oral treatment., Disp: 1 kit, Rfl: 3    Insulin Infusion Pump device, Inject 1 each under the skin into the appropriate area as directed., Disp: , Rfl:     Insulin Lispro (HumaLOG) 100 UNIT/ML injection, 70u/day via insulin pump e10.65, Disp: 70 mL, Rfl: 0    pantoprazole  (PROTONIX) 40 MG EC tablet, Take 1 tablet by mouth Daily., Disp: , Rfl:     spironolactone (ALDACTONE) 25 MG tablet, TK 1/2 T PO D, Disp: , Rfl:     traZODone (DESYREL) 150 MG tablet, TAKE 1 TABLET BY MOUTH EVERY NIGHT AT BEDTIME, Disp: 90 tablet, Rfl: 3    Current Facility-Administered Medications:     zoledronic acid (RECLAST) infusion 5 mg, 5 mg, Intravenous, Once, Grayson Levi MD      Family History   Problem Relation Age of Onset    Cancer Father         prostate    Diabetes Father     Heart disease Father     Alzheimer's disease Mother         age 90    Colon cancer Maternal Grandmother 80    Heart disease Brother          Social History     Socioeconomic History    Marital status:    Tobacco Use    Smoking status: Never    Smokeless tobacco: Never   Vaping Use    Vaping status: Never Used   Substance and Sexual Activity    Alcohol use: Not Currently    Drug use: Never    Sexual activity: Not Currently     Partners: Male     Birth control/protection: Other         Allergies   Allergen Reactions    Contrast Dye (Echo Or Unknown Ct/Mr) Other (See Comments)     Terrible back pains    Iodinated Contrast Media Unknown (See Comments)     SEVERE BACK PAIN  SEVERE BACK PAIN  SEVERE BACK PAIN    Penicillins Hives         Pain Scale: 5/10 right leg        ROS:  Review of Systems   Musculoskeletal:  Positive for gait problem.   Neurological:  Positive for numbness. Negative for dizziness, tremors, seizures, syncope, facial asymmetry, speech difficulty, weakness, light-headedness and headaches.   Psychiatric/Behavioral: Negative.         I have reviewed and agree with the above ROS complete by medical assistant.    Physical Exam:  Vitals:    10/10/24 1000   BP: 108/68   Pulse: 75   SpO2: 98%   Weight: 67.1 kg (148 lb)     Pain Score    10/10/24 1000   PainSc: 0-No pain        Orthostatic BP:       Physical Exam  General: Slender white female no acute distress  HEENT: Normocephalic no evidence of  trauma  Neck: Supple.    Heart: Regular rate and rhythm murmur present  Extremities: Radial pulses strong and simultaneous.  No pedal edema.  Hammertoes on the right      Neurological Exam:   Mental Status: Awake, alert, oriented to person, place and time.  Conversant without evidence of an affective disorder, thought disorder, delusions or hallucinations.  Attention span and concentration are normal.  HCF: No aphasia, apraxia or dysarthria.  Recent and remote memory intact.  Knowledge of recent events intact.  CN: I:   II: Visual fields full without left inattention   III, IV, VI: Eye movements intact without nystagmus or ptosis.  Pupils equal round and reactive to light.   V,VII: Light touch and pinprick intact all 3 divisions of V.  Facial muscles symmetrical.   VIII: Hearing intact to finger rub   IX,X: Soft palate elevates symmetrically   XI: Sternomastoid and trapezius are strong.   XII: Tongue midline without atrophy or fasciculations    Motor: Normal tone in the upper and lower extremities.  Reduced bulk and intrinsic hand muscles    Power testing: Significant weakness of intrinsic hand muscles, mild tricep weakness bilaterally.  Moderate weakness of iliopsoas muscles bilaterally but worse on the right, mild right tibialis anterior weakness and minimal left tibialis anterior weakness.  Bilateral toe extensors and flexor weakness.    Reflexes: Upper extremities: Areflexia        Lower extremities: Areflexia        Toe signs:        Clonus:        Gutiérrez's:        Jaw jerk:    Sensory: Light touch: Reduced on the fingers and ankles down        Pinprick: Patchy reduction but patient felt pressure not sticks        Vibration: Absent at the ankles and right knee, reduced on the left knee        Position: Abnormal at the great toes bilaterally        Temperature:    Cerebellar: Finger-to-nose: Normal           Rapid movement: Normal           Heel-to-shin:    Gait and Station: Comes to stand pushing off the  chair.  Broad-based.  Appears antalgic and unsteady.  Right leg externally rotated with questionable circumduction of the right leg.  Ambulated with a quad cane.      Results:    Lab Results   Component Value Date    GLUCOSE 159 (H) 09/11/2024    BUN 10 09/11/2024    CREATININE 0.69 09/11/2024    EGFRIFNONA 82 02/24/2022    EGFRIFAFRI >60 08/31/2022    BCR 14.5 09/11/2024    CO2 30.2 (H) 09/11/2024    CALCIUM 9.3 09/11/2024    PROTENTOTREF 7.4 08/30/2024    ALBUMIN 3.3 (L) 08/30/2024    ALBUMIN 3.2 08/30/2024    LABIL2 0.8 08/30/2024    AST 32 08/30/2024    ALT 28 08/30/2024     Lab Results   Component Value Date    WBC 6.24 08/30/2024    HGB 11.6 (L) 08/30/2024    HCT 37.9 08/30/2024    MCV 85.6 08/30/2024     08/30/2024     Lab Results   Component Value Date    TSH 2.420 05/09/2023    THYROIDAB <9 01/27/2021     Lab Results   Component Value Date    ZLSYZWQX94 428 10/31/2023     Lab Results   Component Value Date    FOLATE 11.0 10/31/2023     Lab Results   Component Value Date    HGBA1C 7.90 (H) 09/11/2024     Lab Results   Component Value Date    ARSENIC 1 01/26/2024     Lab Results   Component Value Date    MERCURY <1.0 01/26/2024     Lab Results   Component Value Date    COPPER 80 01/26/2024     Lab Results   Component Value Date    SEDRATE 72 (H) 09/30/2024     Lab Results   Component Value Date    CRP 0.36 09/30/2024     Lab Results   Component Value Date    HDL 38 (L) 05/24/2024     Lab Results   Component Value Date    LDL 66 05/24/2024     Lab Results   Component Value Date    TRIG 44 05/24/2024         Assessment/:    1.  Severe diabetic peripheral neuropathy-patient has an MGUS which could possibly be contributing to her neuropathy.  She continues to take gabapentin 300 mg 1-3 times a day depending on severity.  She does report drowsiness with the daytime dosages so does not take them consistently but she always takes the nighttime dose.  Most recent hemoglobin A1c 7.9%.  2.  Superimposed right  femoral neuropathy  3.  Cervical spinal stenosis-patient reported improvement in range of motion after physical therapy.  She continues to do exercises at home.  4.  Gait disturbance-continue to ambulate with an assistive device to prevent falls.             Assessment and Plan   Diagnoses and all orders for this visit:    1. Diabetic peripheral neuropathy (Primary)  -     gabapentin (NEURONTIN) 300 MG capsule; Take 1 capsule by mouth 3 (Three) Times a Day.  Dispense: 270 capsule; Refill: 1    2. Antalgic gait    3. Femoral neuropathy, right    4. Cervical spinal stenosis        Ideal targets for risk factor control would be Blood pressure < 130/80, B12 > 500, TSH in normal range and LDL < 70; HbA1c < 6.5 and smoking cessation if applicable. Call 911 for stroke symptoms.  Recommend 150 minutes of physical activity weekly.  Limit alcohol intake.  Follow-up in 4 to 6 months or sooner if needed      Time: Spent 40 minutes in total encounter time. This included time for chart review, obtaining history, performing pertinent physical examination, updating medical information, ordering tests/referrals, discussion of diagnosis, medical management, counseling, and encounter documentation.        KATELIN Sage          Much of this encounter note was dictated utilizing Dragon dictation which is an electronic transcription/translation of spoken language to printed text. The electronic translation of spoken language may permit erroneous, or at times, nonsensical words or phrases to be inadvertently transcribed; Although I have reviewed the note for such errors, some may still exist

## 2024-10-10 ENCOUNTER — OFFICE VISIT (OUTPATIENT)
Dept: NEUROLOGY | Facility: CLINIC | Age: 69
End: 2024-10-10
Payer: MEDICARE

## 2024-10-10 VITALS
HEART RATE: 75 BPM | DIASTOLIC BLOOD PRESSURE: 68 MMHG | OXYGEN SATURATION: 98 % | WEIGHT: 148 LBS | BODY MASS INDEX: 21.92 KG/M2 | SYSTOLIC BLOOD PRESSURE: 108 MMHG

## 2024-10-10 DIAGNOSIS — E11.42 DIABETIC PERIPHERAL NEUROPATHY: Primary | ICD-10-CM

## 2024-10-10 DIAGNOSIS — M48.02 CERVICAL SPINAL STENOSIS: ICD-10-CM

## 2024-10-10 DIAGNOSIS — G57.21 FEMORAL NEUROPATHY, RIGHT: ICD-10-CM

## 2024-10-10 DIAGNOSIS — R26.89 ANTALGIC GAIT: ICD-10-CM

## 2024-10-10 RX ORDER — GABAPENTIN 300 MG/1
300 CAPSULE ORAL 3 TIMES DAILY
Qty: 270 CAPSULE | Refills: 1 | Status: SHIPPED | OUTPATIENT
Start: 2024-10-10

## 2024-10-19 ENCOUNTER — HOSPITAL ENCOUNTER (OUTPATIENT)
Dept: BONE DENSITY | Facility: HOSPITAL | Age: 69
Discharge: HOME | End: 2024-10-19
Payer: MEDICARE

## 2024-10-19 DIAGNOSIS — M81.0 SENILE OSTEOPOROSIS: ICD-10-CM

## 2024-10-19 DIAGNOSIS — Z78.0 POSTMENOPAUSAL: ICD-10-CM

## 2024-10-19 PROCEDURE — 77080 DXA BONE DENSITY AXIAL: CPT

## 2024-12-03 RX ORDER — TRAZODONE HYDROCHLORIDE 150 MG/1
TABLET ORAL
Qty: 90 TABLET | Refills: 3 | Status: SHIPPED | OUTPATIENT
Start: 2024-12-03

## 2024-12-06 ENCOUNTER — OFFICE VISIT (OUTPATIENT)
Dept: FAMILY MEDICINE CLINIC | Facility: CLINIC | Age: 69
End: 2024-12-06
Payer: MEDICARE

## 2024-12-06 VITALS
SYSTOLIC BLOOD PRESSURE: 130 MMHG | OXYGEN SATURATION: 99 % | HEIGHT: 69 IN | RESPIRATION RATE: 18 BRPM | WEIGHT: 150 LBS | BODY MASS INDEX: 22.22 KG/M2 | HEART RATE: 80 BPM | DIASTOLIC BLOOD PRESSURE: 70 MMHG

## 2024-12-06 DIAGNOSIS — G62.9 NEUROPATHY: ICD-10-CM

## 2024-12-06 DIAGNOSIS — I25.10 CORONARY ARTERY DISEASE INVOLVING NATIVE CORONARY ARTERY OF NATIVE HEART WITHOUT ANGINA PECTORIS: ICD-10-CM

## 2024-12-06 DIAGNOSIS — E10.40 TYPE 1 DIABETES MELLITUS WITH DIABETIC NEUROPATHY: ICD-10-CM

## 2024-12-06 DIAGNOSIS — F10.11 H/O ALCOHOL ABUSE: ICD-10-CM

## 2024-12-06 DIAGNOSIS — E78.5 HYPERLIPIDEMIA LDL GOAL <70: ICD-10-CM

## 2024-12-06 DIAGNOSIS — M81.0 SENILE OSTEOPOROSIS: Primary | ICD-10-CM

## 2024-12-06 DIAGNOSIS — F32.A ANXIETY AND DEPRESSION: ICD-10-CM

## 2024-12-06 DIAGNOSIS — F41.9 ANXIETY AND DEPRESSION: ICD-10-CM

## 2024-12-06 NOTE — PROGRESS NOTES
"Subjective     Opal Gutierrez is a 69 y.o. female who presents with   Chief Complaint   Patient presents with    senile osteoporosis       History of Present Illness     Ostepenia now and on Reclast with last fracture several years ago.  She ambulates with a cane.      She's been gaining weight and is not as active.  She does have a treadmill but uses a cane for neuropathy and balance. She has a normal appetite.  She     She's on Gabapentin for neuropathy.  She does have a treadmill.     She sees Ortho for her knee and to get an injection on the right in 2 weeks.     She also sees Dr. Bruner for CAD, HTN, cardiomyopathy on aspirin, furosmide and spironlactone.  She has hyperlipidemia on atorvastatin at goal at last test in May.     She has a history of alcohol abuse but quit drinking about 3 years ago when diagnosed with liver disease.     The following data was reviewed by: Aleja Lozano MD on 12/06/2024:    Data reviewed : Consultant notes from Dr. Bruner and endocrine and labs      Review of Systems   Constitutional:  Positive for fatigue. Negative for chills, diaphoresis and fever.   HENT:  Negative for congestion and sore throat.    Respiratory:  Negative for cough and shortness of breath.    Cardiovascular:  Negative for chest pain.   Gastrointestinal:  Negative for abdominal pain and nausea.   Genitourinary:  Negative for dysuria.   Musculoskeletal:  Positive for neck pain. Negative for myalgias.   Skin:  Negative for rash.   Neurological:  Positive for numbness. Negative for weakness and headaches.        Objective     /70   Pulse 80   Resp 18   Ht 175 cm (68.9\")   Wt 68 kg (150 lb)   SpO2 99%   Breastfeeding No   BMI 22.22 kg/m²     Physical Exam  Constitutional:       Appearance: Normal appearance.   Cardiovascular:      Rate and Rhythm: Normal rate and regular rhythm.      Heart sounds: Normal heart sounds.   Pulmonary:      Effort: Pulmonary effort is normal.      Breath " sounds: Normal breath sounds.   Musculoskeletal:      Comments: Using  cane for ambulation   Neurological:      Mental Status: She is alert.   Psychiatric:         Behavior: Behavior normal.         Procedures     Assessment & Plan   Diagnoses and all orders for this visit:    1. Senile osteoporosis (Primary)  Assessment & Plan:  On Reclast due to prior fracture.      2. Coronary artery disease involving native coronary artery of native heart without angina pectoris    3. Hyperlipidemia LDL goal <70    4. Type 1 diabetes mellitus with diabetic neuropathy    5. H/O alcohol abuse    6. Anxiety and depression  Assessment & Plan:  On fluoxetine      7. Neuropathy  Assessment & Plan:  On Gabapentin through Dr. Charles Delong/Katerina Richard         BMI is within normal parameters. No other follow-up for BMI required.     Discussion    Patient Instructions   Ask about doing a thyroid blood test when you go to Endocrinology.     We talked about exercise and to consider a reclined exercise bike.    Your vaccines are up to date.     Follow up with Dr. Devi, Dr. Delong and Dr. Bruner.               Aleja Lozano MD

## 2024-12-06 NOTE — PATIENT INSTRUCTIONS
Ask about doing a thyroid blood test when you go to Endocrinology.     We talked about exercise and to consider a reclined exercise bike.    Your vaccines are up to date.     Follow up with Dr. Devi, Dr. Delong and Dr. Bruner.

## 2024-12-11 ENCOUNTER — OFFICE VISIT (OUTPATIENT)
Dept: ENDOCRINOLOGY | Age: 69
End: 2024-12-11
Payer: MEDICARE

## 2024-12-11 VITALS
HEIGHT: 69 IN | BODY MASS INDEX: 21.92 KG/M2 | OXYGEN SATURATION: 98 % | SYSTOLIC BLOOD PRESSURE: 116 MMHG | WEIGHT: 148 LBS | TEMPERATURE: 98 F | HEART RATE: 71 BPM | DIASTOLIC BLOOD PRESSURE: 68 MMHG

## 2024-12-11 DIAGNOSIS — Z86.79 HISTORY OF CAD (CORONARY ARTERY DISEASE): ICD-10-CM

## 2024-12-11 DIAGNOSIS — E10.42 DIABETIC PERIPHERAL NEUROPATHY ASSOCIATED WITH TYPE 1 DIABETES MELLITUS: ICD-10-CM

## 2024-12-11 DIAGNOSIS — E10.65 TYPE 1 DIABETES MELLITUS WITH HYPERGLYCEMIA: Primary | ICD-10-CM

## 2024-12-11 DIAGNOSIS — E78.2 MIXED HYPERLIPIDEMIA: ICD-10-CM

## 2024-12-11 PROCEDURE — 3051F HG A1C>EQUAL 7.0%<8.0%: CPT | Performed by: NURSE PRACTITIONER

## 2024-12-11 PROCEDURE — 3074F SYST BP LT 130 MM HG: CPT | Performed by: NURSE PRACTITIONER

## 2024-12-11 PROCEDURE — 3078F DIAST BP <80 MM HG: CPT | Performed by: NURSE PRACTITIONER

## 2024-12-11 PROCEDURE — 95251 CONT GLUC MNTR ANALYSIS I&R: CPT | Performed by: NURSE PRACTITIONER

## 2024-12-11 PROCEDURE — 99214 OFFICE O/P EST MOD 30 MIN: CPT | Performed by: NURSE PRACTITIONER

## 2024-12-11 NOTE — PROGRESS NOTES
"Chief Complaint  Diabetes    Subjective        Opal Gutierrez presents to [facility] END[facility]CRINOLOGY  History of Present Illness    PCP requesting TSH to be checked with weight gain ~ 10 pounds this year  Upcoming steroid injection for her leg pain     Type 1 diabetes, diagnosed in her 20s and started insulin shortly after, has been on insulin pump since 2014  DM regimen: Medtronic 780g with smart guard technology   Back up plan: insulin syringes   Glucagon: yes  Known DM complications: neuropathy: on gabapentin, CAD; angioplasty with stent to the LAD October 2020; Ischemic Cardiomyopathy, on atorvastatin 20 mg    diabetic eye exam: 7/2024     Cgm review 11/28/24-12/11/24  High 25%  Very high 14%  61% time in range  0% low  Smart guard 95%  Gmi 7.5%  TDD 50u  Bolus 65%    Objective   Vital Signs:  /68   Pulse 71   Temp 98 °F (36.7 °C) (Oral)   Ht 175 cm (68.9\")   Wt 67.1 kg (148 lb)   SpO2 98%   BMI 21.92 kg/m²   Estimated body mass index is 21.92 kg/m² as calculated from the following:    Height as of this encounter: 175 cm (68.9\").    Weight as of this encounter: 67.1 kg (148 lb).    BMI is within normal parameters. No other follow-up for BMI required.      Physical Exam  Vitals reviewed.   Constitutional:       General: She is not in acute distress.  HENT:      Head: Normocephalic and atraumatic.   Cardiovascular:      Rate and Rhythm: Normal rate.   Pulmonary:      Effort: Pulmonary effort is normal. No respiratory distress.   Musculoskeletal:         General: No signs of injury. Normal range of motion.      Cervical back: Normal range of motion and neck supple.   Skin:     General: Skin is warm and dry.   Neurological:      Mental Status: She is alert and oriented to person, place, and time. Mental status is at baseline.   Psychiatric:         Mood and Affect: Mood normal.         Behavior: Behavior normal.         Thought Content: Thought content normal.         Judgment: " Judgment normal.          Result Review :  The following data was reviewed by: KATELIN Malik on 12/11/2024:  Common labs          5/24/2024    12:02 8/30/2024    10:50 9/11/2024    14:18   Common Labs   Glucose 121  263  159    BUN 7  13  10    Creatinine 0.79  0.77  0.69    Sodium 134  132  137    Potassium 5.1  5.1  4.8    Chloride 93  96  95    Calcium 9.3  8.4  9.3    Total Protein  7.4     Albumin  3.3     3.2     Total Bilirubin  0.3     Alkaline Phosphatase  125     AST (SGOT)  32     ALT (SGPT)  28     WBC  6.24     Hemoglobin  11.6     Hematocrit  37.9     Platelets  364     Total Cholesterol 115      Triglycerides 44      HDL Cholesterol 38      LDL Cholesterol  66      Hemoglobin A1C 7.30   7.90    Microalbumin, Urine 5.1                  Assessment and Plan   Diagnoses and all orders for this visit:    1. Type 1 diabetes mellitus with hyperglycemia (Primary)  -     Lipid Panel  -     Hemoglobin A1c  -     Comprehensive Metabolic Panel  -     Microalbumin / Creatinine Urine Ratio - Urine, Clean Catch  -     TSH    2. Diabetic peripheral neuropathy associated with type 1 diabetes mellitus    3. History of CAD (coronary artery disease)    4. Mixed hyperlipidemia             Follow Up   Return in about 3 months (around 3/11/2025).    Feels her BS are too low during the night, target glucose increase from 100 to 110  Labs today  Cgm reviewed, favorable, not quite at goal  Continue statin    Patient was given instructions and counseling regarding her condition or for health maintenance advice. Please see specific information pulled into the AVS if appropriate.     KATELIN Malik

## 2024-12-12 LAB
ALBUMIN SERPL-MCNC: 3.6 G/DL (ref 3.5–5.2)
ALBUMIN/GLOB SERPL: 0.9 G/DL
ALP SERPL-CCNC: 103 U/L (ref 39–117)
ALT SERPL-CCNC: 13 U/L (ref 1–33)
AST SERPL-CCNC: 20 U/L (ref 1–32)
BILIRUB SERPL-MCNC: 0.3 MG/DL (ref 0–1.2)
BUN SERPL-MCNC: 10 MG/DL (ref 8–23)
BUN/CREAT SERPL: 13.3 (ref 7–25)
CALCIUM SERPL-MCNC: 8.9 MG/DL (ref 8.6–10.5)
CHLORIDE SERPL-SCNC: 98 MMOL/L (ref 98–107)
CHOLEST SERPL-MCNC: 140 MG/DL (ref 0–200)
CO2 SERPL-SCNC: 31.1 MMOL/L (ref 22–29)
CREAT SERPL-MCNC: 0.75 MG/DL (ref 0.57–1)
EGFRCR SERPLBLD CKD-EPI 2021: 87.4 ML/MIN/1.73
GLOBULIN SER CALC-MCNC: 4.2 GM/DL
GLUCOSE SERPL-MCNC: 73 MG/DL (ref 65–99)
HBA1C MFR BLD: 7.4 % (ref 4.8–5.6)
HDLC SERPL-MCNC: 38 MG/DL (ref 40–60)
IMP & REVIEW OF LAB RESULTS: NORMAL
LDLC SERPL CALC-MCNC: 91 MG/DL (ref 0–100)
POTASSIUM SERPL-SCNC: 4 MMOL/L (ref 3.5–5.2)
PROT SERPL-MCNC: 7.8 G/DL (ref 6–8.5)
SODIUM SERPL-SCNC: 138 MMOL/L (ref 136–145)
TRIGL SERPL-MCNC: 48 MG/DL (ref 0–150)
TSH SERPL DL<=0.005 MIU/L-ACNC: 1.88 UIU/ML (ref 0.27–4.2)
UNABLE TO VOID: NORMAL
VLDLC SERPL CALC-MCNC: 11 MG/DL (ref 5–40)

## 2025-01-28 RX ORDER — INSULIN LISPRO 100 [IU]/ML
INJECTION, SOLUTION INTRAVENOUS; SUBCUTANEOUS
Qty: 70 ML | Refills: 0 | Status: SHIPPED | OUTPATIENT
Start: 2025-01-28

## 2025-01-28 NOTE — TELEPHONE ENCOUNTER
Rx Refill Note  Requested Prescriptions     Pending Prescriptions Disp Refills    Insulin Lispro (humaLOG) 100 UNIT/ML injection [Pharmacy Med Name: INSULIN LISPRO 100U/ML VIAL 10ML] 70 mL 0     Sig: USE 70 UNITS DAILY VIA INSULIN PUMP      Last office visit with prescribing clinician: 12/11/2024   Last telemedicine visit with prescribing clinician: Visit date not found   Next office visit with prescribing clinician: 3/11/2025                         Would you like a call back once the refill request has been completed: [] Yes [] No    If the office needs to give you a call back, can they leave a voicemail: [] Yes [] No    Bertha Mercedes MA  01/28/25, 16:19 EST

## 2025-02-18 ENCOUNTER — TELEPHONE (OUTPATIENT)
Dept: ENDOCRINOLOGY | Age: 70
End: 2025-02-18
Payer: MEDICARE

## 2025-02-18 NOTE — TELEPHONE ENCOUNTER
Caller: [facility]    Best call back number: [phone]    Patient is needing: RECEIVED FORM FOR INSULIN PUMP BUT 2 SECTIONS WERE NOT COMPLETED ON SECTION 7 AND 8, STATED TO COMPLETE THOSE SECTIONS AND SIGN AND DATE IT

## 2025-02-19 NOTE — TELEPHONE ENCOUNTER
Called patient to see how often she changes her infusion/reservoirs and she said once every 5 days.     Filled out missing information and faxing back the paperwork.

## 2025-02-27 ENCOUNTER — TELEPHONE (OUTPATIENT)
Dept: ONCOLOGY | Facility: CLINIC | Age: 70
End: 2025-02-27

## 2025-02-27 NOTE — TELEPHONE ENCOUNTER
"    Caller: Opal Gutierrez \"SHAY\"    Relationship to patient: Self    Best call back number: [phone]      Chief complaint: SICK    Type of visit: LAB     Requested date: 3/4/2025 CALL TO R/S     If rescheduling, when is the original appointment: 2/28/2025  "

## 2025-03-03 ENCOUNTER — LAB (OUTPATIENT)
Dept: OTHER | Facility: HOSPITAL | Age: 70
End: 2025-03-03
Payer: MEDICARE

## 2025-03-03 DIAGNOSIS — D47.2 MONOCLONAL GAMMOPATHY: ICD-10-CM

## 2025-03-03 LAB
ALBUMIN SERPL-MCNC: 3.1 G/DL (ref 3.5–5.2)
ALBUMIN/GLOB SERPL: 0.7 G/DL
ALP SERPL-CCNC: 94 U/L (ref 39–117)
ALT SERPL W P-5'-P-CCNC: 11 U/L (ref 1–33)
ANION GAP SERPL CALCULATED.3IONS-SCNC: 6.5 MMOL/L (ref 5–15)
AST SERPL-CCNC: 25 U/L (ref 1–32)
BASOPHILS # BLD AUTO: 0.06 10*3/MM3 (ref 0–0.2)
BASOPHILS NFR BLD AUTO: 0.8 % (ref 0–1.5)
BILIRUB SERPL-MCNC: 0.3 MG/DL (ref 0–1.2)
BUN SERPL-MCNC: 7 MG/DL (ref 8–23)
BUN/CREAT SERPL: 10.3 (ref 7–25)
CALCIUM SPEC-SCNC: 8.7 MG/DL (ref 8.6–10.5)
CHLORIDE SERPL-SCNC: 99 MMOL/L (ref 98–107)
CO2 SERPL-SCNC: 30.5 MMOL/L (ref 22–29)
CREAT SERPL-MCNC: 0.68 MG/DL (ref 0.57–1)
DEPRECATED RDW RBC AUTO: 42.5 FL (ref 37–54)
EGFRCR SERPLBLD CKD-EPI 2021: 95.6 ML/MIN/1.73
EOSINOPHIL # BLD AUTO: 0.33 10*3/MM3 (ref 0–0.4)
EOSINOPHIL NFR BLD AUTO: 4.7 % (ref 0.3–6.2)
ERYTHROCYTE [DISTWIDTH] IN BLOOD BY AUTOMATED COUNT: 13.7 % (ref 12.3–15.4)
GLOBULIN UR ELPH-MCNC: 4.2 GM/DL
GLUCOSE SERPL-MCNC: 139 MG/DL (ref 65–99)
HCT VFR BLD AUTO: 40.1 % (ref 34–46.6)
HGB BLD-MCNC: 12.4 G/DL (ref 12–15.9)
IMM GRANULOCYTES # BLD AUTO: 0.02 10*3/MM3 (ref 0–0.05)
IMM GRANULOCYTES NFR BLD AUTO: 0.3 % (ref 0–0.5)
LYMPHOCYTES # BLD AUTO: 1.74 10*3/MM3 (ref 0.7–3.1)
LYMPHOCYTES NFR BLD AUTO: 24.6 % (ref 19.6–45.3)
MCH RBC QN AUTO: 26.3 PG (ref 26.6–33)
MCHC RBC AUTO-ENTMCNC: 30.9 G/DL (ref 31.5–35.7)
MCV RBC AUTO: 85.1 FL (ref 79–97)
MONOCYTES # BLD AUTO: 0.39 10*3/MM3 (ref 0.1–0.9)
MONOCYTES NFR BLD AUTO: 5.5 % (ref 5–12)
NEUTROPHILS NFR BLD AUTO: 4.52 10*3/MM3 (ref 1.7–7)
NEUTROPHILS NFR BLD AUTO: 64.1 % (ref 42.7–76)
NRBC BLD AUTO-RTO: 0 /100 WBC (ref 0–0.2)
PLATELET # BLD AUTO: 368 10*3/MM3 (ref 140–450)
PMV BLD AUTO: 9.2 FL (ref 6–12)
POTASSIUM SERPL-SCNC: 3.6 MMOL/L (ref 3.5–5.2)
PROT SERPL-MCNC: 7.3 G/DL (ref 6–8.5)
RBC # BLD AUTO: 4.71 10*6/MM3 (ref 3.77–5.28)
SODIUM SERPL-SCNC: 136 MMOL/L (ref 136–145)
WBC NRBC COR # BLD AUTO: 7.06 10*3/MM3 (ref 3.4–10.8)

## 2025-03-03 PROCEDURE — 85025 COMPLETE CBC W/AUTO DIFF WBC: CPT | Performed by: NURSE PRACTITIONER

## 2025-03-03 PROCEDURE — 84165 PROTEIN E-PHORESIS SERUM: CPT | Performed by: NURSE PRACTITIONER

## 2025-03-03 PROCEDURE — 36415 COLL VENOUS BLD VENIPUNCTURE: CPT

## 2025-03-03 PROCEDURE — 83521 IG LIGHT CHAINS FREE EACH: CPT | Performed by: NURSE PRACTITIONER

## 2025-03-03 PROCEDURE — 82784 ASSAY IGA/IGD/IGG/IGM EACH: CPT | Performed by: NURSE PRACTITIONER

## 2025-03-03 PROCEDURE — 80053 COMPREHEN METABOLIC PANEL: CPT | Performed by: NURSE PRACTITIONER

## 2025-03-03 PROCEDURE — 86334 IMMUNOFIX E-PHORESIS SERUM: CPT | Performed by: NURSE PRACTITIONER

## 2025-03-04 ENCOUNTER — HOSPITAL ENCOUNTER (OUTPATIENT)
Dept: ULTRASOUND IMAGING | Facility: HOSPITAL | Age: 70
Discharge: HOME | End: 2025-03-04
Admitting: FAMILY MEDICINE
Payer: MEDICARE

## 2025-03-04 DIAGNOSIS — N63.12 MASS OF UPPER INNER QUADRANT OF RIGHT BREAST: ICD-10-CM

## 2025-03-04 DIAGNOSIS — R92.8 ABNORMAL ULTRASOUND OF BREAST: Primary | ICD-10-CM

## 2025-03-04 PROCEDURE — 76642 ULTRASOUND BREAST LIMITED: CPT

## 2025-03-05 LAB
ALBUMIN SERPL ELPH-MCNC: 2.7 G/DL (ref 2.9–4.4)
ALBUMIN/GLOB SERPL: 0.7 {RATIO} (ref 0.7–1.7)
ALPHA1 GLOB SERPL ELPH-MCNC: 0.3 G/DL (ref 0–0.4)
ALPHA2 GLOB SERPL ELPH-MCNC: 1 G/DL (ref 0.4–1)
B-GLOBULIN SERPL ELPH-MCNC: 0.8 G/DL (ref 0.7–1.3)
GAMMA GLOB SERPL ELPH-MCNC: 2.2 G/DL (ref 0.4–1.8)
GLOBULIN SER-MCNC: 4.3 G/DL (ref 2.2–3.9)
IGA SERPL-MCNC: 270 MG/DL (ref 87–352)
IGG SERPL-MCNC: 2142 MG/DL (ref 586–1602)
IGM SERPL-MCNC: 144 MG/DL (ref 26–217)
INTERPRETATION SERPL IEP-IMP: ABNORMAL
KAPPA LC FREE SER-MCNC: 98.3 MG/L (ref 3.3–19.4)
KAPPA LC FREE/LAMBDA FREE SER: 3.52 {RATIO} (ref 0.26–1.65)
LABORATORY COMMENT REPORT: ABNORMAL
LAMBDA LC FREE SERPL-MCNC: 27.9 MG/L (ref 5.7–26.3)
M PROTEIN SERPL ELPH-MCNC: 0.8 G/DL
PROT SERPL-MCNC: 7 G/DL (ref 6–8.5)

## 2025-03-05 NOTE — PROGRESS NOTES
"[facility] GROUP OUTPATIENT FOLLOW UP CLINIC VISIT    REASON FOR FOLLOW-UP:    IgG kappa monoclonal gammopathy     HISTORY OF PRESENT ILLNESS:  Opal Gutierrez is a 69 y.o. female who returns today for follow up of the above issue.      She had sounds like a viral upper respiratory infection recently.  She notes some ongoing shortness of breath after that but otherwise is doing okay.    REVIEW OF SYSTEMS:  As per the HPI    PHYSICAL EXAMINATION:    Vitals:    03/07/25 0857   BP: 143/82   Pulse: 88   Resp: 16   Temp: 98.4 °F (36.9 °C)   SpO2: 94%   Weight: 67.5 kg (148 lb 12.8 oz)   Height: 175 cm (68.9\")       General:  No acute distress, awake, alert and oriented  Skin:  Warm and dry, no visible rash  HEENT:  Normocephalic/atraumatic.    Chest:  Normal respiratory effort.  Lungs clear to auscultation bilaterally today.  Heart: Regular rate and rhythm  Extremities:  No visible clubbing, cyanosis, or edema  Neuro/psych:  Grossly nonfocal.  Normal mood and affect.        DIAGNOSTIC DATA:  EDISON, PE & Free LT Chains, Ser (03/03/2025 10:07)  Comprehensive Metabolic Panel (03/03/2025 10:07)  CBC & Differential (03/03/2025 10:07)      IMAGING:    None reviewed    ASSESSMENT:  This is a 69 y.o. female with:    *Poorly controlled type 1 diabetes     *Peripheral neuropathy secondary to type 1 diabetes  Following regularly with neurology     *IgG kappa monoclonal gammopathy  Serum protein electrophoresis with immunofixation on 1/26/2024 with a 0.7 g IgG kappa monoclonal protein.  IgG 1988.  Almost certainly MGUS and extremely doubtful that this is contributing to neuropathy  Repeat labs 8/30/2024 without change with IgG kappa monoclonal protein noted, IgG 1940, M spike 0.7  3/3/2025: IgG 2142, 0.8 g M spike, serum free light chain kappa/lambda ratio improved at 3.52     *History of left pleural effusion  Thoracentesis 3/14/2024 with 550 mL removed, negative for malignant cells  Today she reports that she has worsening " shortness of breath and dyspnea on exertion after what sounds like a recent viral upper respiratory infection    PLAN:  PA and lateral chest x-ray today.  I will follow-up the result with her.  Labs in 6 months and I will see her back after that for follow-up

## 2025-03-07 ENCOUNTER — HOSPITAL ENCOUNTER (OUTPATIENT)
Dept: GENERAL RADIOLOGY | Facility: HOSPITAL | Age: 70
Discharge: HOME | End: 2025-03-07
Payer: MEDICARE

## 2025-03-07 ENCOUNTER — OFFICE VISIT (OUTPATIENT)
Dept: ONCOLOGY | Facility: CLINIC | Age: 70
End: 2025-03-07
Payer: MEDICARE

## 2025-03-07 VITALS
WEIGHT: 148.8 LBS | BODY MASS INDEX: 22.04 KG/M2 | HEIGHT: 69 IN | SYSTOLIC BLOOD PRESSURE: 143 MMHG | TEMPERATURE: 98.4 F | OXYGEN SATURATION: 94 % | DIASTOLIC BLOOD PRESSURE: 82 MMHG | RESPIRATION RATE: 16 BRPM | HEART RATE: 88 BPM

## 2025-03-07 DIAGNOSIS — D47.2 MONOCLONAL GAMMOPATHY: Primary | ICD-10-CM

## 2025-03-07 DIAGNOSIS — R06.02 SHORTNESS OF BREATH: ICD-10-CM

## 2025-03-07 PROCEDURE — 71046 X-RAY EXAM CHEST 2 VIEWS: CPT

## 2025-03-07 RX ORDER — CARVEDILOL 6.25 MG/1
6.25 TABLET ORAL
COMMUNITY
Start: 2025-01-28

## 2025-03-10 NOTE — PROGRESS NOTES
03/14/25 0001   Pre-Procedure Phone Call   Procedure Time Verified Yes   Arrival Time 0930   Procedure Location Verified Yes   Medical History Reviewed Yes   NPO Status Reinforced No   Ride and Caregiver Arranged N/A     Spoke to Opal. Informed her to arrive one hour prior to biopsy, can eat and drink and drive self to and from, advised re type of clothing and avoidance of OTC blood thinners for 5-7 days prior to biopsy. Left our contact number should she need to reach us    Mamm Nurse spoke to Angela in Cardiologist, Dr. Flavio Bruner's office to request instructions regarding patients Aspirin (whether she can HOLD for the next few days).  Biopsy is scheduled for Friday, 3-14-25. Angela will fax a response back to us.

## 2025-03-11 ENCOUNTER — TELEPHONE (OUTPATIENT)
Dept: ONCOLOGY | Facility: CLINIC | Age: 70
End: 2025-03-11
Payer: MEDICARE

## 2025-03-11 ENCOUNTER — OFFICE VISIT (OUTPATIENT)
Dept: ENDOCRINOLOGY | Age: 70
End: 2025-03-11
Payer: MEDICARE

## 2025-03-11 VITALS
BODY MASS INDEX: 21.3 KG/M2 | HEART RATE: 85 BPM | SYSTOLIC BLOOD PRESSURE: 112 MMHG | DIASTOLIC BLOOD PRESSURE: 70 MMHG | HEIGHT: 69 IN | OXYGEN SATURATION: 95 % | WEIGHT: 143.8 LBS | TEMPERATURE: 97.6 F

## 2025-03-11 DIAGNOSIS — E78.2 MIXED HYPERLIPIDEMIA: ICD-10-CM

## 2025-03-11 DIAGNOSIS — Z96.41 INSULIN PUMP STATUS: ICD-10-CM

## 2025-03-11 DIAGNOSIS — Z86.79 HISTORY OF CAD (CORONARY ARTERY DISEASE): ICD-10-CM

## 2025-03-11 DIAGNOSIS — E10.42 DIABETIC PERIPHERAL NEUROPATHY ASSOCIATED WITH TYPE 1 DIABETES MELLITUS: ICD-10-CM

## 2025-03-11 DIAGNOSIS — E10.65 TYPE 1 DIABETES MELLITUS WITH HYPERGLYCEMIA: Primary | ICD-10-CM

## 2025-03-11 LAB — HBA1C MFR BLD: 7.1 % (ref 4.8–5.6)

## 2025-03-11 PROCEDURE — 95251 CONT GLUC MNTR ANALYSIS I&R: CPT | Performed by: NURSE PRACTITIONER

## 2025-03-11 PROCEDURE — 3078F DIAST BP <80 MM HG: CPT | Performed by: NURSE PRACTITIONER

## 2025-03-11 PROCEDURE — 99214 OFFICE O/P EST MOD 30 MIN: CPT | Performed by: NURSE PRACTITIONER

## 2025-03-11 PROCEDURE — 3074F SYST BP LT 130 MM HG: CPT | Performed by: NURSE PRACTITIONER

## 2025-03-11 RX ORDER — INSULIN LISPRO 100 [IU]/ML
INJECTION, SOLUTION INTRAVENOUS; SUBCUTANEOUS
Qty: 70 ML | Refills: 0 | Status: SHIPPED | OUTPATIENT
Start: 2025-03-11

## 2025-03-11 NOTE — TELEPHONE ENCOUNTER
Returned call and relayed results to patient. Results sent to Dr. Khoury to see if there is anything else we need to add at this time. Yahaira Perez RN

## 2025-03-11 NOTE — TELEPHONE ENCOUNTER
"Caller: Opal Gutierrez \"SHAY\"    Relationship: Self    Best call back number: [phone]    What test was performed: X-RAY    When was the test performed: 3/7/25    Where was the test performed:         "

## 2025-03-11 NOTE — PROGRESS NOTES
"Chief Complaint  Diabetes    Subjective        Opal Gutierrez presents to [facility] ENDOCRINOLOGY  History of Present Illness    Type 1 diabetes, diagnosed in her 20s and started insulin shortly after, has been on insulin pump since   DM regimen: Medtronic 780g with smart guard technology, has a new pump to prgram and start, old pump out of [place]    Back up plan: insulin syringes   Glucagon: yes, unsure if it is  or not   Known DM complications: neuropathy: on gabapentin, CAD; angioplasty with stent to the LAD 2020; Ischemic Cardiomyopathy, on atorvastatin 20 mg    diabetic eye exam: 2024     Cgm review 25-3/11/25  High 18%  Very high 3%  77% time in range  2% low  Smart guard 94%  Gmi 6.7%  TDD 48u  Bolus 75%    Objective   Vital Signs:  /70   Pulse 85   Temp 97.6 °F (36.4 °C) (Oral)   Ht 175 cm (68.9\")   Wt 65.2 kg (143 lb 12.8 oz)   SpO2 95%   BMI 21.30 kg/m²   Estimated body mass index is 21.3 kg/m² as calculated from the following:    Height as of this encounter: 175 cm (68.9\").    Weight as of this encounter: 65.2 kg (143 lb 12.8 oz).    BMI is within normal parameters. No other follow-up for BMI required.      Physical Exam  Vitals reviewed.   Constitutional:       General: She is not in acute distress.  HENT:      Head: Normocephalic and atraumatic.   Cardiovascular:      Rate and Rhythm: Normal rate.   Pulmonary:      Effort: Pulmonary effort is normal. No respiratory distress.   Musculoskeletal:         General: No signs of injury. Normal range of motion.      Cervical back: Normal range of motion and neck supple.   Skin:     General: Skin is warm and dry.   Neurological:      Mental Status: She is alert and oriented to person, place, and time. Mental status is at baseline.   Psychiatric:         Mood and Affect: Mood normal.         Behavior: Behavior normal.         Thought Content: Thought content normal.         Judgment: Judgment normal. "        Result Review :  The following data was reviewed by: KATELIN Malik on 03/11/2025:  Common labs          9/11/2024    14:18 12/11/2024    13:14 3/3/2025    10:07   Common Labs   Glucose 159  73  139    BUN 10  10  7    Creatinine 0.69  0.75  0.68    Sodium 137  138  136    Potassium 4.8  4.0  3.6    Chloride 95  98  99    Calcium 9.3  8.9  8.7    Albumin  3.6  3.1     2.7    Total Bilirubin  0.3  0.3    Alkaline Phosphatase  103  94    AST (SGOT)  20  25    ALT (SGPT)  13  11    WBC   7.06    Hemoglobin   12.4    Hematocrit   40.1    Platelets   368    Total Cholesterol  140     Triglycerides  48     HDL Cholesterol  38     LDL Cholesterol   91     Hemoglobin A1C 7.90  7.40                 Assessment and Plan   Diagnoses and all orders for this visit:    1. Type 1 diabetes mellitus with hyperglycemia (Primary)  -     Insulin Lispro (humaLOG) 100 UNIT/ML injection; USE 70 UNITS DAILY VIA INSULIN PUMP  Dispense: 70 mL; Refill: 0  -     glucagon (GLUCAGEN) 1 MG injection; Inject 1 mg into the appropriate muscle as directed by prescriber See Admin Instructions. For emergency use for severe low glucose: first inject, then call 911, once awake try to give oral treatment.  Dispense: 1 kit; Refill: 3  -     Hemoglobin A1c    2. Diabetic peripheral neuropathy associated with type 1 diabetes mellitus    3. History of CAD (coronary artery disease)    4. Mixed hyperlipidemia    5. Insulin pump status             Follow Up   Return in about 3 months (around 6/11/2025).    Carb ratio from 5-9p increased from 9 to 12 and 9p-12a increased from 12 to 15 given PP hypoglycemia, bolusing after dinner meals   Reviewed better bolusing habits before meals instead of after meals, for missed meal boluses, only bolus half of carbs consumed  Met with medtronic educator today to prgram new pump  Labs today  Cgm reviewed- improve bolusing before meals to reduce risk of PP hypoglycemia events   Additional recommendations to follow  as needed  Continue statin     Patient was given instructions and counseling regarding her condition or for health maintenance advice. Please see specific information pulled into the AVS if appropriate.       KATELIN Malik

## 2025-03-12 NOTE — TELEPHONE ENCOUNTER
Called patient to relay message from Dr. Khoury: She should follow up with cardiology. Nothing to do from my standpoint. AJH. No answer and voicemail box full. Will try to call again later. Yahaira Perez RN

## 2025-03-14 ENCOUNTER — HOSPITAL ENCOUNTER (OUTPATIENT)
Dept: ULTRASOUND IMAGING | Facility: HOSPITAL | Age: 70
Discharge: HOME | End: 2025-03-14
Payer: MEDICARE

## 2025-03-14 ENCOUNTER — HOSPITAL ENCOUNTER (OUTPATIENT)
Dept: MAMMOGRAPHY | Facility: HOSPITAL | Age: 70
Discharge: HOME | End: 2025-03-14
Payer: MEDICARE

## 2025-03-14 VITALS
DIASTOLIC BLOOD PRESSURE: 68 MMHG | HEART RATE: 90 BPM | OXYGEN SATURATION: 95 % | RESPIRATION RATE: 14 BRPM | SYSTOLIC BLOOD PRESSURE: 117 MMHG | TEMPERATURE: 98 F

## 2025-03-14 DIAGNOSIS — R92.8 ABNORMAL ULTRASOUND OF BREAST: ICD-10-CM

## 2025-03-14 PROCEDURE — 77065 DX MAMMO INCL CAD UNI: CPT

## 2025-03-14 PROCEDURE — A4648 IMPLANTABLE TISSUE MARKER: HCPCS

## 2025-03-14 PROCEDURE — 25010000002 LIDOCAINE 1 % SOLUTION: Performed by: RADIOLOGY

## 2025-03-14 PROCEDURE — 88360 TUMOR IMMUNOHISTOCHEM/MANUAL: CPT | Performed by: FAMILY MEDICINE

## 2025-03-14 PROCEDURE — 88342 IMHCHEM/IMCYTCHM 1ST ANTB: CPT | Performed by: FAMILY MEDICINE

## 2025-03-14 PROCEDURE — 88305 TISSUE EXAM BY PATHOLOGIST: CPT | Performed by: FAMILY MEDICINE

## 2025-03-14 PROCEDURE — 88341 IMHCHEM/IMCYTCHM EA ADD ANTB: CPT | Performed by: FAMILY MEDICINE

## 2025-03-14 PROCEDURE — 25010000002 LIDOCAINE 1% - EPINEPHRINE 1:100000 1 %-1:100000 SOLUTION: Performed by: RADIOLOGY

## 2025-03-14 RX ORDER — LIDOCAINE HYDROCHLORIDE 10 MG/ML
10 INJECTION, SOLUTION INFILTRATION; PERINEURAL ONCE
Status: COMPLETED | OUTPATIENT
Start: 2025-03-14 | End: 2025-03-14

## 2025-03-14 RX ORDER — LIDOCAINE HYDROCHLORIDE AND EPINEPHRINE 10; 10 MG/ML; UG/ML
10 INJECTION, SOLUTION INFILTRATION; PERINEURAL ONCE
Status: COMPLETED | OUTPATIENT
Start: 2025-03-14 | End: 2025-03-14

## 2025-03-14 RX ADMIN — LIDOCAINE HYDROCHLORIDE 10 ML: 10 INJECTION, SOLUTION INFILTRATION; PERINEURAL at 11:00

## 2025-03-14 RX ADMIN — LIDOCAINE HYDROCHLORIDE,EPINEPHRINE BITARTRATE 10 ML: 10; .01 INJECTION, SOLUTION INFILTRATION; PERINEURAL at 11:00

## 2025-03-14 NOTE — NURSING NOTE
Biopsy site to Right breast clear with Dermabond dry and intact. No firmness or swelling noted at or around biopsy site. Denies pain. Ice pack with protective covering applied to biopsy site. Discharge instructions discussed with understanding voiced by patient. Copies provided to patient. No distress noted. To home via private vehicle, accompanied by her  Percy.

## 2025-03-14 NOTE — NURSING NOTE
Dr. Barkley in with patient and her  Percy. Patient consented and site marked. All patient questions addressed.

## 2025-03-15 ENCOUNTER — TELEPHONE (OUTPATIENT)
Dept: INTERVENTIONAL RADIOLOGY/VASCULAR | Facility: HOSPITAL | Age: 70
End: 2025-03-15
Payer: MEDICARE

## 2025-03-17 LAB
CYTO UR: NORMAL
LAB AP CASE REPORT: NORMAL
LAB AP INTRADEPARTMENTAL CONSULT: NORMAL
LAB AP SPECIAL STAINS: NORMAL
LAB AP SYNOPTIC CHECKLIST: NORMAL
PATH REPORT.FINAL DX SPEC: NORMAL
PATH REPORT.GROSS SPEC: NORMAL

## 2025-03-19 ENCOUNTER — DOCUMENTATION (OUTPATIENT)
Dept: FAMILY MEDICINE CLINIC | Facility: CLINIC | Age: 70
End: 2025-03-19
Payer: MEDICARE

## 2025-03-19 DIAGNOSIS — R06.09 DYSPNEA ON EXERTION: Primary | ICD-10-CM

## 2025-03-19 DIAGNOSIS — I50.9 CONGESTIVE HEART FAILURE, UNSPECIFIED HF CHRONICITY, UNSPECIFIED HEART FAILURE TYPE: ICD-10-CM

## 2025-03-19 DIAGNOSIS — R93.89 ABNORMAL CHEST X-RAY: ICD-10-CM

## 2025-03-19 RX ORDER — FUROSEMIDE 20 MG/1
20 TABLET ORAL DAILY PRN
Qty: 15 TABLET | Refills: 0 | Status: SHIPPED | OUTPATIENT
Start: 2025-03-19

## 2025-03-19 NOTE — PROGRESS NOTES
"She is here with her .  They both had a respiratory infection several weeks ago and she's been struggling with being short of breath with exertion since then.  She's not swelling.  She talked to Dr. Khoury about this at her recent visit.  He ordered an x-ray that did show \"Right more than left basilar atelectasis/infiltrate and mild pleural  Effusion\".     She is not coughing anything up.  She may have some furosemide at home.   I am sending some in and I want you to take two today and then one daily for the next 3 days and then report back on how you're feeling.  We may need an antibiotic.     I will place an order for a follow up chest x-ray in 2 weeks  to see if there is any change.   "

## 2025-04-10 ENCOUNTER — OFFICE VISIT (OUTPATIENT)
Dept: NEUROLOGY | Facility: CLINIC | Age: 70
End: 2025-04-10
Payer: MEDICARE

## 2025-04-10 VITALS
SYSTOLIC BLOOD PRESSURE: 144 MMHG | DIASTOLIC BLOOD PRESSURE: 76 MMHG | OXYGEN SATURATION: 93 % | WEIGHT: 147 LBS | HEART RATE: 87 BPM | HEIGHT: 69 IN | BODY MASS INDEX: 21.77 KG/M2

## 2025-04-10 DIAGNOSIS — E11.42 DIABETIC PERIPHERAL NEUROPATHY: Primary | ICD-10-CM

## 2025-04-10 DIAGNOSIS — G57.21 FEMORAL NEUROPATHY, RIGHT: ICD-10-CM

## 2025-04-10 DIAGNOSIS — R26.89 ANTALGIC GAIT: ICD-10-CM

## 2025-04-10 RX ORDER — GABAPENTIN 300 MG/1
300 CAPSULE ORAL 3 TIMES DAILY
Qty: 270 CAPSULE | Refills: 1 | Status: SHIPPED | OUTPATIENT
Start: 2025-04-10

## 2025-04-10 RX ORDER — HYDROCODONE BITARTRATE AND ACETAMINOPHEN 5; 325 MG/1; MG/1
1 TABLET ORAL EVERY 6 HOURS PRN
COMMUNITY
Start: 2025-04-07 | End: 2025-05-07

## 2025-04-10 NOTE — PROGRESS NOTES
NAME: Opal Gutierrez   : 1955    Chief Complaint   Patient presents with    Diabetic peripheral neuropathy        HPI:  Opal Gutierrez is a 70 y.o.  right-handed female who I am seeing in follow-up regarding diabetic peripheral neuropathy.  She is accompanied by her  who adds to the history.  She has a past medical history of anemia, CAD, diabetes type 1 (Insulin pump), hyperlipidemia, hypertension, IVRIN, and MI.  I last saw her on 10/10/2024, with the following history taken from that note with additions/modifications as indicated:    Patient has a 40-year history of type I diabetes and at least 20-year history of peripheral neuropathy.  She states that what started her current situation is that 6 to 7 months ago her right knee gave out causing her to fall.  The right quad is weak and her knee hurts. There is throbbing in the thigh.  She describes numbness and tingling in the feet and lower legs. The numbness in the feet is fairly significant.  The pain is fairly severe also and she states that the leg is not really any better than it was 6 months ago.  She states that the hands do not seem particularly numb but they do get cold just like her feet.  She was started on gabapentin 300 mg 3 times a day.  She describes some left-sided neck pain.  She tried physical therapy but it did not go very well since her balance was so poor and she could not get much out of the exercise.  Her labs for treatable causes of neuropathy showed an elevated IgG of 1988 associated with MGUS and kappa light chain. Copper 80, cryoglobulin not detected and heavy metals normal. Motor and sensory neuropathy panel was negative for any antibodies attacking the nerves.  She saw Dr. Huang with neurosurgery who recommended cervical epidural injections and physical therapy.  She states that the cervical injections did not help but physical therapy helped, she has better range of motion.  She also followed up with Dr. Khoury  heme-onc for MGUS who will continue to check labs every 6 months.      Dr. Delong performed an EMG on 11/1/2023 with the following impression:     Complex abnormal study showing evidence of rather severe peripheral neuropathy with superimposed needle exam changes consistent with a femoral neuropathy. I cannot entirely exclude an L3 or 4 radiculopathy instead however no paraspinal abnormalities were seen to demonstrate root level involvement.    History interim    Patient states about 2 months ago she started having worsening numbness and burning from her right buttock down to her right knee.  She denies any neck or back pain.  She continues to report numbness, burning and coldness in her hands and feet that is unchanged.  She denies any recent falls and continues to ambulate with a cane.  She continues to take gabapentin 300 mg 1-3 times a day depending on her symptoms.  She reports some drowsiness if she takes it during the day.  She states she always takes the nighttime dose.  She states the gabapentin is effective in helping the burning sensation.    Reviewing patient's MRI lumbar spine from 11/20/2023 demonstrated multilevel degenerative disease, mild foraminal stenosis at L4-L5 bilaterally and at L5-S1 on the left.  At L5-S1 a broad-based disc osteophyte complex is present which abuts but does not displace the left S1 nerve root.      Past Medical History:   Diagnosis Date    Acute metabolic encephalopathy 05/07/2021    Anemia     Anxiety     Arthritis     Benign hypertension 11/07/2022    Cancer breast    Cirrhosis     Coronary artery disease     Depression     Diabetes mellitus type I     Diabetic ketoacidosis with coma associated with type 2 diabetes mellitus 01/03/2021    Difficulty walking     Esophageal varices     Genital HSV     2 times per year    GERD (gastroesophageal reflux disease)     Heart attack 10/2020    Heart murmur     Hip fracture     right- 2012, left 2013    History of cataract      Hyperlipidemia     Insulin pump titration 03/27/2016    Kidney stone     Myocardial infarction     IRVIN (nonalcoholic steatohepatitis)     Neuropathy     Neuropathy in diabetes     Osteopenia     Osteoporosis     managed by gynecologist.  On Reclast yearly    Osteoporosis, postmenopausal 07/22/2016    Pancreatitis     Peripheral neuropathy          Past Surgical History:   Procedure Laterality Date    CARDIAC CATHETERIZATION      11/2020, 01/2021    CAROTID ENDARTERECTOMY Right     CAROTID STENT  10/2020    CATARACT EXTRACTION Bilateral     COLONOSCOPY  2019    normal.    CORONARY STENT PLACEMENT  2020    ENDOSCOPY      HAND SURGERY      for fracture    HIP SURGERY Left     JOINT REPLACEMENT  hip right    TOTAL HIP ARTHROPLASTY Right            Current Outpatient Medications:     Accu-Chek FastClix Lancets misc, Dx code E10.65 Testing bs 4 times daily, Disp: 408 each, Rfl: 1    Accu-Chek Guide test strip, Dx code E10.65 testing bs 4 x day, Disp: 400 each, Rfl: 1    Acetone, Urine, Test (Ketone Test) strip, 1 strip by Other route Daily., Disp: 100 strip, Rfl: 3    aspirin 81 MG EC tablet, Take 1 tablet by mouth Daily., Disp: , Rfl:     atorvastatin (LIPITOR) 20 MG tablet, Take 1 tablet by mouth Daily., Disp: , Rfl:     carvedilol (COREG) 6.25 MG tablet, Take 1 tablet by mouth., Disp: , Rfl:     Cholecalciferol (Vitamin D3) 25 MCG (1000 UT) capsule, TAKE 1 CAPSULE BY MOUTH DAILY IN ADDITION TO VITAMIN D3 5000 UNITS DAILY, Disp: 90 capsule, Rfl: 1    CREON 27701-61352 units capsule delayed-release particles capsule, TK 2 CS PO TID, Disp: , Rfl: 3    FLUoxetine (PROzac) 20 MG capsule, TAKE 1 CAPSULE BY MOUTH EVERY DAY, Disp: 30 capsule, Rfl: 6    furosemide (LASIX) 20 MG tablet, Take 1 tablet by mouth Daily As Needed (swelling or shortness of breath)., Disp: 15 tablet, Rfl: 0    gabapentin (NEURONTIN) 300 MG capsule, Take 1 capsule by mouth 3 (Three) Times a Day., Disp: 270 capsule, Rfl: 1    glucagon (GLUCAGEN) 1 MG  injection, Inject 1 mg into the appropriate muscle as directed by prescriber See Admin Instructions. For emergency use for severe low glucose: first inject, then call 911, once awake try to give oral treatment., Disp: 1 kit, Rfl: 3    HYDROcodone-acetaminophen (NORCO) 5-325 MG per tablet, Take 1 tablet by mouth Every 6 (Six) Hours As Needed for Moderate Pain or Severe Pain., Disp: , Rfl:     Insulin Infusion Pump device, Inject 1 each under the skin into the appropriate area as directed., Disp: , Rfl:     Insulin Lispro (humaLOG) 100 UNIT/ML injection, USE 70 UNITS DAILY VIA INSULIN PUMP, Disp: 70 mL, Rfl: 0    pantoprazole (PROTONIX) 40 MG EC tablet, Take 1 tablet by mouth Daily., Disp: , Rfl:     spironolactone (ALDACTONE) 25 MG tablet, TK 1/2 T PO D, Disp: , Rfl:     traZODone (DESYREL) 150 MG tablet, TAKE 1 TABLET BY MOUTH EVERY NIGHT AT BEDTIME, Disp: 90 tablet, Rfl: 3    Current Facility-Administered Medications:     zoledronic acid (RECLAST) infusion 5 mg, 5 mg, Intravenous, Once, Grayson Levi MD      Family History   Problem Relation Age of Onset    Alzheimer's disease Mother         age 90    Dementia Mother     Cancer Father         prostate    Diabetes Father     Heart disease Father     Heart disease Brother     Colon cancer Maternal Grandmother 80         Social History     Socioeconomic History    Marital status:      Spouse name: Percy   Tobacco Use    Smoking status: Never     Passive exposure: Never    Smokeless tobacco: Never   Vaping Use    Vaping status: Never Used   Substance and Sexual Activity    Alcohol use: Not Currently    Drug use: Never    Sexual activity: Not Currently     Partners: Male     Birth control/protection: Other         Allergies   Allergen Reactions    Contrast Dye (Echo Or Unknown Ct/Mr) Other (See Comments)     Terrible back pains    Iodinated Contrast Media Unknown (See Comments)     SEVERE BACK PAIN  SEVERE BACK PAIN  SEVERE BACK PAIN    Penicillins Hives  "        Pain Scale: 5/10 right leg        ROS:  Review of Systems   Musculoskeletal:  Positive for gait problem. Negative for back pain and neck pain.   Neurological:  Positive for weakness and numbness.       Physical Exam:  Vitals:    04/10/25 1104   BP: 144/76   Pulse: 87   SpO2: 93%   Weight: 66.7 kg (147 lb)   Height: 175 cm (68.9\")       Orthostatic BP:       Physical Exam  General: Slender white female no acute distress  HEENT: Normocephalic no evidence of trauma  Neck: Supple.    Heart: Regular rate and rhythm  Extremities: Radial pulses strong and simultaneous.  No pedal edema.      Neurological Exam:   Mental Status: Awake, alert, oriented to person, place and time.  Conversant without evidence of an affective disorder, thought disorder, delusions or hallucinations.  Attention span and concentration are normal.  HCF: No aphasia, apraxia or dysarthria.  Recent and remote memory intact.  Knowledge of recent events intact.  CN: I:   II: Visual fields full without left inattention   III, IV, VI: Eye movements intact without nystagmus or ptosis.  Pupils equal round and reactive to light.   V,VII: Light touch and pinprick intact all 3 divisions of V.  Facial muscles symmetrical.   VIII: Hearing intact to finger rub   IX,X: Soft palate elevates symmetrically   XI: Sternomastoid and trapezius are strong.   XII: Tongue midline without atrophy or fasciculations    Motor: Normal tone in the upper and lower extremities.  Reduced bulk in intrinsic hand muscles    Power testing: Significant weakness of intrinsic hand muscles and mild tricep weakness bilaterally.  Moderate weakness of iliopsoas muscles bilaterally but worse on the right, mild right tibialis anterior weakness and minimal left tibialis anterior weakness.    Reflexes: Upper extremities: Areflexia        Lower extremities: Areflexia        Toe signs:        Clonus:     Sensory: Light touch: Diffusely intact in the arms reduced at the right ankle down        " Pinprick: Diffusely intact in the arms reduced from the knees down        Vibration: Absent at the ankles reduced at the knee        Position:        Temperature:    Cerebellar: Finger-to-nose: Normal           Rapid movement: Normal           Heel-to-shin:    Gait and Station: Comes to stand pushing off the chair.  Broad-based.  Appears antalgic and unsteady.  Ambulates with a cane.  No drift.      Results:    Lab Results   Component Value Date    GLUCOSE 139 (H) 03/03/2025    BUN 7 (L) 03/03/2025    CREATININE 0.68 03/03/2025    EGFRIFNONA 82 02/24/2022    EGFRIFAFRI >60 08/31/2022    BCR 10.3 03/03/2025    CO2 30.5 (H) 03/03/2025    CALCIUM 8.7 03/03/2025    ALBUMIN 3.1 (L) 03/03/2025    ALBUMIN 2.7 (L) 03/03/2025    LABIL2 1.0 (L) 08/31/2022    AST 25 03/03/2025    ALT 11 03/03/2025     Lab Results   Component Value Date    WBC 7.06 03/03/2025    HGB 12.4 03/03/2025    HCT 40.1 03/03/2025    MCV 85.1 03/03/2025     03/03/2025     Lab Results   Component Value Date    TSH 1.880 12/11/2024    THYROIDAB <9 01/27/2021     Lab Results   Component Value Date    JOFETABN40 428 10/31/2023     Lab Results   Component Value Date    FOLATE 11.0 10/31/2023     Lab Results   Component Value Date    HGBA1C 7.10 (H) 03/11/2025     Lab Results   Component Value Date    ARSENIC 1 01/26/2024     Lab Results   Component Value Date    MERCURY <1.0 01/26/2024     Lab Results   Component Value Date    COPPER 80 01/26/2024     Lab Results   Component Value Date    SEDRATE 72 (H) 09/30/2024     Lab Results   Component Value Date    CRP 0.36 09/30/2024         Assessment:    1.  Severe diabetic peripheral neuropathy-patient also has an MGUS.  She continues to take gabapentin 300 mg 1-3 times a day depending on severity of symptoms.  She states she plans on taking at least twice a day due to the increased burning in her right leg.  She states that gabapentin is effective.  Most recent hemoglobin A1c 7.1%.  2.  Superimposed right  femoral neuropathy-worsening burning posterior buttock to right knee.  Discussed returning to physical therapy for stretching.  Patient in agreement.  Denies any back pain.  3.  Gait disturbance-no recent falls           Assessment and Plan   Diagnoses and all orders for this visit:    1. Diabetic peripheral neuropathy (Primary)  -     gabapentin (NEURONTIN) 300 MG capsule; Take 1 capsule by mouth 3 (Three) Times a Day.  Dispense: 270 capsule; Refill: 1    2. Femoral neuropathy, right  -     Ambulatory Referral to Physical Therapy for Evaluation & Treatment    3. Antalgic gait  -     Ambulatory Referral to Physical Therapy for Evaluation & Treatment        Ideal targets for risk factor control would be Blood pressure < 130/80, B12 > 500, TSH in normal range and LDL < 70; HbA1c < 6.5 and smoking cessation if applicable. Call 911 for stroke symptoms.  Recommend 150 minutes of physical activity weekly.  Limit alcohol intake.  Continue gabapentin 300 mg 3 times a day.  Javier reviewed.  Side effects reviewed.  Referral to physical therapy for strengthening for right femoral neuropathy versus right sided sciatica  Follow-up in 3 to 4 months or sooner if needed      Time: Spent 40 minutes in total encounter time. This included time for chart review, obtaining history, performing pertinent physical examination, updating medical information, ordering tests/referrals, discussion of diagnosis, medical management, counseling, and encounter documentation.        KATELIN Sage          Much of this encounter note was dictated utilizing Dragon dictation which is an electronic transcription/translation of spoken language to printed text. The electronic translation of spoken language may permit erroneous, or at times, nonsensical words or phrases to be inadvertently transcribed; Although I have reviewed the note for such errors, some may still exist.    Portions of this assessment have been copied from previous  documentation which has been thoroughly reviewed and updated as appropriate.

## 2025-04-21 ENCOUNTER — TELEPHONE (OUTPATIENT)
Dept: ONCOLOGY | Facility: CLINIC | Age: 70
End: 2025-04-21
Payer: MEDICARE

## 2025-04-21 NOTE — TELEPHONE ENCOUNTER
Caller: LORY    Relationship:     Best call back number: [phone] CAN LEAVE DETAILED MESSAGE    Who are you requesting to speak with (clinical staff, provider,  specific staff member): NURSE    Do you know the name of the person who called: KALIA    What was the call regarding: KALIA HAD BREAST SURGERY ON 4/7.   REFERRING HER BACK TO DR. RODRIGUEZ FOR A F/U.    IS THE 9/2025 APPT OKAY OR DOES SHE NEED TO SEE  SOONER?    CALL TO DISCUSS

## 2025-04-22 NOTE — TELEPHONE ENCOUNTER
Called patient to let her know I would return call once Dr. Khoury has a chance to review Dr. Freeman's notes. Pt v/u. Yahaira Perez RN

## 2025-04-22 NOTE — TELEPHONE ENCOUNTER
"  Caller: Opal Gutierrez \"SHAY\"    Relationship: Self    Best call back number: [phone]    What was the call regarding:  PT FOLLOWING UP IN REGARDS TO SCHEDULING A FU WITH DR RODRIGUEZ.  STATES IT'S BEEN 2 WEEKS SINCE HER LUMPECTOMY AND SHE WAS RECOMMENDED TO SCHEDULE A FU     "

## 2025-04-23 ENCOUNTER — DOCUMENTATION (OUTPATIENT)
Dept: FAMILY MEDICINE CLINIC | Facility: CLINIC | Age: 70
End: 2025-04-23
Payer: MEDICARE

## 2025-04-23 RX ORDER — AZITHROMYCIN 250 MG/1
TABLET, FILM COATED ORAL
Qty: 6 TABLET | Refills: 0 | Status: SHIPPED | OUTPATIENT
Start: 2025-04-23

## 2025-04-23 NOTE — PROGRESS NOTES
She she had had a respiratory infection back in March Dr. Khoury ordered a chest x-ray that showed some possible infiltrate but she has not had any treatment of that.  We have discussed this when she was in last time with her  and I ordered an x-ray but that has not been done.  I decided to go ahead and send a Z-Jameel for her but I want the follow-up x-ray to get done as well.

## 2025-04-25 ENCOUNTER — HOSPITAL ENCOUNTER (OUTPATIENT)
Dept: GENERAL RADIOLOGY | Facility: HOSPITAL | Age: 70
Discharge: HOME | End: 2025-04-25
Payer: MEDICARE

## 2025-04-25 DIAGNOSIS — R93.89 ABNORMAL CHEST X-RAY: ICD-10-CM

## 2025-04-25 DIAGNOSIS — R06.09 DYSPNEA ON EXERTION: ICD-10-CM

## 2025-04-25 PROCEDURE — 71046 X-RAY EXAM CHEST 2 VIEWS: CPT

## 2025-05-07 NOTE — PROGRESS NOTES
[facility] GROUP OUTPATIENT FOLLOW UP CLINIC VISIT    REASON FOR FOLLOW-UP:    IgG kappa monoclonal gammopathy   Recently diagnosed right breast cancer    HISTORY OF PRESENT ILLNESS:  Opal Gutierrez is a 70 y.o. female who returns today for follow up of the above issue.      Normal screening mammogram 7/1/24 8/22/24: bilateral breast u/s with small oviod solid mass at 2 o'clock in the right breast measuring 7 x 4 x 8 mm    3/4/25: mass in the right breast at 2 o'clock measuring 0.8 x 0.5 x 1.1 cm    3/14/25: u/s guided biopsy with invasive mammary carcinoma, NST (ductal) grade 1 (2/2/1) measuring 5 mm maximally involving 5 fragments. ER positive at %, PRpositive at %, Her2 1+ and Ki-67 9%.     MRI with extremely dense breasts. Biopsy proven malignancy in the upper inner posterior right breast up to 11 mm on MRI, suboptimal image quality.     She saw Dr. Freeman and on 4/7/25 had a lumpectomy with sentinel node biopsy. 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 It is noted that there is only 1.5 mm of residual invasive disease in the definitive specimen. DCIS present at 6 mm, grade II. No lymphatic or vascular invasion. 9 mm margin for invasive disease and 3.5 mm margin for DCIS (closest margin posterior). 3 sentinel nodes examined, all negative. Stage pT1a, pN0.     She saw Dr. Hubbard at [place] who had an extensive conversation with her about adjuvant radiation. Assuming she takes anti-estrogen therapy he believes that radiation can be omitted.     She required another thoracentesis on 4/30, with cytology negative.     At this point she is doing well.  She states that she is healed nicely from the procedures.    REVIEW OF SYSTEMS:  As per the HPI    PHYSICAL EXAMINATION:    Vitals:    05/09/25 0757   BP: 148/75   Pulse: 87   Resp: 16   Temp: 99.1 °F (37.3 °C)   TempSrc: Oral   SpO2: 93%  Procedure:  EXCISION  BIOPSY LESION/MASS BACK (Right Back)    Relevant Problems   CARDIO   (+) Essential hypertriglyceridemia      MUSCULOSKELETAL   (+) Osteoarthritis of cervical spine      NEURO/PSYCH   (+) Anxiety   (+) Aphasia as late effect of cerebrovascular accident   (+) H/O sleep apnea        Physical Exam    Airway    Mallampati score: II  TM Distance: >3 FB  Neck ROM: full     Dental   No notable dental hx     Cardiovascular  Cardiovascular exam normal    Pulmonary  Pulmonary exam normal     Other Findings        Anesthesia Plan  ASA Score- 2     Anesthesia Type- IV sedation with anesthesia with ASA Monitors  Additional Monitors:   Airway Plan:     Comment: I discussed risks (reviewed with patient on the anesthesia consent form), benefits and alternatives of monitored sedation including the possibility under sedation to have recall or mild discomfort          Plan Factors-Exercise tolerance (METS): >4 METS  Chart reviewed  EKG reviewed  Patient summary reviewed  Induction- intravenous  Postoperative Plan- Plan for postoperative opioid use  Informed Consent- Anesthetic plan and risks discussed with patient  I personally reviewed this patient with the CRNA  Discussed and agreed on the Anesthesia Plan with the CRNA  Batool Roca "  Weight: 63.9 kg (140 lb 12.8 oz)   Height: 175 cm (68.9\")   PainSc: 0-No pain         General:  No acute distress, awake, alert and oriented  Skin:  Warm and dry, no visible rash  HEENT:  Normocephalic/atraumatic.    Heart: Regular rate and rhythm  Extremities:  No visible clubbing, cyanosis, or edema  Neuro/psych:  Grossly nonfocal.  Normal mood and affect.        DIAGNOSTIC DATA:  CBC & Differential (05/09/2025 07:51)   Comprehensive Metabolic Panel (05/09/2025 07:51)     IMAGING:    None reviewed    ASSESSMENT:  This is a 70 y.o. female with:    *Poorly controlled type 1 diabetes     *Peripheral neuropathy secondary to type 1 diabetes  Following regularly with neurology     *IgG kappa monoclonal gammopathy  Serum protein electrophoresis with immunofixation on 1/26/2024 with a 0.7 g IgG kappa monoclonal protein.  IgG 1988.  Almost certainly MGUS and extremely doubtful that this is contributing to neuropathy  Repeat labs 8/30/2024 without change with IgG kappa monoclonal protein noted, IgG 1940, M spike 0.7  3/3/2025: IgG 2142, 0.8 g M spike, serum free light chain kappa/lambda ratio improved at 3.52     *Recurrent pleural effusion  Left thoracentesis 3/14/2024 with 550 mL removed, negative for malignant cells  Right thoracentesis at [place] 4/30/25 of 1250 mL  Etiology of recurrent pleural effusions is unclear.  Does not appear to be malignant.    *Stage pT1a, pN0 ER/NV positive, Her2 1+ ductal carcinoma of the right breast  Normal screening mammogram 7/1/24 8/22/24: bilateral breast u/s with small oviod solid mass at 2 o'clock in the right breast measuring 7 x 4 x 8 mm  3/4/25: mass in the right breast at 2 o'clock measuring 0.8 x 0.5 x 1.1 cm  3/14/25: u/s guided biopsy with invasive mammary carcinoma, NST (ductal) grade 1 (2/2/1) measuring 5 mm maximally involving 5 fragments. ER positive at %, NV positive at %, Her2 1+ and Ki-67 9%.   MRI with extremely dense breasts. Biopsy proven malignancy in " the upper inner posterior right breast up to 11 mm on MRI, suboptimal image quality.   She saw Dr. Freeman and on 4/7/25 had a lumpectomy with sentinel node biopsy. 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 It is noted that there is only 1.5 mm of residual invasive disease in the definitive specimen. DCIS present at 6 mm, grade II. No lymphatic or vascular invasion. 9 mm margin for invasive disease and 3.5 mm margin for DCIS (closest margin posterior). 3 sentinel nodes examined, all negative. Stage pT1a, pN0.   She saw Dr. Hubbard at [place] who had an extensive conversation with her about adjuvant radiation. Assuming she takes anti-estrogen therapy he believes that radiation can be omitted.     *Osteopenia by DEXA scan 10/19/2024    PLAN:  Today I discussed with her and her  her breast cancer diagnosis and reviewed the pathology.  I discussed the rationale for adjuvant antiestrogen therapy.  Based on the size of her tumor I think that adjuvant anastrozole 1 mg daily with plans for 5 years of therapy is reasonable.  I gave them some written information regarding this today.  She agrees to proceed.  Outlined potential adverse effects including bone loss and arthralgias among others.  She agrees to proceed and I electronically sent a prescription to her pharmacy today.  I encouraged her to notify us if she has significant adverse effects.  She is not eager to consider chemotherapy and therefore I do not believe that an Oncotype DX recurrence score is necessary  She already has a follow-up appointment with me scheduled in about 4 months with labs and we will plan for her to keep that appointment at that time.    I spent 45 minutes in this visit today reviewing her record, communicating with her and her , placing orders, coordinating care, documenting the encounter.    Gareth Khoury MD  05/09/25

## 2025-05-09 ENCOUNTER — OFFICE VISIT (OUTPATIENT)
Dept: ONCOLOGY | Facility: CLINIC | Age: 70
End: 2025-05-09
Payer: MEDICARE

## 2025-05-09 ENCOUNTER — LAB (OUTPATIENT)
Dept: OTHER | Facility: HOSPITAL | Age: 70
End: 2025-05-09
Payer: MEDICARE

## 2025-05-09 VITALS
SYSTOLIC BLOOD PRESSURE: 148 MMHG | HEART RATE: 87 BPM | DIASTOLIC BLOOD PRESSURE: 75 MMHG | OXYGEN SATURATION: 93 % | TEMPERATURE: 99.1 F | RESPIRATION RATE: 16 BRPM | WEIGHT: 140.8 LBS | HEIGHT: 69 IN | BODY MASS INDEX: 20.86 KG/M2

## 2025-05-09 DIAGNOSIS — D47.2 MONOCLONAL GAMMOPATHY: ICD-10-CM

## 2025-05-09 DIAGNOSIS — R06.02 SHORTNESS OF BREATH: ICD-10-CM

## 2025-05-09 DIAGNOSIS — D47.2 MONOCLONAL GAMMOPATHY: Primary | ICD-10-CM

## 2025-05-09 LAB
ALBUMIN SERPL-MCNC: 3.3 G/DL (ref 3.5–5.2)
ALBUMIN/GLOB SERPL: 0.8 G/DL
ALP SERPL-CCNC: 98 U/L (ref 39–117)
ALT SERPL W P-5'-P-CCNC: 13 U/L (ref 1–33)
ANION GAP SERPL CALCULATED.3IONS-SCNC: 5.8 MMOL/L (ref 5–15)
AST SERPL-CCNC: 19 U/L (ref 1–32)
BASOPHILS # BLD AUTO: 0.04 10*3/MM3 (ref 0–0.2)
BASOPHILS NFR BLD AUTO: 0.5 % (ref 0–1.5)
BILIRUB SERPL-MCNC: 0.4 MG/DL (ref 0–1.2)
BUN SERPL-MCNC: 8 MG/DL (ref 8–23)
BUN/CREAT SERPL: 11.8 (ref 7–25)
CALCIUM SPEC-SCNC: 8.9 MG/DL (ref 8.6–10.5)
CHLORIDE SERPL-SCNC: 94 MMOL/L (ref 98–107)
CO2 SERPL-SCNC: 33.2 MMOL/L (ref 22–29)
CREAT SERPL-MCNC: 0.68 MG/DL (ref 0.57–1)
DEPRECATED RDW RBC AUTO: 42.2 FL (ref 37–54)
EGFRCR SERPLBLD CKD-EPI 2021: 95 ML/MIN/1.73
EOSINOPHIL # BLD AUTO: 0.22 10*3/MM3 (ref 0–0.4)
EOSINOPHIL NFR BLD AUTO: 3 % (ref 0.3–6.2)
ERYTHROCYTE [DISTWIDTH] IN BLOOD BY AUTOMATED COUNT: 13.8 % (ref 12.3–15.4)
GLOBULIN UR ELPH-MCNC: 4.3 GM/DL
GLUCOSE SERPL-MCNC: 275 MG/DL (ref 65–99)
HCT VFR BLD AUTO: 38.7 % (ref 34–46.6)
HGB BLD-MCNC: 12 G/DL (ref 12–15.9)
IMM GRANULOCYTES # BLD AUTO: 0.02 10*3/MM3 (ref 0–0.05)
IMM GRANULOCYTES NFR BLD AUTO: 0.3 % (ref 0–0.5)
LYMPHOCYTES # BLD AUTO: 1.54 10*3/MM3 (ref 0.7–3.1)
LYMPHOCYTES NFR BLD AUTO: 21.1 % (ref 19.6–45.3)
MCH RBC QN AUTO: 26 PG (ref 26.6–33)
MCHC RBC AUTO-ENTMCNC: 31 G/DL (ref 31.5–35.7)
MCV RBC AUTO: 83.8 FL (ref 79–97)
MONOCYTES # BLD AUTO: 0.58 10*3/MM3 (ref 0.1–0.9)
MONOCYTES NFR BLD AUTO: 7.9 % (ref 5–12)
NEUTROPHILS NFR BLD AUTO: 4.9 10*3/MM3 (ref 1.7–7)
NEUTROPHILS NFR BLD AUTO: 67.2 % (ref 42.7–76)
NRBC BLD AUTO-RTO: 0 /100 WBC (ref 0–0.2)
PLATELET # BLD AUTO: 389 10*3/MM3 (ref 140–450)
PMV BLD AUTO: 8.7 FL (ref 6–12)
POTASSIUM SERPL-SCNC: 4.8 MMOL/L (ref 3.5–5.2)
PROT SERPL-MCNC: 7.6 G/DL (ref 6–8.5)
RBC # BLD AUTO: 4.62 10*6/MM3 (ref 3.77–5.28)
SODIUM SERPL-SCNC: 133 MMOL/L (ref 136–145)
WBC NRBC COR # BLD AUTO: 7.3 10*3/MM3 (ref 3.4–10.8)

## 2025-05-09 PROCEDURE — 36415 COLL VENOUS BLD VENIPUNCTURE: CPT

## 2025-05-09 PROCEDURE — 85025 COMPLETE CBC W/AUTO DIFF WBC: CPT | Performed by: INTERNAL MEDICINE

## 2025-05-09 PROCEDURE — 80053 COMPREHEN METABOLIC PANEL: CPT | Performed by: INTERNAL MEDICINE

## 2025-05-09 RX ORDER — ANASTROZOLE 1 MG/1
1 TABLET ORAL DAILY
Qty: 30 TABLET | Refills: 11 | Status: SHIPPED | OUTPATIENT
Start: 2025-05-09 | End: 2026-05-04

## 2025-06-18 ENCOUNTER — OFFICE VISIT (OUTPATIENT)
Dept: ENDOCRINOLOGY | Age: 70
End: 2025-06-18
Payer: MEDICARE

## 2025-06-18 VITALS
TEMPERATURE: 98.6 F | DIASTOLIC BLOOD PRESSURE: 68 MMHG | HEIGHT: 69 IN | OXYGEN SATURATION: 93 % | WEIGHT: 137 LBS | BODY MASS INDEX: 20.29 KG/M2 | SYSTOLIC BLOOD PRESSURE: 120 MMHG | HEART RATE: 79 BPM

## 2025-06-18 DIAGNOSIS — E10.65 TYPE 1 DIABETES MELLITUS WITH HYPERGLYCEMIA: Primary | ICD-10-CM

## 2025-06-18 DIAGNOSIS — Z96.41 INSULIN PUMP STATUS: ICD-10-CM

## 2025-06-18 DIAGNOSIS — Z86.79 HISTORY OF CAD (CORONARY ARTERY DISEASE): ICD-10-CM

## 2025-06-18 DIAGNOSIS — E10.42 DIABETIC PERIPHERAL NEUROPATHY ASSOCIATED WITH TYPE 1 DIABETES MELLITUS: ICD-10-CM

## 2025-06-18 DIAGNOSIS — E78.2 MIXED HYPERLIPIDEMIA: ICD-10-CM

## 2025-06-18 LAB
BUN SERPL-MCNC: 6 MG/DL (ref 8–23)
BUN/CREAT SERPL: 8.5 (ref 7–25)
CALCIUM SERPL-MCNC: 8.7 MG/DL (ref 8.6–10.5)
CHLORIDE SERPL-SCNC: 98 MMOL/L (ref 98–107)
CHOLEST SERPL-MCNC: 128 MG/DL (ref 0–200)
CO2 SERPL-SCNC: 28.9 MMOL/L (ref 22–29)
CREAT SERPL-MCNC: 0.71 MG/DL (ref 0.57–1)
EGFRCR SERPLBLD CKD-EPI 2021: 92.7 ML/MIN/1.73
GLUCOSE SERPL-MCNC: 198 MG/DL (ref 65–99)
HBA1C MFR BLD: 7.4 % (ref 4.8–5.6)
HDLC SERPL-MCNC: 40 MG/DL (ref 40–60)
IMP & REVIEW OF LAB RESULTS: NORMAL
LDLC SERPL CALC-MCNC: 77 MG/DL (ref 0–100)
POTASSIUM SERPL-SCNC: 4.6 MMOL/L (ref 3.5–5.2)
SODIUM SERPL-SCNC: 136 MMOL/L (ref 136–145)
TRIGL SERPL-MCNC: 48 MG/DL (ref 0–150)
UNABLE TO VOID: NORMAL
VLDLC SERPL CALC-MCNC: 11 MG/DL (ref 5–40)

## 2025-06-18 RX ORDER — INSULIN LISPRO 100 [IU]/ML
INJECTION, SOLUTION INTRAVENOUS; SUBCUTANEOUS
Qty: 70 ML | Refills: 1 | Status: SHIPPED | OUTPATIENT
Start: 2025-06-18

## 2025-06-18 NOTE — PROGRESS NOTES
"Chief Complaint  Diabetes    Subjective        Opal Gutierrez presents to [facility] ENDOCRINOLOGY  History of Present Illness    Type 1 diabetes, diagnosed in her 20s and started insulin shortly after, has been on insulin pump since 2014  DM regimen: [employer] 780g with smart guard technology and humalog vials  Back up plan: insulin syringes   Glucagon: yes  Known DM complications: neuropathy,CAD; angioplasty with stent to the LAD October 2020; Ischemic Cardiomyopathy  CV: atorvastatin 20 mg    diabetic eye exam: within the last 6m      Objective   Vital Signs:  /68   Pulse 79   Temp 98.6 °F (37 °C) (Oral)   Ht 175 cm (68.9\")   Wt 62.1 kg (137 lb)   SpO2 93%   BMI 20.29 kg/m²   Estimated body mass index is 20.29 kg/m² as calculated from the following:    Height as of this encounter: 175 cm (68.9\").    Weight as of this encounter: 62.1 kg (137 lb).    BMI is within normal parameters. No other follow-up for BMI required.      Physical Exam  Vitals reviewed.   Constitutional:       General: She is not in acute distress.  HENT:      Head: Normocephalic and atraumatic.   Cardiovascular:      Rate and Rhythm: Normal rate.   Pulmonary:      Effort: Pulmonary effort is normal. No respiratory distress.   Musculoskeletal:         General: No signs of injury. Normal range of motion.      Cervical back: Normal range of motion and neck supple.   Skin:     General: Skin is warm and dry.   Neurological:      Mental Status: She is alert and oriented to person, place, and time. Mental status is at baseline.   Psychiatric:         Mood and Affect: Mood normal.         Behavior: Behavior normal.         Thought Content: Thought content normal.         Judgment: Judgment normal.        Result Review :  The following data was reviewed by: KATELIN Malik on 06/18/2025:  Common labs          3/3/2025    10:07 3/11/2025    11:31 5/9/2025    07:51   Common Labs   Glucose 139   275    BUN 7   8  "   Creatinine 0.68   0.68    Sodium 136   133    Potassium 3.6   4.8    Chloride 99   94    Calcium 8.7   8.9    Albumin 3.1     2.7   3.3    Total Bilirubin 0.3   0.4    Alkaline Phosphatase 94   98    AST (SGOT) 25   19    ALT (SGPT) 11   13    WBC 7.06   7.30    Hemoglobin 12.4   12.0    Hematocrit 40.1   38.7    Platelets 368   389    Hemoglobin A1C  7.10                 Assessment and Plan   Diagnoses and all orders for this visit:    1. Type 1 diabetes mellitus with hyperglycemia (Primary)  -     Insulin Lispro (humaLOG) 100 UNIT/ML injection; USE 70 UNITS DAILY VIA INSULIN PUMP  Dispense: 70 mL; Refill: 1  -     Lipid Panel  -     Hemoglobin A1c  -     Basic Metabolic Panel  -     Microalbumin / Creatinine Urine Ratio - Urine, Clean Catch    2. Diabetic peripheral neuropathy associated with type 1 diabetes mellitus    3. History of CAD (coronary artery disease)    4. Mixed hyperlipidemia    5. Insulin pump status             Follow Up   Return in about 3 months (around 9/18/2025).    Resume routine meal time insulin doses   Labs today   Continue statin  Additional recommendations to follow as needed     Patient was given instructions and counseling regarding her condition or for health maintenance advice. Please see specific information pulled into the AVS if appropriate.     KATELIN Malik

## 2025-06-23 ENCOUNTER — TREATMENT (OUTPATIENT)
Dept: ENDOCRINOLOGY | Age: 70
End: 2025-06-23
Payer: MEDICARE

## 2025-06-23 DIAGNOSIS — E10.65 TYPE 1 DIABETES MELLITUS WITH HYPERGLYCEMIA: Primary | ICD-10-CM

## 2025-06-23 PROCEDURE — 95251 CONT GLUC MNTR ANALYSIS I&R: CPT | Performed by: NURSE PRACTITIONER

## 2025-06-23 NOTE — PROGRESS NOTES
Cgm review 6/5/25-6/18/25  High 31%  Very high 5%  63% time in range  1% low  Smart guard 88%  Gmi 7.3%  TDD 40u  Bolus 60%    Compared to:  Cgm review 2/26/25-3/11/25  High 18%  Very high 3%  77% time in range  2% low  Smart guard 94%  Gmi 6.7%  TDD 48u  Bolus 75%    Time in range not quite at goal  Continue with plans to improve meal time insulin dosing to reduce PP hyperglycemia